# Patient Record
Sex: MALE | Race: WHITE | Employment: FULL TIME | URBAN - METROPOLITAN AREA
[De-identification: names, ages, dates, MRNs, and addresses within clinical notes are randomized per-mention and may not be internally consistent; named-entity substitution may affect disease eponyms.]

---

## 2021-06-24 ENCOUNTER — HOSPITAL ENCOUNTER (OUTPATIENT)
Dept: GENERAL RADIOLOGY | Age: 51
Discharge: HOME | End: 2021-06-24
Payer: COMMERCIAL

## 2021-06-24 DIAGNOSIS — R10.13 EPIGASTRIC PAIN: ICD-10-CM

## 2021-06-24 PROCEDURE — 74240 X-RAY XM UPR GI TRC 1CNTRST: CPT

## 2021-11-01 PROBLEM — K40.90 INGUINAL HERNIA OF LEFT SIDE WITHOUT OBSTRUCTION OR GANGRENE: Status: ACTIVE | Noted: 2021-11-01

## 2021-11-15 ENCOUNTER — APPOINTMENT (OUTPATIENT)
Dept: CT IMAGING | Age: 51
End: 2021-11-15
Payer: COMMERCIAL

## 2021-11-15 ENCOUNTER — HOSPITAL ENCOUNTER (EMERGENCY)
Age: 51
Discharge: HOME | End: 2021-11-16
Attending: EMERGENCY MEDICINE
Payer: COMMERCIAL

## 2021-11-15 DIAGNOSIS — K40.20 NON-RECURRENT BILATERAL INGUINAL HERNIA WITHOUT OBSTRUCTION OR GANGRENE: Primary | ICD-10-CM

## 2021-11-15 DIAGNOSIS — K40.90 INGUINAL HERNIA OF LEFT SIDE WITHOUT OBSTRUCTION OR GANGRENE: ICD-10-CM

## 2021-11-15 LAB
ANION GAP SERPL CALCULATED.3IONS-SCNC: 16 MMOL/L (ref 4–16)
BACTERIA: ABNORMAL /HPF
BASOPHILS ABSOLUTE: 0.1 K/CU MM
BASOPHILS RELATIVE PERCENT: 1.1 % (ref 0–1)
BILIRUBIN URINE: NEGATIVE MG/DL
BLOOD, URINE: NEGATIVE
BUN BLDV-MCNC: 12 MG/DL (ref 6–23)
CALCIUM SERPL-MCNC: 8.8 MG/DL (ref 8.3–10.6)
CAST TYPE: ABNORMAL /HPF
CHLORIDE BLD-SCNC: 91 MMOL/L (ref 99–110)
CLARITY: CLEAR
CO2: 20 MMOL/L (ref 21–32)
COLOR: ABNORMAL
COMMENT UA: ABNORMAL
CREAT SERPL-MCNC: 0.7 MG/DL (ref 0.9–1.3)
CRYSTAL TYPE: ABNORMAL /HPF
DIFFERENTIAL TYPE: ABNORMAL
EOSINOPHILS ABSOLUTE: 0 K/CU MM
EOSINOPHILS RELATIVE PERCENT: 0.5 % (ref 0–3)
EPITHELIAL CELLS, UA: 2 /HPF
GFR AFRICAN AMERICAN: >60 ML/MIN/1.73M2
GFR NON-AFRICAN AMERICAN: >60 ML/MIN/1.73M2
GLUCOSE BLD-MCNC: 121 MG/DL (ref 70–99)
GLUCOSE, URINE: NEGATIVE MG/DL
HCT VFR BLD CALC: 44.2 % (ref 42–52)
HEMOGLOBIN: 15.9 GM/DL (ref 13.5–18)
IMMATURE NEUTROPHIL %: 0.5 % (ref 0–0.43)
KETONES, URINE: NEGATIVE MG/DL
LEUKOCYTE ESTERASE, URINE: NEGATIVE
LYMPHOCYTES ABSOLUTE: 1.5 K/CU MM
LYMPHOCYTES RELATIVE PERCENT: 27.2 % (ref 24–44)
MCH RBC QN AUTO: 32.4 PG (ref 27–31)
MCHC RBC AUTO-ENTMCNC: 36 % (ref 32–36)
MCV RBC AUTO: 90.2 FL (ref 78–100)
MONOCYTES ABSOLUTE: 0.7 K/CU MM
MONOCYTES RELATIVE PERCENT: 12.7 % (ref 0–4)
NITRITE URINE, QUANTITATIVE: NEGATIVE
PDW BLD-RTO: 15.9 % (ref 11.7–14.9)
PH, URINE: 6 (ref 5–8)
PLATELET # BLD: 127 K/CU MM (ref 140–440)
PMV BLD AUTO: 13.1 FL (ref 7.5–11.1)
POTASSIUM SERPL-SCNC: 4 MMOL/L (ref 3.5–5.1)
PROTEIN UA: NEGATIVE MG/DL
RBC # BLD: 4.9 M/CU MM (ref 4.6–6.2)
RBC URINE: ABNORMAL /HPF (ref 0–3)
SEGMENTED NEUTROPHILS ABSOLUTE COUNT: 3.3 K/CU MM
SEGMENTED NEUTROPHILS RELATIVE PERCENT: 58 % (ref 36–66)
SODIUM BLD-SCNC: 127 MMOL/L (ref 135–145)
SPECIFIC GRAVITY UA: 1.01 (ref 1–1.03)
TOTAL IMMATURE NEUTOROPHIL: 0.03 K/CU MM
UROBILINOGEN, URINE: 0.2 MG/DL (ref 0.2–1)
WBC # BLD: 5.7 K/CU MM (ref 4–10.5)
WBC UA: ABNORMAL /HPF (ref 0–2)

## 2021-11-15 PROCEDURE — 80048 BASIC METABOLIC PNL TOTAL CA: CPT

## 2021-11-15 PROCEDURE — 74177 CT ABD & PELVIS W/CONTRAST: CPT

## 2021-11-15 PROCEDURE — 6360000002 HC RX W HCPCS: Performed by: EMERGENCY MEDICINE

## 2021-11-15 PROCEDURE — 81001 URINALYSIS AUTO W/SCOPE: CPT

## 2021-11-15 PROCEDURE — 96374 THER/PROPH/DIAG INJ IV PUSH: CPT

## 2021-11-15 PROCEDURE — 99285 EMERGENCY DEPT VISIT HI MDM: CPT

## 2021-11-15 PROCEDURE — 85025 COMPLETE CBC W/AUTO DIFF WBC: CPT

## 2021-11-15 PROCEDURE — 96375 TX/PRO/DX INJ NEW DRUG ADDON: CPT

## 2021-11-15 PROCEDURE — 6360000004 HC RX CONTRAST MEDICATION: Performed by: EMERGENCY MEDICINE

## 2021-11-15 RX ORDER — PROMETHAZINE HYDROCHLORIDE 25 MG/1
TABLET ORAL
COMMUNITY
Start: 2021-11-08

## 2021-11-15 RX ORDER — ONDANSETRON 2 MG/ML
4 INJECTION INTRAMUSCULAR; INTRAVENOUS ONCE
Status: COMPLETED | OUTPATIENT
Start: 2021-11-15 | End: 2021-11-15

## 2021-11-15 RX ORDER — MORPHINE SULFATE 4 MG/ML
4 INJECTION, SOLUTION INTRAMUSCULAR; INTRAVENOUS ONCE
Status: COMPLETED | OUTPATIENT
Start: 2021-11-15 | End: 2021-11-15

## 2021-11-15 RX ORDER — ONDANSETRON 4 MG/1
TABLET, FILM COATED ORAL
COMMUNITY
Start: 2021-11-08 | End: 2021-11-16

## 2021-11-15 RX ADMIN — MORPHINE SULFATE 4 MG: 4 INJECTION INTRAVENOUS at 23:04

## 2021-11-15 RX ADMIN — ONDANSETRON 4 MG: 2 INJECTION INTRAMUSCULAR; INTRAVENOUS at 23:03

## 2021-11-15 RX ADMIN — IOPAMIDOL 75 ML: 755 INJECTION, SOLUTION INTRAVENOUS at 23:52

## 2021-11-15 ASSESSMENT — PAIN DESCRIPTION - ORIENTATION: ORIENTATION: LEFT

## 2021-11-15 ASSESSMENT — PAIN DESCRIPTION - ONSET: ONSET: ON-GOING

## 2021-11-15 ASSESSMENT — PAIN DESCRIPTION - LOCATION: LOCATION: GROIN

## 2021-11-15 ASSESSMENT — PAIN SCALES - GENERAL
PAINLEVEL_OUTOF10: 5
PAINLEVEL_OUTOF10: 5

## 2021-11-15 ASSESSMENT — PAIN DESCRIPTION - DESCRIPTORS: DESCRIPTORS: CRAMPING;CONSTANT

## 2021-11-15 ASSESSMENT — PAIN DESCRIPTION - FREQUENCY: FREQUENCY: CONTINUOUS

## 2021-11-15 ASSESSMENT — PAIN DESCRIPTION - PROGRESSION: CLINICAL_PROGRESSION: NOT CHANGED

## 2021-11-15 ASSESSMENT — PAIN DESCRIPTION - PAIN TYPE: TYPE: ACUTE PAIN

## 2021-11-15 ASSESSMENT — PAIN - FUNCTIONAL ASSESSMENT: PAIN_FUNCTIONAL_ASSESSMENT: PREVENTS OR INTERFERES SOME ACTIVE ACTIVITIES AND ADLS

## 2021-11-15 NOTE — Clinical Note
Jessica Diaz was seen and treated in our emergency department on 11/15/2021. He may return to work on 11/19/2021. If you have any questions or concerns, please don't hesitate to call.       Tracey Frazier MD

## 2021-11-16 VITALS
SYSTOLIC BLOOD PRESSURE: 110 MMHG | RESPIRATION RATE: 18 BRPM | OXYGEN SATURATION: 96 % | TEMPERATURE: 98.4 F | WEIGHT: 225 LBS | DIASTOLIC BLOOD PRESSURE: 76 MMHG | HEIGHT: 72 IN | BODY MASS INDEX: 30.48 KG/M2 | HEART RATE: 79 BPM

## 2021-11-16 RX ORDER — ONDANSETRON 4 MG/1
4 TABLET, FILM COATED ORAL EVERY 8 HOURS PRN
Qty: 8 TABLET | Refills: 0 | Status: SHIPPED | OUTPATIENT
Start: 2021-11-16

## 2021-11-16 RX ORDER — HYDROCODONE BITARTRATE AND ACETAMINOPHEN 5; 325 MG/1; MG/1
1 TABLET ORAL EVERY 6 HOURS PRN
Qty: 10 TABLET | Refills: 0 | Status: SHIPPED | OUTPATIENT
Start: 2021-11-16 | End: 2021-11-19

## 2021-11-16 NOTE — ED NOTES
Patient prepared for and ready to be discharged. Patient discharged at this time in no acute distress after verbalizing understanding of discharge instructions. Patient left after receiving After Visit Summary instructions.       Lucien Forrest RN  11/16/21 0022

## 2021-11-16 NOTE — ED PROVIDER NOTES
EMERGENCY DEPARTMENT ENCOUNTER    Patient: Omaira Doss  MRN: [id number]  : 1970  Date of Evaluation: 2021  ED Provider:  Randa Fleischer, MD    CHIEF COMPLAINT  Chief Complaint   Patient presents with    Inguinal Hernia     Patient states right side able to reduce, Pateitn to have hernia surgery on thursday     Nausea       HPI  Omaira Doss is a 54 y.o. male who presents with complaints of moderate to severe, constant sharp left lower quadrant abdominal pain which had begun over the last several weeks intermittently but has become more constant over the last week. Worsened with movement and palpation. Radiates to the left groin. Denies any testicular pain. Has had associated nausea without emesis. Patient reports that he had previously felt a slight bulge in the left groin and saw his primary care provider and was referred to general surgeon for presumed left inguinal hernia. He states he is scheduled for surgery for this in 3 days. He does report that the hernia easily reduces however. Denies any other associated symptoms or complaints or concerns.     REVIEW OF SYSTEMS    I have reviewed the nursing notes    Constitutional: negative for fever, chills, weight change, change in appetite  Neurological: negative for HA, lightheadedness, numbness, weakness  Ophthalmic: negative for vision change  ENT: negative for congestion, runny nose, sore throat  Cardiovascular: negative for chest pain, palpitations  Respiratory: negative for SOB, cough  GI: negative for vomiting, diarrhea, constipation, hematemesis, hematochezia, melena   : negative for dysuria, hematuria, testicular pain or swelling  Musculoskeletal: negative for myalgias, decreased ROM, joint swelling  Dermatological: negative for rash, pruritis  Endocrine: negative for temperature intolerance, diaphoresis  Hematological: negative for anemia, bleeding      PAST MEDICAL HISTORY  Past Medical History:   Diagnosis Date    Anxiety  Hernia, inguinal, left        CURRENT MEDICATIONS  [unfilled]    ALLERGIES  No Known Allergies    SURGICAL HISTORY  Past Surgical History:   Procedure Laterality Date    CYST REMOVAL Left     LEFT ARM       FAMILY HISTORY  No family history on file. SOCIAL HISTORY  Social History     Socioeconomic History    Marital status:      Spouse name: Not on file    Number of children: Not on file    Years of education: Not on file    Highest education level: Not on file   Occupational History    Not on file   Tobacco Use    Smoking status: Former Smoker     Quit date: 2006     Years since quitting: 15.8    Smokeless tobacco: Never Used   Substance and Sexual Activity    Alcohol use: Yes     Comment: few beers a week     Drug use: Never    Sexual activity: Yes     Partners: Female     Comment:    Other Topics Concern    Not on file   Social History Narrative    Not on file     Social Determinants of Health     Financial Resource Strain:     Difficulty of Paying Living Expenses: Not on file   Food Insecurity:     Worried About [address] in the Last Year: Not on file    [address] in the Last Year: Not on file   Transportation Needs:     Lack of Transportation (Medical): Not on file    Lack of Transportation (Non-Medical):  Not on file   Physical Activity:     Days of Exercise per Week: Not on file    Minutes of Exercise per Session: Not on file   Stress:     Feeling of Stress : Not on file   Social Connections:     Frequency of Communication with Friends and Family: Not on file    Frequency of Social Gatherings with Friends and Family: Not on file    Attends Z[employer]ast[employer] Services: Not on file    Active Member of Clubs or Organizations: Not on file    Attends Club or Organization Meetings: Not on file    Marital Status: Not on file   Intimate Partner Violence:     Fear of Current or Ex-Partner: Not on file    Emotionally Abused: Not on file    Physically Abused: Not on file    Sexually Abused: Not on file   Housing Stability:     Unable to Pay for Housing in the Last Year: Not on file    Number of Places Lived in the Last Year: Not on file    Unstable Housing in the Last Year: Not on file         **Past medical, family and social histories reviewed and verified by me**      PHYSICAL EXAM  VITAL SIGNS:   ED Triage Vitals [11/15/21 2116]   Enc Vitals Group      /76      Pulse 101      Resp 22      Temp 98.6 °F (37 °C)      Temp Source Oral      SpO2 96 %      Weight 225 lb (102.1 kg)      Height 6' (1.829 m)      Head Circumference       Peak Flow       Pain Score       Pain Loc       Pain Edu? Excl. in [address]? Vitals during ED course were reviewed and are as charted. Constitutional: Minimal distress, Non-toxic appearance    Eyes:  Conjunctiva normal, No discharge    HENT: Normocephalic, Atraumatic, Bilateral external ears normal, posterior oropharynx is nonerythematous and without exudate, uvula is midline, oropharynx moist    Neck: Supple, no stridor, no grossly visible or palpable masses    Cardiovascular: Regular rate and rhythm, No murmurs, No rubs, No gallops    Pulmonary/Chest:  Normal breath sounds, No respiratory distress or accessory muscle use, No wheezing, crackles or rhonchi. Abdomen: Left lower quadrant abdominal tenderness to palpation without peritoneal signs, otherwise abdomen is soft, nondistended and nonrigid, No tenderness or peritoneal signs elsewhere, No masses, normal bowel sounds    Genitalia: External genitalia appear normal, No masses or lesions. No discharge. No scrotal swelling or discoloration. No testicular pain. Normal lie of the testicles bilaterally. Normal cremasteric reflexes. Back:  No midline point tenderness, No paraspinous muscle tenderness.   No CVA tenderness    Extremities:  No gross deformities, no edema, no tenderness    Neurologic:  Normal motor function, Normal sensory function, No focal deficits    Skin:  Warm, Dry, No erythema, No rash, No cyanosis, No mottling    Lymphatic:  No inguinal lymphadenopathy          RADIOLOGY/PROCEDURES/LABS/MEDICATIONS ADMINISTERED:    I have reviewed and interpreted all of the currently available lab results from this visit (if applicable):  Results for orders placed or performed during the hospital encounter of 11/15/21   CBC w/ auto diff   Result Value Ref Range    WBC 5.7 4.0 - 10.5 K/CU MM    RBC 4.90 4.6 - 6.2 M/CU MM    Hemoglobin 15.9 13.5 - 18.0 GM/DL    Hematocrit 44.2 42 - 52 %    MCV 90.2 78 - 100 FL    MCH 32.4 (H) 27 - 31 PG    MCHC 36.0 32.0 - 36.0 %    RDW 15.9 (H) 11.7 - 14.9 %    Platelets 025 (L) 400 - 440 K/CU MM    MPV 13.1 (H) 7.5 - 11.1 FL    Differential Type AUTOMATED DIFFERENTIAL     Segs Relative 58.0 36 - 66 %    Lymphocytes % 27.2 24 - 44 %    Monocytes % 12.7 (H) 0 - 4 %    Eosinophils % 0.5 0 - 3 %    Basophils % 1.1 (H) 0 - 1 %    Segs Absolute 3.3 K/CU MM    Lymphocytes Absolute 1.5 K/CU MM    Monocytes Absolute 0.7 K/CU MM    Eosinophils Absolute 0.0 K/CU MM    Basophils Absolute 0.1 K/CU MM    Immature Neutrophil % 0.5 (H) 0 - 0.43 %    Total Immature Neutrophil 0.03 K/CU MM   Basic Metabolic Panel w/ Reflex to MG   Result Value Ref Range    Sodium 127 (L) 135 - 145 MMOL/L    Potassium 4.0 3.5 - 5.1 MMOL/L    Chloride 91 (L) 99 - 110 mMol/L    CO2 20 (L) 21 - 32 MMOL/L    Anion Gap 16 4 - 16    BUN 12 6 - 23 MG/DL    CREATININE 0.7 (L) 0.9 - 1.3 MG/DL    Glucose 121 (H) 70 - 99 MG/DL    Calcium 8.8 8.3 - 10.6 MG/DL    GFR Non-African American >60 >60 mL/min/1.73m2    GFR African American >60 >60 mL/min/1.73m2   Urinalysis with microscopic   Result Value Ref Range    Color, UA DARK YELLOW (A) YELLOW    Clarity, UA CLEAR CLEAR    Glucose, Urine NEGATIVE NEGATIVE MG/DL    Bilirubin Urine NEGATIVE NEGATIVE MG/DL    Ketones, Urine NEGATIVE NEGATIVE MG/DL    Specific Gravity, UA 1.015 1.001 - 1.035    Blood, Urine NEGATIVE NEGATIVE    pH, Urine 6.0 5.0 - 8.0    Protein, UA NEGATIVE NEGATIVE MG/DL    Urobilinogen, Urine 0.2 0.2 - 1.0 MG/DL    Nitrite Urine, Quantitative NEGATIVE NEGATIVE    Leukocyte Esterase, Urine NEGATIVE NEGATIVE    RBC, UA NO CELLS SEEN 0 - 3 /HPF    WBC, UA NO CELLS SEEN 0 - 2 /HPF    Epithelial Cells, UA 2 /HPF    Cast Type NO CAST FORMS SEEN NO CAST FORMS SEEN /HPF    Bacteria, UA RARE (A) NEGATIVE /HPF    Crystal Type RARE (A) NEGATIVE /HPF    Urinalysis Comments RARE           ABNORMAL LABS:  Labs Reviewed   CBC WITH AUTO DIFFERENTIAL - Abnormal; Notable for the following components:       Result Value    MCH 32.4 (*)     RDW 15.9 (*)     Platelets 175 (*)     MPV 13.1 (*)     Monocytes % 12.7 (*)     Basophils % 1.1 (*)     Immature Neutrophil % 0.5 (*)     All other components within normal limits   BASIC METABOLIC PANEL W/ REFLEX TO MG FOR LOW K - Abnormal; Notable for the following components:    Sodium 127 (*)     Chloride 91 (*)     CO2 20 (*)     CREATININE 0.7 (*)     Glucose 121 (*)     All other components within normal limits   URINALYSIS WITH MICROSCOPIC - Abnormal; Notable for the following components:    Color, UA DARK YELLOW (*)     Bacteria, UA RARE (*)     Crystal Type RARE (*)     All other components within normal limits         IMAGING STUDIES ORDERED:  CT ABDOMEN PELVIS W IV CONTRAST    I have personally viewed the imaging studies. The radiologist interpretation is:   CT ABDOMEN PELVIS W IV CONTRAST Additional Contrast? None   Final Result   Bilateral fat containing inguinal hernias, larger on the left. Nonobstructing renal stones bilaterally. Diffuse hepatic steatosis.                MEDICATIONS ADMINISTERED:  Medications   morphine sulfate (PF) injection 4 mg (4 mg IntraVENous Given 11/15/21 2304)   ondansetron (ZOFRAN) injection 4 mg (4 mg IntraVENous Given 11/15/21 2303)   iopamidol (ISOVUE-370) 76 % injection 75 mL (75 mLs IntraVENous Given 11/15/21 8154)         COURSE & MEDICAL DECISION MAKING  Last vitals: /74   Pulse 97   Temp 98.4 °F (36.9 °C)   Resp 19   Ht 6' (1.829 m)   Wt 225 lb (102.1 kg)   SpO2 99%   BMI 30.52 kg/m²     Patient presented with painful fat-containing left inguinal hernia. Differential diagnoses considered included, but were not limited to, acute appendicitis, acute intra-abdominal catastrophe, acute vascular emergency, bowel obstruction, perforated bowel, incarcerated or strangulated hernia, colitis, diverticulitis, ischemic bowel, cystitis/UTI/pyelonephritis, kidney stone and acute testicular torsion/pathology. Patient was given the above medications with improvement in symptoms. On repeat examination the abdomen was non-surgical without peritoneal signs. The patient was able to tolerate oral intake. Patient was advised of the changing nature of intra-abdominal pathology and specific signs and symptoms to watch which may signal serious infectious, metabolic or surgical conditions for which immediate return to the ED would be necessary for further diagnosis and treatment. Additional workup and treatment in the ED as documented above. Patient reassured and will be discharged home. I have explained to the patient in appropriate terminology our work-up in the ED and their diagnosis. I have also given anticipatory guidance and expectant management of their condition as an outpatient as per my custom. The patient was given clear discharge and follow-up instructions including return to the ER immediately for worsening concerns. The patient has been advised to follow-up with their primary care physician and/or referred physician in the next two to three days or sooner if worsening and to return to the ER immediately as above with any concerns. I provided the patient counseling with regard to my customary list of strict return precautions as well as return precautions specific to the cause for today's emergency department visit.  The patient will return

## 2021-11-16 NOTE — ED TRIAGE NOTES
Patient comes to ED with complaint of hernia on left side groin that he states he is able to reduce his self, patient also states that he has been very fatigued and has nausea and has been dry heaving. Patient states that he is suppose to have hernia repair surgery this Thursday.

## 2021-12-01 PROBLEM — Z98.890 S/P LEFT INGUINAL HERNIA REPAIR: Status: ACTIVE | Noted: 2021-12-01

## 2021-12-01 PROBLEM — I83.892 VARICOSE VEINS OF LEFT LOWER EXTREMITY WITH OTHER COMPLICATIONS: Status: ACTIVE | Noted: 2021-12-01

## 2021-12-01 PROBLEM — Z87.19 S/P LEFT INGUINAL HERNIA REPAIR: Status: ACTIVE | Noted: 2021-12-01

## 2022-01-04 PROBLEM — I83.893 VARICOSE VEINS OF LOWER EXTREMITIES WITH COMPLICATIONS, BILATERAL: Status: ACTIVE | Noted: 2022-01-04

## 2023-05-09 LAB
ALBUMIN SERPL-MCNC: 3.9 G/DL
ALP BLD-CCNC: 114 U/L
ALT SERPL-CCNC: 38 U/L
ANION GAP SERPL CALCULATED.3IONS-SCNC: 1.2 MMOL/L
AST SERPL-CCNC: 46 U/L
BASOPHILS ABSOLUTE: 0.1 /ΜL
BASOPHILS RELATIVE PERCENT: 1.6 %
BILIRUB SERPL-MCNC: 0.7 MG/DL (ref 0.1–1.4)
BUN BLDV-MCNC: 16 MG/DL
CALCIUM SERPL-MCNC: 9.8 MG/DL
CHLORIDE BLD-SCNC: 101 MMOL/L
CO2: 23 MMOL/L
CREAT SERPL-MCNC: 0.9 MG/DL
EGFR: NORMAL
EOSINOPHILS ABSOLUTE: 0.2 /ΜL
EOSINOPHILS RELATIVE PERCENT: 3.3 %
GLUCOSE BLD-MCNC: 102 MG/DL
HCT VFR BLD CALC: 41.1 % (ref 41–53)
HEMOGLOBIN: 14.1 G/DL (ref 13.5–17.5)
LYMPHOCYTES ABSOLUTE: 1.8 /ΜL
LYMPHOCYTES RELATIVE PERCENT: 37.3 %
MCH RBC QN AUTO: 29.3 PG
MCHC RBC AUTO-ENTMCNC: 34.3 G/DL
MCV RBC AUTO: 85.3 FL
MONOCYTES ABSOLUTE: 0.8 /ΜL
MONOCYTES RELATIVE PERCENT: 15.4 %
NEUTROPHILS ABSOLUTE: 2.1 /ΜL
NEUTROPHILS RELATIVE PERCENT: 42.2 %
PDW BLD-RTO: 16.3 %
PLATELET # BLD: 130 K/ΜL
PMV BLD AUTO: 10.7 FL
POTASSIUM SERPL-SCNC: 4 MMOL/L
RBC # BLD: 4.82 10^6/ΜL
SODIUM BLD-SCNC: 133 MMOL/L
TOTAL PROTEIN: 7.2
WBC # BLD: 4.9 10^3/ML

## 2023-12-12 ENCOUNTER — OFFICE VISIT (OUTPATIENT)
Dept: FAMILY MEDICINE CLINIC | Age: 53
End: 2023-12-12

## 2023-12-12 VITALS
WEIGHT: 259 LBS | BODY MASS INDEX: 35.08 KG/M2 | HEART RATE: 61 BPM | SYSTOLIC BLOOD PRESSURE: 114 MMHG | DIASTOLIC BLOOD PRESSURE: 74 MMHG | HEIGHT: 72 IN | OXYGEN SATURATION: 96 %

## 2023-12-12 DIAGNOSIS — M25.551 HIP PAIN, RIGHT: ICD-10-CM

## 2023-12-12 DIAGNOSIS — M54.50 CHRONIC BILATERAL LOW BACK PAIN WITHOUT SCIATICA: ICD-10-CM

## 2023-12-12 DIAGNOSIS — K76.6 PORTAL HYPERTENSION (HCC): Primary | ICD-10-CM

## 2023-12-12 DIAGNOSIS — G89.29 CHRONIC BILATERAL LOW BACK PAIN WITHOUT SCIATICA: ICD-10-CM

## 2023-12-12 DIAGNOSIS — I85.00 IDIOPATHIC ESOPHAGEAL VARICES WITHOUT BLEEDING (HCC): ICD-10-CM

## 2023-12-12 DIAGNOSIS — F34.1 DYSTHYMIA: ICD-10-CM

## 2023-12-12 PROCEDURE — 99213 OFFICE O/P EST LOW 20 MIN: CPT | Performed by: INTERNAL MEDICINE

## 2023-12-12 RX ORDER — NADOLOL 20 MG/1
20 TABLET ORAL DAILY
COMMUNITY
Start: 2023-12-06 | End: 2023-12-12 | Stop reason: SDUPTHER

## 2023-12-12 RX ORDER — ACAMPROSATE CALCIUM 333 MG/1
TABLET, DELAYED RELEASE ORAL
COMMUNITY
Start: 2023-12-06 | End: 2023-12-12 | Stop reason: SDUPTHER

## 2023-12-12 RX ORDER — NADOLOL 20 MG/1
20 TABLET ORAL DAILY
Qty: 90 TABLET | Refills: 1 | Status: SHIPPED | OUTPATIENT
Start: 2023-12-12

## 2023-12-12 RX ORDER — PAROXETINE HYDROCHLORIDE 20 MG/1
30 TABLET, FILM COATED ORAL EVERY MORNING
Qty: 90 TABLET | Refills: 1 | Status: SHIPPED | OUTPATIENT
Start: 2023-12-12

## 2023-12-12 RX ORDER — ACAMPROSATE CALCIUM 333 MG/1
TABLET, DELAYED RELEASE ORAL
Qty: 270 TABLET | Refills: 1 | Status: SHIPPED | OUTPATIENT
Start: 2023-12-12

## 2023-12-12 ASSESSMENT — PATIENT HEALTH QUESTIONNAIRE - PHQ9
SUM OF ALL RESPONSES TO PHQ9 QUESTIONS 1 & 2: 0
SUM OF ALL RESPONSES TO PHQ QUESTIONS 1-9: 0
2. FEELING DOWN, DEPRESSED OR HOPELESS: 0
SUM OF ALL RESPONSES TO PHQ QUESTIONS 1-9: 0
1. LITTLE INTEREST OR PLEASURE IN DOING THINGS: 0

## 2024-03-11 ENCOUNTER — APPOINTMENT (OUTPATIENT)
Dept: NON INVASIVE DIAGNOSTICS | Age: 54
End: 2024-03-11
Attending: INTERNAL MEDICINE

## 2024-03-11 ENCOUNTER — APPOINTMENT (OUTPATIENT)
Dept: GENERAL RADIOLOGY | Age: 54
End: 2024-03-11

## 2024-03-11 ENCOUNTER — APPOINTMENT (OUTPATIENT)
Dept: CT IMAGING | Age: 54
End: 2024-03-11

## 2024-03-11 ENCOUNTER — HOSPITAL ENCOUNTER (INPATIENT)
Age: 54
LOS: 4 days | Discharge: HOME | End: 2024-03-15
Attending: EMERGENCY MEDICINE

## 2024-03-11 DIAGNOSIS — K70.10 ALCOHOLIC HEPATITIS WITHOUT ASCITES: ICD-10-CM

## 2024-03-11 DIAGNOSIS — K70.30 ALCOHOLIC CIRRHOSIS OF LIVER WITHOUT ASCITES (HCC): ICD-10-CM

## 2024-03-11 DIAGNOSIS — R06.02 SHORTNESS OF BREATH: ICD-10-CM

## 2024-03-11 DIAGNOSIS — D69.6 THROMBOCYTOPENIA (HCC): ICD-10-CM

## 2024-03-11 DIAGNOSIS — R07.9 CHEST PAIN, RULE OUT ACUTE MYOCARDIAL INFARCTION: ICD-10-CM

## 2024-03-11 DIAGNOSIS — R07.9 CHEST PAIN, UNSPECIFIED TYPE: Primary | ICD-10-CM

## 2024-03-11 LAB
ALBUMIN SERPL-MCNC: 3.2 GM/DL (ref 3.4–5)
ALP BLD-CCNC: 224 IU/L (ref 40–128)
ALT SERPL-CCNC: 218 U/L (ref 10–40)
ANION GAP SERPL CALCULATED.3IONS-SCNC: 13 MMOL/L (ref 7–16)
AST SERPL-CCNC: 376 IU/L (ref 15–37)
BASOPHILS ABSOLUTE: 0.1 K/CU MM
BASOPHILS RELATIVE PERCENT: 1.1 % (ref 0–1)
BILIRUB SERPL-MCNC: 2.9 MG/DL (ref 0–1)
BUN SERPL-MCNC: 11 MG/DL (ref 6–23)
CALCIUM SERPL-MCNC: 7.8 MG/DL (ref 8.3–10.6)
CHLORIDE BLD-SCNC: 97 MMOL/L (ref 99–110)
CO2: 26 MMOL/L (ref 21–32)
CREAT SERPL-MCNC: 0.6 MG/DL (ref 0.9–1.3)
D DIMER: 0.99 UG/ML (FEU)
DIFFERENTIAL TYPE: ABNORMAL
ECHO AO ROOT DIAM: 3.1 CM
ECHO AO ROOT INDEX: 1.32 CM/M2
ECHO AV AREA PEAK VELOCITY: 3.1 CM2
ECHO AV AREA VTI: 2.8 CM2
ECHO AV AREA/BSA PEAK VELOCITY: 1.3 CM2/M2
ECHO AV AREA/BSA VTI: 1.2 CM2/M2
ECHO AV MEAN GRADIENT: 4 MMHG
ECHO AV MEAN VELOCITY: 0.9 M/S
ECHO AV PEAK GRADIENT: 7 MMHG
ECHO AV PEAK VELOCITY: 1.3 M/S
ECHO AV VELOCITY RATIO: 0.92
ECHO AV VTI: 29.4 CM
ECHO BSA: 2.4 M2
ECHO EST RA PRESSURE: 3 MMHG
ECHO LA AREA 4C: 17.7 CM2
ECHO LA DIAMETER INDEX: 1.32 CM/M2
ECHO LA DIAMETER: 3.1 CM
ECHO LA MAJOR AXIS: 4.8 CM
ECHO LA TO AORTIC ROOT RATIO: 1
ECHO LA VOL MOD A4C: 53 ML (ref 18–58)
ECHO LA VOLUME INDEX MOD A4C: 23 ML/M2 (ref 16–34)
ECHO LV E' LATERAL VELOCITY: 12 CM/S
ECHO LV E' SEPTAL VELOCITY: 9 CM/S
ECHO LV EDV A4C: 134 ML
ECHO LV EDV INDEX A4C: 57 ML/M2
ECHO LV EJECTION FRACTION A4C: 51 %
ECHO LV ESV A4C: 66 ML
ECHO LV ESV INDEX A4C: 28 ML/M2
ECHO LV FRACTIONAL SHORTENING: 30 % (ref 28–44)
ECHO LV INTERNAL DIMENSION DIASTOLE INDEX: 1.88 CM/M2
ECHO LV INTERNAL DIMENSION DIASTOLIC: 4.4 CM (ref 4.2–5.9)
ECHO LV INTERNAL DIMENSION SYSTOLIC INDEX: 1.32 CM/M2
ECHO LV INTERNAL DIMENSION SYSTOLIC: 3.1 CM
ECHO LV IVSD: 1.2 CM (ref 0.6–1)
ECHO LV MASS 2D: 191.3 G (ref 88–224)
ECHO LV MASS INDEX 2D: 81.8 G/M2 (ref 49–115)
ECHO LV POSTERIOR WALL DIASTOLIC: 1.2 CM (ref 0.6–1)
ECHO LV RELATIVE WALL THICKNESS RATIO: 0.55
ECHO LVOT AREA: 3.5 CM2
ECHO LVOT AV VTI INDEX: 0.81
ECHO LVOT DIAM: 2.1 CM
ECHO LVOT MEAN GRADIENT: 2 MMHG
ECHO LVOT PEAK GRADIENT: 5 MMHG
ECHO LVOT PEAK VELOCITY: 1.2 M/S
ECHO LVOT STROKE VOLUME INDEX: 35.4 ML/M2
ECHO LVOT SV: 82.7 ML
ECHO LVOT VTI: 23.9 CM
ECHO MV A VELOCITY: 0.65 M/S
ECHO MV E DECELERATION TIME (DT): 187 MS
ECHO MV E VELOCITY: 0.81 M/S
ECHO MV E/A RATIO: 1.25
ECHO MV E/E' LATERAL: 6.75
ECHO MV E/E' RATIO (AVERAGED): 7.88
ECHO RIGHT VENTRICULAR SYSTOLIC PRESSURE (RVSP): 13 MMHG
ECHO RV MID DIMENSION: 2.7 CM
ECHO TV REGURGITANT MAX VELOCITY: 1.58 M/S
ECHO TV REGURGITANT PEAK GRADIENT: 10 MMHG
EOSINOPHILS ABSOLUTE: 0.1 K/CU MM
EOSINOPHILS RELATIVE PERCENT: 1.4 % (ref 0–3)
GFR SERPL CREATININE-BSD FRML MDRD: >60 ML/MIN/1.73M2
GLUCOSE SERPL-MCNC: 171 MG/DL (ref 70–99)
HCT VFR BLD CALC: 48 % (ref 42–52)
HEMOGLOBIN: 17.2 GM/DL (ref 13.5–18)
IMMATURE NEUTROPHIL %: 0.4 % (ref 0–0.43)
LIPASE: 73 IU/L (ref 13–60)
LYMPHOCYTES ABSOLUTE: 2.3 K/CU MM
LYMPHOCYTES RELATIVE PERCENT: 40.2 % (ref 24–44)
MCH RBC QN AUTO: 29.2 PG (ref 27–31)
MCHC RBC AUTO-ENTMCNC: 35.8 % (ref 32–36)
MCV RBC AUTO: 81.4 FL (ref 78–100)
MONOCYTES ABSOLUTE: 0.7 K/CU MM
MONOCYTES RELATIVE PERCENT: 11.4 % (ref 0–4)
NUCLEATED RBC %: 0 %
PDW BLD-RTO: 16.6 % (ref 11.7–14.9)
PLATELET # BLD: 74 K/CU MM (ref 140–440)
PMV BLD AUTO: 11.3 FL (ref 7.5–11.1)
POTASSIUM SERPL-SCNC: 3.4 MMOL/L (ref 3.5–5.1)
RBC # BLD: 5.9 M/CU MM (ref 4.6–6.2)
REASON FOR REJECTION: NORMAL
REJECTED TEST: NORMAL
SEGMENTED NEUTROPHILS ABSOLUTE COUNT: 2.6 K/CU MM
SEGMENTED NEUTROPHILS RELATIVE PERCENT: 45.5 % (ref 36–66)
SODIUM BLD-SCNC: 136 MMOL/L (ref 135–145)
TOTAL IMMATURE NEUTOROPHIL: 0.02 K/CU MM
TOTAL NUCLEATED RBC: 0 K/CU MM
TOTAL PROTEIN: 6.1 GM/DL (ref 6.4–8.2)
TROPONIN, HIGH SENSITIVITY: 16 NG/L (ref 0–22)
TROPONIN, HIGH SENSITIVITY: 16 NG/L (ref 0–22)
WBC # BLD: 5.7 K/CU MM (ref 4–10.5)

## 2024-03-11 PROCEDURE — 6370000000 HC RX 637 (ALT 250 FOR IP): Performed by: INTERNAL MEDICINE

## 2024-03-11 PROCEDURE — 83690 ASSAY OF LIPASE: CPT

## 2024-03-11 PROCEDURE — 6360000004 HC RX CONTRAST MEDICATION: Performed by: EMERGENCY MEDICINE

## 2024-03-11 PROCEDURE — 80053 COMPREHEN METABOLIC PANEL: CPT

## 2024-03-11 PROCEDURE — 6360000002 HC RX W HCPCS: Performed by: INTERNAL MEDICINE

## 2024-03-11 PROCEDURE — 85025 COMPLETE CBC W/AUTO DIFF WBC: CPT

## 2024-03-11 PROCEDURE — 36415 COLL VENOUS BLD VENIPUNCTURE: CPT

## 2024-03-11 PROCEDURE — 94761 N-INVAS EAR/PLS OXIMETRY MLT: CPT

## 2024-03-11 PROCEDURE — 93306 TTE W/DOPPLER COMPLETE: CPT

## 2024-03-11 PROCEDURE — 71275 CT ANGIOGRAPHY CHEST: CPT

## 2024-03-11 PROCEDURE — 93005 ELECTROCARDIOGRAM TRACING: CPT | Performed by: EMERGENCY MEDICINE

## 2024-03-11 PROCEDURE — 93306 TTE W/DOPPLER COMPLETE: CPT | Performed by: INTERNAL MEDICINE

## 2024-03-11 PROCEDURE — 84484 ASSAY OF TROPONIN QUANT: CPT

## 2024-03-11 PROCEDURE — 87081 CULTURE SCREEN ONLY: CPT

## 2024-03-11 PROCEDURE — 1200000000 HC SEMI PRIVATE

## 2024-03-11 PROCEDURE — 99285 EMERGENCY DEPT VISIT HI MDM: CPT

## 2024-03-11 PROCEDURE — 71045 X-RAY EXAM CHEST 1 VIEW: CPT

## 2024-03-11 PROCEDURE — 2700000000 HC OXYGEN THERAPY PER DAY

## 2024-03-11 PROCEDURE — 85379 FIBRIN DEGRADATION QUANT: CPT

## 2024-03-11 PROCEDURE — 86140 C-REACTIVE PROTEIN: CPT

## 2024-03-11 PROCEDURE — 99254 IP/OBS CNSLTJ NEW/EST MOD 60: CPT | Performed by: INTERNAL MEDICINE

## 2024-03-11 PROCEDURE — 87430 STREP A AG IA: CPT

## 2024-03-11 PROCEDURE — 2580000003 HC RX 258: Performed by: INTERNAL MEDICINE

## 2024-03-11 RX ORDER — ACETAMINOPHEN 650 MG/1
650 SUPPOSITORY RECTAL EVERY 6 HOURS PRN
Status: DISCONTINUED | OUTPATIENT
Start: 2024-03-11 | End: 2024-03-15 | Stop reason: HOSPADM

## 2024-03-11 RX ORDER — ASPIRIN 81 MG/1
81 TABLET, CHEWABLE ORAL DAILY
Status: DISCONTINUED | OUTPATIENT
Start: 2024-03-12 | End: 2024-03-15 | Stop reason: HOSPADM

## 2024-03-11 RX ORDER — LANOLIN ALCOHOL/MO/W.PET/CERES
100 CREAM (GRAM) TOPICAL DAILY
Status: DISCONTINUED | OUTPATIENT
Start: 2024-03-14 | End: 2024-03-15 | Stop reason: HOSPADM

## 2024-03-11 RX ORDER — SODIUM CHLORIDE 0.9 % (FLUSH) 0.9 %
5-40 SYRINGE (ML) INJECTION PRN
Status: DISCONTINUED | OUTPATIENT
Start: 2024-03-11 | End: 2024-03-15 | Stop reason: HOSPADM

## 2024-03-11 RX ORDER — MORPHINE SULFATE 4 MG/ML
4 INJECTION, SOLUTION INTRAMUSCULAR; INTRAVENOUS
Status: DISCONTINUED | OUTPATIENT
Start: 2024-03-11 | End: 2024-03-15 | Stop reason: HOSPADM

## 2024-03-11 RX ORDER — PANTOPRAZOLE SODIUM 40 MG/1
40 TABLET, DELAYED RELEASE ORAL
Status: DISCONTINUED | OUTPATIENT
Start: 2024-03-12 | End: 2024-03-15 | Stop reason: HOSPADM

## 2024-03-11 RX ORDER — MAGNESIUM SULFATE IN WATER 40 MG/ML
2000 INJECTION, SOLUTION INTRAVENOUS PRN
Status: DISCONTINUED | OUTPATIENT
Start: 2024-03-11 | End: 2024-03-15 | Stop reason: HOSPADM

## 2024-03-11 RX ORDER — MORPHINE SULFATE 2 MG/ML
2 INJECTION, SOLUTION INTRAMUSCULAR; INTRAVENOUS
Status: DISCONTINUED | OUTPATIENT
Start: 2024-03-11 | End: 2024-03-15 | Stop reason: HOSPADM

## 2024-03-11 RX ORDER — ONDANSETRON 4 MG/1
4 TABLET, ORALLY DISINTEGRATING ORAL EVERY 8 HOURS PRN
Status: DISCONTINUED | OUTPATIENT
Start: 2024-03-11 | End: 2024-03-15 | Stop reason: HOSPADM

## 2024-03-11 RX ORDER — OMEGA-3 FATTY ACIDS/FISH OIL 300-1000MG
1 CAPSULE ORAL 2 TIMES DAILY
COMMUNITY

## 2024-03-11 RX ORDER — LORAZEPAM 1 MG/1
2 TABLET ORAL
Status: DISCONTINUED | OUTPATIENT
Start: 2024-03-11 | End: 2024-03-15 | Stop reason: HOSPADM

## 2024-03-11 RX ORDER — LORAZEPAM 0.5 MG/1
0.5 TABLET ORAL EVERY 4 HOURS PRN
Status: DISCONTINUED | OUTPATIENT
Start: 2024-03-11 | End: 2024-03-11 | Stop reason: SDUPTHER

## 2024-03-11 RX ORDER — ONDANSETRON 2 MG/ML
4 INJECTION INTRAMUSCULAR; INTRAVENOUS ONCE
Status: COMPLETED | OUTPATIENT
Start: 2024-03-11 | End: 2024-03-11

## 2024-03-11 RX ORDER — NAPROXEN 250 MG/1
500 TABLET ORAL 2 TIMES DAILY WITH MEALS
Status: DISCONTINUED | OUTPATIENT
Start: 2024-03-11 | End: 2024-03-15 | Stop reason: HOSPADM

## 2024-03-11 RX ORDER — SODIUM CHLORIDE 9 MG/ML
INJECTION, SOLUTION INTRAVENOUS CONTINUOUS
Status: DISPENSED | OUTPATIENT
Start: 2024-03-11 | End: 2024-03-12

## 2024-03-11 RX ORDER — POTASSIUM CHLORIDE 20 MEQ/1
40 TABLET, EXTENDED RELEASE ORAL PRN
Status: DISCONTINUED | OUTPATIENT
Start: 2024-03-11 | End: 2024-03-15 | Stop reason: HOSPADM

## 2024-03-11 RX ORDER — ACETAMINOPHEN 325 MG/1
650 TABLET ORAL EVERY 6 HOURS PRN
Status: DISCONTINUED | OUTPATIENT
Start: 2024-03-11 | End: 2024-03-15 | Stop reason: HOSPADM

## 2024-03-11 RX ORDER — LORAZEPAM 1 MG/1
1 TABLET ORAL
Status: DISCONTINUED | OUTPATIENT
Start: 2024-03-11 | End: 2024-03-15 | Stop reason: HOSPADM

## 2024-03-11 RX ORDER — KETOROLAC TROMETHAMINE 15 MG/ML
30 INJECTION, SOLUTION INTRAMUSCULAR; INTRAVENOUS ONCE
Status: COMPLETED | OUTPATIENT
Start: 2024-03-11 | End: 2024-03-11

## 2024-03-11 RX ORDER — ENOXAPARIN SODIUM 100 MG/ML
40 INJECTION SUBCUTANEOUS DAILY
Status: DISCONTINUED | OUTPATIENT
Start: 2024-03-12 | End: 2024-03-15 | Stop reason: HOSPADM

## 2024-03-11 RX ORDER — LORAZEPAM 1 MG/1
3 TABLET ORAL
Status: DISCONTINUED | OUTPATIENT
Start: 2024-03-11 | End: 2024-03-15 | Stop reason: HOSPADM

## 2024-03-11 RX ORDER — SODIUM CHLORIDE 9 MG/ML
INJECTION, SOLUTION INTRAVENOUS PRN
Status: DISCONTINUED | OUTPATIENT
Start: 2024-03-11 | End: 2024-03-15 | Stop reason: HOSPADM

## 2024-03-11 RX ORDER — POLYETHYLENE GLYCOL 3350 17 G/17G
17 POWDER, FOR SOLUTION ORAL DAILY PRN
Status: DISCONTINUED | OUTPATIENT
Start: 2024-03-11 | End: 2024-03-15 | Stop reason: HOSPADM

## 2024-03-11 RX ORDER — LORAZEPAM 1 MG/1
4 TABLET ORAL
Status: DISCONTINUED | OUTPATIENT
Start: 2024-03-11 | End: 2024-03-15 | Stop reason: HOSPADM

## 2024-03-11 RX ORDER — THIAMINE HYDROCHLORIDE 100 MG/ML
200 INJECTION, SOLUTION INTRAMUSCULAR; INTRAVENOUS DAILY
Status: COMPLETED | OUTPATIENT
Start: 2024-03-11 | End: 2024-03-13

## 2024-03-11 RX ORDER — NADOLOL 20 MG/1
20 TABLET ORAL DAILY
Status: DISCONTINUED | OUTPATIENT
Start: 2024-03-12 | End: 2024-03-15 | Stop reason: HOSPADM

## 2024-03-11 RX ORDER — ONDANSETRON 4 MG/1
4 TABLET, ORALLY DISINTEGRATING ORAL EVERY 8 HOURS PRN
Status: DISCONTINUED | OUTPATIENT
Start: 2024-03-11 | End: 2024-03-11

## 2024-03-11 RX ORDER — SODIUM CHLORIDE 0.9 % (FLUSH) 0.9 %
5-40 SYRINGE (ML) INJECTION EVERY 12 HOURS SCHEDULED
Status: DISCONTINUED | OUTPATIENT
Start: 2024-03-11 | End: 2024-03-15 | Stop reason: HOSPADM

## 2024-03-11 RX ORDER — ONDANSETRON 2 MG/ML
4 INJECTION INTRAMUSCULAR; INTRAVENOUS EVERY 6 HOURS PRN
Status: DISCONTINUED | OUTPATIENT
Start: 2024-03-11 | End: 2024-03-15 | Stop reason: HOSPADM

## 2024-03-11 RX ORDER — POTASSIUM CHLORIDE 7.45 MG/ML
10 INJECTION INTRAVENOUS PRN
Status: DISCONTINUED | OUTPATIENT
Start: 2024-03-11 | End: 2024-03-15 | Stop reason: HOSPADM

## 2024-03-11 RX ORDER — LORAZEPAM 0.5 MG/1
0.5 TABLET ORAL
Status: DISCONTINUED | OUTPATIENT
Start: 2024-03-11 | End: 2024-03-15 | Stop reason: HOSPADM

## 2024-03-11 RX ORDER — POTASSIUM CHLORIDE 20 MEQ/1
40 TABLET, EXTENDED RELEASE ORAL ONCE
Status: COMPLETED | OUTPATIENT
Start: 2024-03-11 | End: 2024-03-11

## 2024-03-11 RX ORDER — ATORVASTATIN CALCIUM 40 MG/1
40 TABLET, FILM COATED ORAL NIGHTLY
Status: DISCONTINUED | OUTPATIENT
Start: 2024-03-11 | End: 2024-03-15 | Stop reason: HOSPADM

## 2024-03-11 RX ADMIN — Medication 500 MG: at 20:04

## 2024-03-11 RX ADMIN — SODIUM CHLORIDE: 9 INJECTION, SOLUTION INTRAVENOUS at 16:56

## 2024-03-11 RX ADMIN — SODIUM CHLORIDE, PRESERVATIVE FREE 10 ML: 5 INJECTION INTRAVENOUS at 20:05

## 2024-03-11 RX ADMIN — ONDANSETRON 4 MG: 2 INJECTION INTRAMUSCULAR; INTRAVENOUS at 20:06

## 2024-03-11 RX ADMIN — KETOROLAC TROMETHAMINE 30 MG: 15 INJECTION, SOLUTION INTRAMUSCULAR; INTRAVENOUS at 14:14

## 2024-03-11 RX ADMIN — THIAMINE HYDROCHLORIDE 200 MG: 100 INJECTION, SOLUTION INTRAMUSCULAR; INTRAVENOUS at 12:54

## 2024-03-11 RX ADMIN — ATORVASTATIN CALCIUM 40 MG: 40 TABLET, FILM COATED ORAL at 20:05

## 2024-03-11 RX ADMIN — POTASSIUM CHLORIDE 40 MEQ: 1500 TABLET, EXTENDED RELEASE ORAL at 14:18

## 2024-03-11 RX ADMIN — IOPAMIDOL 75 ML: 755 INJECTION, SOLUTION INTRAVENOUS at 11:14

## 2024-03-11 RX ADMIN — MORPHINE SULFATE 2 MG: 2 INJECTION, SOLUTION INTRAMUSCULAR; INTRAVENOUS at 18:50

## 2024-03-11 RX ADMIN — ONDANSETRON 4 MG: 2 INJECTION INTRAMUSCULAR; INTRAVENOUS at 14:14

## 2024-03-11 ASSESSMENT — PAIN SCALES - GENERAL
PAINLEVEL_OUTOF10: 2
PAINLEVEL_OUTOF10: 5
PAINLEVEL_OUTOF10: 5
PAINLEVEL_OUTOF10: 2
PAINLEVEL_OUTOF10: 2

## 2024-03-11 ASSESSMENT — PAIN DESCRIPTION - DESCRIPTORS
DESCRIPTORS: ACHING

## 2024-03-11 ASSESSMENT — PAIN DESCRIPTION - LOCATION
LOCATION: CHEST

## 2024-03-11 ASSESSMENT — PAIN SCALES - WONG BAKER
WONGBAKER_NUMERICALRESPONSE: 2
WONGBAKER_NUMERICALRESPONSE: HURTS A LITTLE BIT

## 2024-03-11 ASSESSMENT — PAIN DESCRIPTION - ORIENTATION: ORIENTATION: MID

## 2024-03-11 ASSESSMENT — PAIN DESCRIPTION - ONSET: ONSET: ON-GOING

## 2024-03-11 ASSESSMENT — PAIN DESCRIPTION - PAIN TYPE: TYPE: ACUTE PAIN

## 2024-03-11 ASSESSMENT — PAIN DESCRIPTION - FREQUENCY: FREQUENCY: INTERMITTENT

## 2024-03-11 ASSESSMENT — PAIN - FUNCTIONAL ASSESSMENT: PAIN_FUNCTIONAL_ASSESSMENT: ACTIVITIES ARE NOT PREVENTED

## 2024-03-11 NOTE — ED NOTES
Noted pt oxygen saturation drop to 88-91% on room air during pt falling a sleep, pt stating does not have hx of sleep apnea, will continue to monitor.   IV doses cancelled as patient would rather take it orally and go home to bed.

## 2024-03-11 NOTE — ED PROVIDER NOTES
then 2 weeks ago developed the symptoms.  Will obtain basic labs along EKG and chest x-ray.  EKG reveals diffuse J-point ST elevation across many leads; repole versus myocarditis/pericarditis.  No previous EKG noted in the chart.  Ventricular rate is 85 bpm.  QTc is 420 ms and QRS is 94 ms.  178 ms.  No ST depressions noted.    Labs: Initial troponin is unremarkable.  CMP reveals elevated glucose 171 and elevated liver enzymes with alkaline phosphatase 224,   and total bilirubin 2.9.  Patient states he has history of alcohol abuse although he states he cut back on it.  His previous labs however Have been within normal limits.  He has no abdominal tenderness or peritoneal signs on palpation.  D-dimer is elevated at 0.99.  CTPA does not show central PE to me.  Radiology report is currently pending as patient is admitted by the hospitalist service  I did consult with cardiologist on-call who agrees admission and will see the patient in the hospital.  Therefore patient has been about the hospital service.  Plan of care was discussed with patient and he is in agreement    Clinical Impression:  1. Chest pain, unspecified type    2. Shortness of breath      Disposition referral (if applicable):  No follow-up provider specified.  Disposition medications (if applicable):  New Prescriptions    No medications on file     ED Provider Disposition Time  DISPOSITION        Comment: Please note this report has been produced using speech recognition software and may contain errors related to that system including errors in grammar, punctuation, and spelling, as well as words and phrases that may be inappropriate.  Efforts were made to edit the dictations.       Ham Bazzi MD  03/11/24 5112

## 2024-03-11 NOTE — ED NOTES
Placed pt on 1 L NC oxygen to help with oxygen saturation during a period of falling a sleep, will continue to monitor.

## 2024-03-11 NOTE — ED NOTES
Noted T depression on a monitor, printed and made  Aware, EKG repeated,  At the bedside, will continue to monitor.

## 2024-03-11 NOTE — CONSULTS
CARDIOLOGY CONSULT NOTE         Reason for consultation: Epigastric pain/chest pain      Primary care physician: John Blue MD      Chief Complaints :  Chief Complaint   Patient presents with    Chest Pain    Cough    Nausea    Illness        History of present illness:Harsha is a 53 y.o.year old male who is admitted with epigastric/chest pain.  We are asked to see him for further evaluation.  Patient states that his pain is ongoing for past 2 to 3 days but today it was quite severe.  Pain is not related to activity.  Does not get better with position changes either.  There is no pleuritic component to this pain.  Review of Systems:     All systems negative except as marked.        Physical Examination:    Vitals:    03/11/24 1842   BP:    Pulse:    Resp: 14   Temp:    SpO2:         General Appearance:  No distress, conversant    Constitutional:  No acute distress, non-toxic appearance.    HENT:  Normocephalic, Atraumatic,   Eyes:  PERRL, EOMI, Conjunctiva normal, No discharge.   Respiratory:  No respiratory distress, No wheezing  Cardiovascular: S1, S2, no murmurs, gallops. JVD wnl  Abdomen /GI:   Soft, No tenderness   Genitourinary: No costovertebral angle tenderness   Musculoskeletal:  No edema, no tenderness, no deformities.   Integument:  Well hydrated, no rash   Neurologic:  Alert & oriented x 3, no focal deficits noted       Medical decision making and Data review:    Lab Review   Recent Labs     03/11/24  0734   WBC 5.7   HGB 17.2   HCT 48.0   PLT 74*      Recent Labs     03/11/24  0845      K 3.4*   CL 97*   CO2 26   BUN 11   CREATININE 0.6*     Recent Labs     03/11/24  0845   *   *   BILITOT 2.9*   ALKPHOS 224*     No results for input(s): \"TROPONINT\" in the last 72 hours.    No results for input(s): \"PROBNP\" in the last 72 hours.  No results found for: \"INR\", \"PROTIME\"    EKG: (reviewed by myself): His EKG showed sinus rhythm with concave ST segments.    ECHO:(reviewed by  myself) his echocardiogram from today showed EF of 55% with normal wall motion.    His CTA chest was reviewed and did not show any acute disease.      All labs, medications and tests reviewed by myself including data  from outside source , patient and available family .  Continue all other medications of all above medical condition listed as is.     Impression and Recommendations:    Atypical chest pain  Abnormal LFTs      53 y.o.year old with above medical history.  Patient's EKG and enzymes are not suggestive of acute heart attack.  There is a possibility of pericarditis.  Patient also has abnormal LFTs and could have underlying GI pathology.  At present we will do conservative treatment.  We will also check sed rate and CRP levels.  We will start patient on naproxen 500 mg twice a day.    Thank you  much for consult and giving us the opportunity in contributing in the care of this patient. Please feel free to call me for any questions.       Audrey Disla MD, 3/11/2024 6:52 PM

## 2024-03-11 NOTE — ED NOTES
Medication History  [facility]    Patient Name: Harsha Liu 1970     Medication history has been completed by: Cori Abel CPhT    Source(s) of information: patient and insurance claims     Primary Care Physician: John Blue MD     Pharmacy: [facility]    Allergies as of 03/11/2024    (No Known Allergies)        Prior to Admission medications    Medication Sig Start Date End Date Taking? Authorizing Provider   Omega 3 1000 MG CAPS Take 1 capsule by mouth in the morning and at bedtime Takes OTC   Yes Dm Pickens MD   PARoxetine (PAXIL) 20 MG tablet Take 1.5 tablets by mouth every morning 12/12/23   John Blue MD   nadolol (CORGARD) 20 MG tablet Take 1 tablet by mouth daily 12/12/23   John Blue MD   acamprosate (CAMPRAL) 333 MG tablet take 1 tablet THREE TIMES DAILY with food  Patient not taking: Reported on 3/11/2024 12/12/23   John Blue MD   LORazepam (ATIVAN) 0.5 MG tablet 2 tablets as needed.  Patient not taking: Reported on 3/11/2024    ProviderDm MD     Medications removed from list (include reason, ex. noncompliance, medication cost, therapy complete etc.):   Ondansetron therapy complete  Promethazine therapy complete  Fenofibrate not taking    Comments:  Medication list reviewed with patient and insurance claims verified.  Patient reports he is only taking his Nadolol, paroxetine and fish oil. Reports takes no other medications.    To my knowledge the above medication history is accurate as of 3/11/2024 9:12 AM.   Cori Abel CPhT   3/11/2024 9:12 AM

## 2024-03-11 NOTE — H&P
V2.0  History and Physical      Name:  Harsha Liu /Age/Sex: 1970  (53 y.o. male)   MRN & CSN:  [id number] & [id number] Encounter Date/Time: 3/11/2024 12:00 PM EDT   Location:   PCP: John Blue MD       Hospital Day: 1    Assessment and Plan:   Harsha Liu is a 53 y.o. male with a pmh of alcohol abuse,  who presents with Chest pain, rule out acute myocardial infarction    Hospital Problems             Last Modified POA    * (Principal) Chest pain, rule out acute myocardial infarction 3/11/2024 Yes       Chest pain  Concave pattern of ST elevation in all precordial leads  Recent upper respiratory viral infection  Suspect pericarditis  -D-dimer level was elevated and CTA pulmonary with contrast was done which was negative for pulmonary embolism  -monitor on tele  -repeat stat troponin; trend for 2 more labs  -trial ketorolac 30 mg IV once  -cardiology consulted - follow recs  -PRN troponin and EKG  -Obtain echo    Alcohol abuse  History of alcohol withdrawal requiring admission  -not taking acamprosate  -drinks upto 4 beers a day  -last drink day before presentation  -reports that he has gone few days without drinking and has not had withdrawal symptoms  -CIWA protocol with ativan only for now    Mild hypokalemia  -give potassium chloride 40 meq once    Suspect obstructive sleep apnea  -Patient was noted to have mild hypoxia when sleeping  -Obtain overnight apnea evaluation done    Anxiety/depression  -resume paroxetine 20 mg daily      Disposition:     Diet No diet orders on file   DVT Prophylaxis [x] Lovenox, []  Heparin, [] SCDs, [] Ambulation,  [] Eliquis, [] Xarelto  [] Coumadin   Peptic ulcer prophylaxis -   Code Status No Order   Disposition From / Current living situation : home  Expected Disposition: home  Estimated Date of Discharge: 1-2 days  Patient requires continued admission due to pending workup    Surrogate Decision Maker/ POA -     History from:  normal. Bony structures are unremarkable.     No CT evidence of pulmonary embolism. No acute findings in the chest. Marked diffuse decreased attenuation of the liver, consistent with fatty infiltration. Electronically signed by Chrissie Knight MD    XR CHEST PORTABLE    Result Date: 3/11/2024  History: Chest pain. Comparison: None. Findings: Portable AP view of the chest was obtained. The lungs are clear. Cardiomediastinal contours are normal. There is no pleural effusion or pneumothorax.     Impression: 1. No acute cardiopulmonary disease. Electronically signed by José Redmond MD          Electronically signed by Robin Sommers MD on 3/11/2024 at 12:00 PM      Comment: Please note this report has been produced using speech recognition software and may contain errors related to that system including errors in grammar, punctuation, and spelling, as well as words and phrases that may be inappropriate. If there are any questions or concerns please feel free to contact the dictating provider for clarification.

## 2024-03-11 NOTE — ED NOTES
ED TO INPATIENT SBAR HANDOFF    Patient Name: Harsha Liu   :  1970  53 y.o.   Preferred Name  Harsha  Family/Caregiver Present no   Restraints no   C-SSRS: Risk of Suicide: No Risk  Sitter no   Sepsis Risk Score Sepsis Risk Score: 0.6      Situation  Chief Complaint   Patient presents with    Chest Pain    Cough    Nausea    Illness     Brief Description of Patient's Condition: Chest pain  Mental Status: oriented and alert  Arrived from: home    Imaging:   CTA PULMONARY W CONTRAST   Final Result      No CT evidence of pulmonary embolism.      No acute findings in the chest.      Marked diffuse decreased attenuation of the liver, consistent with fatty infiltration.      Electronically signed by Chrissie Knight MD      XR CHEST PORTABLE   Final Result   Impression:   1. No acute cardiopulmonary disease.      Electronically signed by José Redmond MD        Abnormal labs:   Abnormal Labs Reviewed   CBC WITH AUTO DIFFERENTIAL - Abnormal; Notable for the following components:       Result Value    RDW 16.6 (*)     Platelets 74 (*)     MPV 11.3 (*)     Monocytes % 11.4 (*)     Basophils % 1.1 (*)     All other components within normal limits   COMPREHENSIVE METABOLIC PANEL - Abnormal; Notable for the following components:    Potassium 3.4 (*)     Chloride 97 (*)     Glucose 171 (*)     Creatinine 0.6 (*)     Calcium 7.8 (*)     Total Protein 6.1 (*)     Albumin 3.2 (*)     Total Bilirubin 2.9 (*)     Alkaline Phosphatase 224 (*)      (*)      (*)     All other components within normal limits   LIPASE - Abnormal; Notable for the following components:    Lipase 73 (*)     All other components within normal limits   D-DIMER, RAPID - Abnormal; Notable for the following components:    D-Dimer, Quant 0.99 (*)     All other components within normal limits       Background  History:   Past Medical History:   Diagnosis Date    Anxiety     Hernia, inguinal, left        Assessment    Vitals:    Level of

## 2024-03-11 NOTE — PROGRESS NOTES
4 Eyes Skin Assessment     NAME:  Harsha Liu  YOB: 1970  MEDICAL RECORD NUMBER:  [id number]    The patient is being assessed for  Admission    I agree that at least one RN has performed a thorough Head to Toe Skin Assessment on the patient. ALL assessment sites listed below have been assessed.      Areas assessed by both nurses:    Head, Face, Ears, Shoulders, Back, Chest, Arms, Elbows, Hands, Sacrum. Buttock, Coccyx, Ischium, Legs. Feet and Heels, Under Medical Devices , and Other          Does the Patient have a Wound? No noted wound(s)       Adrian Prevention initiated by RN: No  Wound Care Orders initiated by RN: No    Pressure Injury (Stage 3,4, Unstageable, DTI, NWPT, and Complex wounds) if present, place Wound referral order by RN under : No    New Ostomies, if present place, Ostomy referral order under : No     Nurse 1 eSignature: Electronically signed by Judy Yanes RN on 3/11/24 at 4:15 PM EDT    **SHARE this note so that the co-signing nurse can place an eSignature**    Nurse 2 eSignature: Electronically signed by Flako Newman RN on 3/11/24 at 4:37 PM EDT

## 2024-03-11 NOTE — ED NOTES
Increased pt oxygen to 3 L NC at this time due to continuing to drop oxygen during the sleep, made  Aware, per  As long as pt saturating above 89% continue to monitor at this time.

## 2024-03-11 NOTE — PLAN OF CARE
Problem: Discharge Planning  Goal: Discharge to home or other facility with appropriate resources  Outcome: Progressing     Problem: Pain  Goal: Verbalizes/displays adequate comfort level or baseline comfort level  Outcome: Progressing  Flowsheets (Taken 3/11/2024 1601)  Verbalizes/displays adequate comfort level or baseline comfort level:   Assess pain using appropriate pain scale   Encourage patient to monitor pain and request assistance

## 2024-03-11 NOTE — ED NOTES
Increased pt oxygen to 2 L NC at this time due to continuing to drop oxygen during the sleep, made Dr. Rosado will continue to monitor.

## 2024-03-12 ENCOUNTER — APPOINTMENT (OUTPATIENT)
Dept: NON INVASIVE DIAGNOSTICS | Age: 54
End: 2024-03-12
Attending: INTERNAL MEDICINE

## 2024-03-12 ENCOUNTER — APPOINTMENT (OUTPATIENT)
Dept: ULTRASOUND IMAGING | Age: 54
End: 2024-03-12

## 2024-03-12 LAB
ALBUMIN SERPL-MCNC: 3 GM/DL (ref 3.4–5)
ALP BLD-CCNC: 217 IU/L (ref 40–129)
ALT SERPL-CCNC: 212 U/L (ref 10–40)
ANION GAP SERPL CALCULATED.3IONS-SCNC: 11 MMOL/L (ref 7–16)
AST SERPL-CCNC: 355 IU/L (ref 15–37)
BASOPHILS ABSOLUTE: 0 K/CU MM
BASOPHILS RELATIVE PERCENT: 0.9 % (ref 0–1)
BILIRUB SERPL-MCNC: 3.7 MG/DL (ref 0–1)
BILIRUBIN DIRECT: 2.8 MG/DL (ref 0–0.3)
BILIRUBIN, INDIRECT: 0.9 MG/DL (ref 0–0.7)
BUN SERPL-MCNC: 12 MG/DL (ref 6–23)
CALCIUM SERPL-MCNC: 7.6 MG/DL (ref 8.3–10.6)
CHLORIDE BLD-SCNC: 101 MMOL/L (ref 99–110)
CO2: 27 MMOL/L (ref 21–32)
CREAT SERPL-MCNC: 0.8 MG/DL (ref 0.9–1.3)
CRP SERPL HS-MCNC: 3 MG/L
DIFFERENTIAL TYPE: ABNORMAL
EKG ATRIAL RATE: 72 BPM
EKG DIAGNOSIS: NORMAL
EKG P AXIS: 52 DEGREES
EKG P-R INTERVAL: 198 MS
EKG Q-T INTERVAL: 394 MS
EKG QRS DURATION: 102 MS
EKG QTC CALCULATION (BAZETT): 431 MS
EKG R AXIS: 54 DEGREES
EKG T AXIS: 43 DEGREES
EKG VENTRICULAR RATE: 72 BPM
EOSINOPHILS ABSOLUTE: 0.1 K/CU MM
EOSINOPHILS RELATIVE PERCENT: 2 % (ref 0–3)
GFR SERPL CREATININE-BSD FRML MDRD: >60 ML/MIN/1.73M2
GLUCOSE SERPL-MCNC: 104 MG/DL (ref 70–99)
HAV IGM SERPL QL IA: NON REACTIVE
HBV CORE IGM SERPL QL IA: NON REACTIVE
HBV SURFACE AG SERPL QL IA: NON REACTIVE
HCT VFR BLD CALC: 47.4 % (ref 42–52)
HCV AB SERPL QL IA: NON REACTIVE
HEMOGLOBIN: 16.1 GM/DL (ref 13.5–18)
IMMATURE NEUTROPHIL %: 0.2 % (ref 0–0.43)
LYMPHOCYTES ABSOLUTE: 1.7 K/CU MM
LYMPHOCYTES RELATIVE PERCENT: 36.3 % (ref 24–44)
MAGNESIUM: 1.7 MG/DL (ref 1.8–2.4)
MCH RBC QN AUTO: 28 PG (ref 27–31)
MCHC RBC AUTO-ENTMCNC: 34 % (ref 32–36)
MCV RBC AUTO: 82.4 FL (ref 78–100)
MONOCYTES ABSOLUTE: 0.5 K/CU MM
MONOCYTES RELATIVE PERCENT: 11.5 % (ref 0–4)
NUCLEATED RBC %: 0 %
PDW BLD-RTO: 16.8 % (ref 11.7–14.9)
PHOSPHORUS: 3.2 MG/DL (ref 2.5–4.9)
PLATELET # BLD: 71 K/CU MM (ref 140–440)
PMV BLD AUTO: 12.1 FL (ref 7.5–11.1)
POTASSIUM SERPL-SCNC: 4.4 MMOL/L (ref 3.5–5.1)
RBC # BLD: 5.75 M/CU MM (ref 4.6–6.2)
SEGMENTED NEUTROPHILS ABSOLUTE COUNT: 2.2 K/CU MM
SEGMENTED NEUTROPHILS RELATIVE PERCENT: 49.1 % (ref 36–66)
SODIUM BLD-SCNC: 139 MMOL/L (ref 135–145)
TOTAL IMMATURE NEUTOROPHIL: 0.01 K/CU MM
TOTAL NUCLEATED RBC: 0 K/CU MM
TOTAL PROTEIN: 5.6 GM/DL (ref 6.4–8.2)
WBC # BLD: 4.5 K/CU MM (ref 4–10.5)

## 2024-03-12 PROCEDURE — 99232 SBSQ HOSP IP/OBS MODERATE 35: CPT | Performed by: INTERNAL MEDICINE

## 2024-03-12 PROCEDURE — 82248 BILIRUBIN DIRECT: CPT

## 2024-03-12 PROCEDURE — 94762 N-INVAS EAR/PLS OXIMTRY CONT: CPT

## 2024-03-12 PROCEDURE — 94761 N-INVAS EAR/PLS OXIMETRY MLT: CPT

## 2024-03-12 PROCEDURE — 1200000000 HC SEMI PRIVATE

## 2024-03-12 PROCEDURE — 6370000000 HC RX 637 (ALT 250 FOR IP): Performed by: STUDENT IN AN ORGANIZED HEALTH CARE EDUCATION/TRAINING PROGRAM

## 2024-03-12 PROCEDURE — APPNB15 APP NON BILLABLE TIME 0-15 MINS

## 2024-03-12 PROCEDURE — 6370000000 HC RX 637 (ALT 250 FOR IP): Performed by: INTERNAL MEDICINE

## 2024-03-12 PROCEDURE — 76705 ECHO EXAM OF ABDOMEN: CPT

## 2024-03-12 PROCEDURE — 6360000002 HC RX W HCPCS: Performed by: INTERNAL MEDICINE

## 2024-03-12 PROCEDURE — 85025 COMPLETE CBC W/AUTO DIFF WBC: CPT

## 2024-03-12 PROCEDURE — 80074 ACUTE HEPATITIS PANEL: CPT

## 2024-03-12 PROCEDURE — 6360000002 HC RX W HCPCS: Performed by: STUDENT IN AN ORGANIZED HEALTH CARE EDUCATION/TRAINING PROGRAM

## 2024-03-12 PROCEDURE — 2580000003 HC RX 258: Performed by: INTERNAL MEDICINE

## 2024-03-12 PROCEDURE — 83735 ASSAY OF MAGNESIUM: CPT

## 2024-03-12 PROCEDURE — 84100 ASSAY OF PHOSPHORUS: CPT

## 2024-03-12 PROCEDURE — 80053 COMPREHEN METABOLIC PANEL: CPT

## 2024-03-12 PROCEDURE — 93010 ELECTROCARDIOGRAM REPORT: CPT | Performed by: INTERNAL MEDICINE

## 2024-03-12 PROCEDURE — 36415 COLL VENOUS BLD VENIPUNCTURE: CPT

## 2024-03-12 RX ORDER — CHLORDIAZEPOXIDE HYDROCHLORIDE 25 MG/1
25 CAPSULE, GELATIN COATED ORAL EVERY 6 HOURS
Status: DISCONTINUED | OUTPATIENT
Start: 2024-03-12 | End: 2024-03-12

## 2024-03-12 RX ORDER — MAGNESIUM SULFATE IN WATER 40 MG/ML
2000 INJECTION, SOLUTION INTRAVENOUS ONCE
Status: COMPLETED | OUTPATIENT
Start: 2024-03-12 | End: 2024-03-12

## 2024-03-12 RX ORDER — CHLORDIAZEPOXIDE HYDROCHLORIDE 25 MG/1
25 CAPSULE, GELATIN COATED ORAL EVERY 8 HOURS
Status: DISCONTINUED | OUTPATIENT
Start: 2024-03-13 | End: 2024-03-13

## 2024-03-12 RX ADMIN — Medication 500 MG: at 07:28

## 2024-03-12 RX ADMIN — ASPIRIN 81 MG: 81 TABLET, CHEWABLE ORAL at 09:12

## 2024-03-12 RX ADMIN — CHLORDIAZEPOXIDE HYDROCHLORIDE 25 MG: 25 CAPSULE ORAL at 11:59

## 2024-03-12 RX ADMIN — PANTOPRAZOLE SODIUM 40 MG: 40 TABLET, DELAYED RELEASE ORAL at 05:56

## 2024-03-12 RX ADMIN — MAGNESIUM SULFATE 2000 MG: 2 INJECTION INTRAVENOUS at 09:59

## 2024-03-12 RX ADMIN — PAROXETINE 30 MG: 10 TABLET, FILM COATED ORAL at 09:13

## 2024-03-12 RX ADMIN — SODIUM CHLORIDE, PRESERVATIVE FREE 10 ML: 5 INJECTION INTRAVENOUS at 09:13

## 2024-03-12 RX ADMIN — SODIUM CHLORIDE: 9 INJECTION, SOLUTION INTRAVENOUS at 06:05

## 2024-03-12 RX ADMIN — CHLORDIAZEPOXIDE HYDROCHLORIDE 25 MG: 25 CAPSULE ORAL at 17:19

## 2024-03-12 RX ADMIN — PANTOPRAZOLE SODIUM 40 MG: 40 TABLET, DELAYED RELEASE ORAL at 17:19

## 2024-03-12 RX ADMIN — NADOLOL 20 MG: 20 TABLET ORAL at 09:12

## 2024-03-12 RX ADMIN — Medication 500 MG: at 17:19

## 2024-03-12 RX ADMIN — ENOXAPARIN SODIUM 40 MG: 100 INJECTION SUBCUTANEOUS at 09:12

## 2024-03-12 RX ADMIN — SODIUM CHLORIDE, PRESERVATIVE FREE 10 ML: 5 INJECTION INTRAVENOUS at 20:54

## 2024-03-12 RX ADMIN — MORPHINE SULFATE 2 MG: 2 INJECTION, SOLUTION INTRAMUSCULAR; INTRAVENOUS at 00:32

## 2024-03-12 RX ADMIN — ATORVASTATIN CALCIUM 40 MG: 40 TABLET, FILM COATED ORAL at 20:52

## 2024-03-12 RX ADMIN — THIAMINE HYDROCHLORIDE 200 MG: 100 INJECTION, SOLUTION INTRAMUSCULAR; INTRAVENOUS at 09:13

## 2024-03-12 ASSESSMENT — PAIN DESCRIPTION - LOCATION
LOCATION: CHEST

## 2024-03-12 ASSESSMENT — PAIN DESCRIPTION - DESCRIPTORS
DESCRIPTORS: ACHING

## 2024-03-12 ASSESSMENT — PAIN SCALES - WONG BAKER
WONGBAKER_NUMERICALRESPONSE: HURTS A LITTLE BIT
WONGBAKER_NUMERICALRESPONSE: 2

## 2024-03-12 ASSESSMENT — PAIN SCALES - GENERAL
PAINLEVEL_OUTOF10: 6
PAINLEVEL_OUTOF10: 2
PAINLEVEL_OUTOF10: 5

## 2024-03-12 NOTE — DISCHARGE INSTRUCTIONS
Repeat your blood tests in approximately 3-5 days. Follow up with the GI doctor in 2-4 weeks. Follow up with your primary care provider within 1 week. Please abstain from alcohol, it will worsen your condition. Seek immediate medical attention with any sign of bleeding.      GENERAL INFORMATION AND RESOURCES FOR ADDICTION AND RECOVERY    12-Step Education:  Please attend a 12-Step meeting each day (such as AA or NA).  We recommend you make it a goal to attend 90 meetings in 90 days.  There are online meetings and phone meetings available as well.    You can ask for a temporary sponsor at the first meeting.  Contact your sponsor daily so you can work the 12 Steps together.    Read AA's The Big Book. You can get a copy at any meeting.    Many 12-step programs have free apps to be downloaded onto your smartphone or tablet.    Exercise helps to decrease cravings, therefore exercise as tolerated.     Practice using “HALT\" by making sure you are not getting too Hungry, Angry, Lonely, or Tired.     Avoid people, places, and things that trigger you to use.    Helpful phone numbers/website:   Free Crisis Hotline: 6-507-453-TALK (1-397.759.1221)  24/7 crisis line for [facility]: 114.372.9702  24-hour crisis text hotline: Text the word \"4hope\" to 810-839 for crisis support. Texting this number is free if you have Verizon, T-Mobile, AT[employer]T or Sprint.  [place] of Access, Homeless Shelter Intake Hotline: 231.759.8378  [facility] www.SurgiLight.Live Gamer  [place] Quit Line (smoking) https://[place].quitlogix.org 800-QUIT-NOW (113-710-3738)  Alcoholics Anonymous/ ALANON/ALATEEN 240-980-7603 or 560-645-5030  https://cogf.meetingfor.me/meetings or www.aacentral[place].org      Narcotics Anonymous 231-0464 or 077-453-2813   http://sascna.org/ or www.nacentral[place].org  Cocaine Anonymous 407-493-1138 www.ca[place].org   Marijuana Anonymous 643-014-1866 www.marijuana-anonymous.org   Mental Health Crisis Line: 608.742.9350  [place] [address]  [phone]  [address] [place] [address] [phone]    [facility] (various locations)   One intake # [phone]  [facility]  [address] [place]  [address]    [facility]   [address] [place]  [address]    [facility]   [address] [place]  [address]    [facility]  [address] [place] [address]  Phone: [phone]  Fax: [phone]    [facility]  [place], [place]  Toll Free in [place] [phone], intake extension 1220 or 1246    [facility]   [address] [place] [address]      Phone: [phone]      [facility]- Women only  [address] [place] [address]  [phone]    [facility] [place]  [facility]   [address] [place]  [address] [phone] Fax    [facility]   [address] [place], [place]  [address] [phone]    [place] [facility] (multiple locations/ call for info)    [facility]  [address]   [address]   [place]  [address] [phone]      [facility] (pregnant and new moms)  [address] [place] [address]  Tel: [phone]  Fax: [phone]    [facility]  [address] [place], [place], [address]  [phone]    [address] [address]   [phone]?    [facility]  [address] [place], [place] - [address] [phone]      [facility]  Women's  [address]  [place] [address] [phone]    Men's  [address]  [place] [address]  [phone]    [facility] ([facility])   [phone]    [facility]   [address]

## 2024-03-12 NOTE — PROGRESS NOTES
[place] [address]  Phone: [phone]    Fax [phone]                  Yumiko Hudson MD, [facility]       Stan Antonio MD, [facility]  Paco Mclain MD, [facility]    MD Whitney Larsen MD Tariq Rizvi, MD Bilal Alam, MD Dr. Waseem Sajjad MD Melissa Kellis, APRN      Love Jeffrey, APRGAYLA Garrett, APRGAYLA Marroquin, APRN  Jordan Ferguson PA-C    CARDIOLOGY  NOTE      Name:  Harsha Liu /Age/Sex: 1970  (53 y.o. male)   MRN & CSN:  [id number] & [id number] Admission Date/Time: 3/11/2024  7:14 AM   Location:  [address] [place] [address] PCP: John Blue MD       Hospital Day: 2    - Cardiology consult is for:  Epigastric/Chest pain      ASSESSMENT/ PLAN:  Atypical epigastric/chest pain  Recent upper respiratory infection  Transaminitis  -EKG showing concave ST segments.  -Echo showing preserved EF without wall motion abnormalities.  Low likelihood of CAD  -CTA pulm negative for PE  -Obtaining abdominal ultrasound to evaluate elevated LFTs  -On Protonix 40 mg twice daily  -Initiated on naproxen 500 mg twice a day for possible pericarditis.  Cautious use with alcohol and concerns for peptic ulcer disease.  Hyperlipidemia  Alcohol withdrawal  -Could be contributing to epigastric pain  Suspected sleep apnea:   -AHI 13.1.  Suggestive of mild sleep apnea.  Patient will require outpatient sleep study.  Will benefit from CPAP        Echo 3/11/2024    Left Ventricle: Normal left ventricular systolic function with a visually estimated EF of 55%. Left ventricle size is normal. Mildly increased wall thickness. Normal wall motion. Normal diastolic function.    Aortic Valve: Sclerotic, but non-stenotic aortic valve; large calcification noted on non-coronary cusp.    Pericardium: There is evidence of epicardial fat. No pericardial effusion.    Subjective:  Harsha is a 53 y.o.year old     Patient  reviewed          Jordan Ferguson PA-C, 3/12/2024 3:17 PM     CARDIOLOGY ATTENDING ADDENDUM    MEDICAL DECISION MAKING:    Epigastric/chest discomfort  Abnormal LFTs    Patient's EKG and troponins are normal.  His chest pain is atypical.  Will not recommend any further cardiac workup at present.  We will sign off.  Please call with questions.          HPI:  I have reviewed the HPI  And agree with above   Harsha is a 53 y.o.year old who and presents with had concerns including Chest Pain, Cough, Nausea, and Illness.  Chief Complaint   Patient presents with    Chest Pain    Cough    Nausea    Illness     Please review addendum/changes made to note above   Interval history: Patient feels better.  His pain is resolved.    Physical Exam:  General:  Awake, alert, NAD  Head:normal  Eye:normal  Neck:  No JVD   Chest:  Clear to auscultation, respiration easy  Cardiovascular: Regular pulse   Abdomen:   nontender  Extremities: No edema  Pulses; palpable  Neuro: grossly normal        I agree with the plan, which was planned by myself and discussed with advanced level provider.  My documented MDM is a substantive portion of the supervisory note.    I have seen ,spoken to  and examined this patient personally, independently of the advanced level provider.  I have spent substantiate  portion of this encounter independently myself in examining patient and developing the medical management plan . I have reviewed the hospital care given to date and reviewed all pertinent labs and imaging.The plan was developed mutually at the time of the visit with the patient,  NP /PA  and myself. I have spoken with patient, nursing staff and provided written and verbal instructions .The above note has been reviewed and I agree with the assessment, diagnosis, and treatment plan with changes made by me as follows .    Audrey Disla MD

## 2024-03-12 NOTE — CARE COORDINATION
03/12/24 1154   Service Assessment   Patient Orientation Person;Place;Situation;Alert and Oriented   Cognition Alert   History Provided By Patient   Primary Caregiver Self   Support Systems Spouse/Significant Other   Patient's Healthcare Decision Maker is: Legal Next of Kin   PCP Verified by CM Yes   Last Visit to PCP Within last year   Prior Functional Level Independent in ADLs/IADLs   Current Functional Level Independent in ADLs/IADLs   Can patient return to prior living arrangement Yes   Ability to make needs known: Good   Family able to assist with home care needs: Yes   Condition of Participation: Discharge Planning   The Patient and/Or Patient Representative agree with the Discharge Plan? Yes     CM into see pt to initiate a safe discharge plan. Cm  introduced self and explained role of CM. Pt is kind, alert and oriented. Pt lives with his spouse in a one floor home with basement. Pt works full time. Pt is independent/ able to drive. Pt uses no DME. Pt has no insurance. FC  saw pt and he will be financial assistance. Does not qualify for Medicaid. CM discussed the ETOH use. CM offered inpt/ out pt options. Pt informed that he can not miss work but open to options for out pt. CM placed on the discharge instructions. CM offered Med Assist and he declined.   Discharge plan is for pt to return home with spouse. ETOH addiction resources placed on the discharge instructions. Pt has a PCP and transportation. CM offered Med Assist and he declined.   CM provided card and encouraged to call for any needs or concern.   CM is available if any needs arise.

## 2024-03-12 NOTE — PLAN OF CARE
Problem: Discharge Planning  Goal: Discharge to home or other facility with appropriate resources  Outcome: Progressing     Problem: Pain  Goal: Verbalizes/displays adequate comfort level or baseline comfort level  Outcome: Progressing     Problem: Nutrition Deficit:  Goal: Optimize nutritional status  Outcome: Progressing     Problem: Neurosensory - Adult  Goal: Absence of seizures  Outcome: Progressing  Goal: Remains free of injury related to seizures activity  Outcome: Progressing     Problem: Respiratory - Adult  Goal: Achieves optimal ventilation and oxygenation  Outcome: Progressing     Problem: Cardiovascular - Adult  Goal: Maintains optimal cardiac output and hemodynamic stability  Outcome: Progressing  Goal: Absence of cardiac dysrhythmias or at baseline  Outcome: Progressing     Problem: Gastrointestinal - Adult  Goal: Minimal or absence of nausea and vomiting  Outcome: Progressing  Goal: Maintains or returns to baseline bowel function  Outcome: Progressing  Goal: Maintains adequate nutritional intake  Outcome: Progressing

## 2024-03-12 NOTE — PROGRESS NOTES
V2.0    [facility] Progress Note      Name:  Harsha Liu /Age/Sex: 1970  (53 y.o. male)   MRN & CSN:  [id number] & [id number] Encounter Date/Time: 3/12/2024 7:35 PM EDT   Location:  [address] [place] [address] PCP: John Blue MD     Attending:Rea Anderson MD       Hospital Day: 2    Assessment and Recommendations   Harsha Liu is a 53 y.o. male who presents with Chest pain, rule out acute myocardial infarction      Plan:     Chest pain  Concave pattern of ST elevation in all precordial leads  Recent upper respiratory viral infection  Suspect pericarditis  -D-dimer level was elevated and CTA pulmonary with contrast was done which was negative for pulmonary embolism  -TTE with preserved EF without wall motion abnormalities  -Cardiology initiated naproxen 500 mg twice daily for possible pericarditis  -monitor on tele     Alcohol abuse with history of alcohol withdrawal requiring hospitalization  Epigastric abdominal pain, elevated LFTs, suspect alcoholic hepatitis  -not taking acamprosate  -drinks upto 4 beers a day, previously 1 pint of vodka  -last drink day before presentation  -reports that he has gone few days without drinking and has only reported very mild withdrawal symptoms  -CIWA protocol with scheduled Librium and as needed ativan only for now as patient does not wish to undergo phenobarbital taper  -Acute hepatitis panel, RUQ ultrasound  -Lipase  -Counseled extensively on alcohol cessation and rehab, patient verbalized understanding     Mild hypokalemia  -give potassium chloride 40 meq once     Suspect obstructive sleep apnea  -Patient was noted to have mild hypoxia when sleeping  -Apnea link positive  -Recommend outpatient sleep study     Anxiety/depression  -resume paroxetine 20 mg daily      Diet ADULT DIET; Regular; No Caffeine  ADULT ORAL NUTRITION SUPPLEMENT; Lunch, Dinner; Low Calorie/High Protein Oral Supplement   DVT Prophylaxis [x] Lovenox, []  Heparin, [] SCDs, [] Ambulation,  []

## 2024-03-12 NOTE — PROGRESS NOTES
Comprehensive Nutrition Assessment    Type and Reason for Visit:  Initial, Positive Nutrition Screen (wt loss, poor intake)    Nutrition Recommendations/Plan:   Continue current diet  Begin low aisha high protein oral nutrition supplement bid, between meals as needed     Malnutrition Assessment:  Malnutrition Status:  No malnutrition (03/12/24 1237)    Context:  Acute Illness       Nutrition Assessment:    Pt admitted with epigastric/chest pain. H/O alcohol abuse. He reports consuming greater than half of meals PTA as well as here. Reports about a 10 lb wt loss over the past several weeks. Per Epic history, 4% wt loss noted over the past 3 mos, is not clinically significant for malnutrition. No questions re diet for home at this time. Willing to trial oral supplements while here, will order bid. Continue to follow as moderate nutrition risk.    Nutrition Related Findings:    Glu 104   Wound Type: None       Current Nutrition Intake & Therapies:    Average Meal Intake: 26-50%  Average Supplements Intake: None Ordered  ADULT DIET; Regular; No Caffeine    Anthropometric Measures:  Height: 182.9 cm (6')  Ideal Body Weight (IBW): 178 lbs (81 kg)    Admission Body Weight: 113.3 kg (249 lb 12.5 oz)  Current Body Weight: 113.3 kg (249 lb 12.5 oz),   IBW.    Current BMI (kg/m2): 33.9  Usual Body Weight: 117.5 kg (259 lb) (12/12/23)  % Weight Change (Calculated): -3.6                    BMI Categories: Obese Class 1 (BMI 30.0-34.9)    Estimated Daily Nutrient Needs:  Energy Requirements Based On: Formula  Weight Used for Energy Requirements: Current  Energy (kcal/day): 2432 (MSJ)  Weight Used for Protein Requirements: Ideal  Protein (g/day): 81-97 (1-1.2 g/kg IBW)  Method Used for Fluid Requirements: 1 ml/kcal  Fluid (ml/day): 2400    Nutrition Diagnosis:   Predicted inadequate energy intake related to psychological cause or life stress, acute injury/trauma as evidenced by intake 26-50%, poor intake prior to

## 2024-03-13 LAB
ALBUMIN SERPL-MCNC: 2.9 GM/DL (ref 3.4–5)
ALP BLD-CCNC: 219 IU/L (ref 40–129)
ALT SERPL-CCNC: 195 U/L (ref 10–40)
ANION GAP SERPL CALCULATED.3IONS-SCNC: 10 MMOL/L (ref 7–16)
AST SERPL-CCNC: 359 IU/L (ref 15–37)
BASOPHILS ABSOLUTE: 0 K/CU MM
BASOPHILS RELATIVE PERCENT: 0.9 % (ref 0–1)
BILIRUB SERPL-MCNC: 3.7 MG/DL (ref 0–1)
BILIRUBIN DIRECT: 2.8 MG/DL (ref 0–0.3)
BILIRUBIN, INDIRECT: 0.9 MG/DL (ref 0–0.7)
BUN SERPL-MCNC: 11 MG/DL (ref 6–23)
CALCIUM SERPL-MCNC: 8.2 MG/DL (ref 8.3–10.6)
CHLORIDE BLD-SCNC: 104 MMOL/L (ref 99–110)
CO2: 26 MMOL/L (ref 21–32)
CREAT SERPL-MCNC: 0.8 MG/DL (ref 0.9–1.3)
CULTURE: NORMAL
DIFFERENTIAL TYPE: ABNORMAL
EOSINOPHILS ABSOLUTE: 0.2 K/CU MM
EOSINOPHILS RELATIVE PERCENT: 4.6 % (ref 0–3)
GFR SERPL CREATININE-BSD FRML MDRD: >60 ML/MIN/1.73M2
GLUCOSE SERPL-MCNC: 131 MG/DL (ref 70–99)
HCT VFR BLD CALC: 44.6 % (ref 42–52)
HEMOGLOBIN: 15.1 GM/DL (ref 13.5–18)
IMMATURE NEUTROPHIL %: 0 % (ref 0–0.43)
INR BLD: 1.1 INDEX
LIPASE: 64 IU/L (ref 13–60)
LYMPHOCYTES ABSOLUTE: 1 K/CU MM
LYMPHOCYTES RELATIVE PERCENT: 31.3 % (ref 24–44)
Lab: NORMAL
MAGNESIUM: 2.1 MG/DL (ref 1.8–2.4)
MCH RBC QN AUTO: 28.2 PG (ref 27–31)
MCHC RBC AUTO-ENTMCNC: 33.9 % (ref 32–36)
MCV RBC AUTO: 83.4 FL (ref 78–100)
MONOCYTES ABSOLUTE: 0.3 K/CU MM
MONOCYTES RELATIVE PERCENT: 9.8 % (ref 0–4)
NUCLEATED RBC %: 0 %
PDW BLD-RTO: 17.1 % (ref 11.7–14.9)
PHOSPHORUS: 2.3 MG/DL (ref 2.5–4.9)
PLATELET # BLD: 34 K/CU MM (ref 140–440)
PMV BLD AUTO: 11.3 FL (ref 7.5–11.1)
POTASSIUM SERPL-SCNC: 3.9 MMOL/L (ref 3.5–5.1)
PROTHROMBIN TIME: 14.1 SECONDS (ref 11.7–14.5)
RBC # BLD: 5.35 M/CU MM (ref 4.6–6.2)
SEGMENTED NEUTROPHILS ABSOLUTE COUNT: 1.7 K/CU MM
SEGMENTED NEUTROPHILS RELATIVE PERCENT: 53.4 % (ref 36–66)
SODIUM BLD-SCNC: 140 MMOL/L (ref 135–145)
SPECIMEN: NORMAL
STREP A DIRECT SCREEN: NEGATIVE
TOTAL IMMATURE NEUTOROPHIL: 0 K/CU MM
TOTAL NUCLEATED RBC: 0 K/CU MM
TOTAL PROTEIN: 5.5 GM/DL (ref 6.4–8.2)
WBC # BLD: 3.3 K/CU MM (ref 4–10.5)

## 2024-03-13 PROCEDURE — APPNB180 APP NON BILLABLE TIME > 60 MINS: Performed by: LICENSED PRACTICAL NURSE

## 2024-03-13 PROCEDURE — 6360000002 HC RX W HCPCS: Performed by: INTERNAL MEDICINE

## 2024-03-13 PROCEDURE — 36415 COLL VENOUS BLD VENIPUNCTURE: CPT

## 2024-03-13 PROCEDURE — 84100 ASSAY OF PHOSPHORUS: CPT

## 2024-03-13 PROCEDURE — 6370000000 HC RX 637 (ALT 250 FOR IP): Performed by: STUDENT IN AN ORGANIZED HEALTH CARE EDUCATION/TRAINING PROGRAM

## 2024-03-13 PROCEDURE — 1200000000 HC SEMI PRIVATE

## 2024-03-13 PROCEDURE — 2580000003 HC RX 258: Performed by: LICENSED PRACTICAL NURSE

## 2024-03-13 PROCEDURE — 83735 ASSAY OF MAGNESIUM: CPT

## 2024-03-13 PROCEDURE — 85025 COMPLETE CBC W/AUTO DIFF WBC: CPT

## 2024-03-13 PROCEDURE — 6370000000 HC RX 637 (ALT 250 FOR IP): Performed by: INTERNAL MEDICINE

## 2024-03-13 PROCEDURE — 82248 BILIRUBIN DIRECT: CPT

## 2024-03-13 PROCEDURE — 94761 N-INVAS EAR/PLS OXIMETRY MLT: CPT

## 2024-03-13 PROCEDURE — 85610 PROTHROMBIN TIME: CPT

## 2024-03-13 PROCEDURE — 83690 ASSAY OF LIPASE: CPT

## 2024-03-13 PROCEDURE — 2580000003 HC RX 258: Performed by: INTERNAL MEDICINE

## 2024-03-13 PROCEDURE — 80053 COMPREHEN METABOLIC PANEL: CPT

## 2024-03-13 RX ORDER — SODIUM CHLORIDE 9 MG/ML
INJECTION, SOLUTION INTRAVENOUS CONTINUOUS
Status: DISCONTINUED | OUTPATIENT
Start: 2024-03-13 | End: 2024-03-15 | Stop reason: HOSPADM

## 2024-03-13 RX ORDER — CHLORDIAZEPOXIDE HYDROCHLORIDE 25 MG/1
25 CAPSULE, GELATIN COATED ORAL EVERY 12 HOURS
Status: DISCONTINUED | OUTPATIENT
Start: 2024-03-14 | End: 2024-03-14

## 2024-03-13 RX ADMIN — CHLORDIAZEPOXIDE HYDROCHLORIDE 25 MG: 25 CAPSULE ORAL at 10:36

## 2024-03-13 RX ADMIN — CHLORDIAZEPOXIDE HYDROCHLORIDE 25 MG: 25 CAPSULE ORAL at 16:53

## 2024-03-13 RX ADMIN — SODIUM CHLORIDE: 9 INJECTION, SOLUTION INTRAVENOUS at 18:07

## 2024-03-13 RX ADMIN — SODIUM CHLORIDE, PRESERVATIVE FREE 10 ML: 5 INJECTION INTRAVENOUS at 10:36

## 2024-03-13 RX ADMIN — ENOXAPARIN SODIUM 40 MG: 100 INJECTION SUBCUTANEOUS at 10:36

## 2024-03-13 RX ADMIN — ASPIRIN 81 MG: 81 TABLET, CHEWABLE ORAL at 10:36

## 2024-03-13 RX ADMIN — NADOLOL 20 MG: 20 TABLET ORAL at 10:36

## 2024-03-13 RX ADMIN — ATORVASTATIN CALCIUM 40 MG: 40 TABLET, FILM COATED ORAL at 21:03

## 2024-03-13 RX ADMIN — PAROXETINE 30 MG: 10 TABLET, FILM COATED ORAL at 10:36

## 2024-03-13 RX ADMIN — PANTOPRAZOLE SODIUM 40 MG: 40 TABLET, DELAYED RELEASE ORAL at 06:36

## 2024-03-13 RX ADMIN — Medication 500 MG: at 06:36

## 2024-03-13 RX ADMIN — PANTOPRAZOLE SODIUM 40 MG: 40 TABLET, DELAYED RELEASE ORAL at 16:53

## 2024-03-13 RX ADMIN — THIAMINE HYDROCHLORIDE 200 MG: 100 INJECTION, SOLUTION INTRAMUSCULAR; INTRAVENOUS at 10:35

## 2024-03-13 ASSESSMENT — PAIN DESCRIPTION - DESCRIPTORS: DESCRIPTORS: ACHING

## 2024-03-13 ASSESSMENT — PAIN SCALES - GENERAL: PAINLEVEL_OUTOF10: 5

## 2024-03-13 ASSESSMENT — PAIN DESCRIPTION - LOCATION: LOCATION: GENERALIZED

## 2024-03-13 NOTE — PROGRESS NOTES
V2.0    [facility] Progress Note      Name:  Harsha Liu /Age/Sex: 1970  (53 y.o. male)   MRN & CSN:  [id number] & [id number] Encounter Date/Time: 3/13/2024 7:35 PM EDT   Location:  [address] [place] [address] PCP: John Blue MD     Attending:Rea Anderson MD       Hospital Day: 3    Assessment and Recommendations   Harsha Liu is a 53 y.o. male who presents with Chest pain, rule out acute myocardial infarction      Plan:     Chest pain  Concave pattern of ST elevation in all precordial leads  Recent upper respiratory viral infection  Suspect pericarditis  -D-dimer level was elevated and CTA pulmonary with contrast was done which was negative for pulmonary embolism  -TTE with preserved EF without wall motion abnormalities  -Cardiology initiated naproxen 500 mg twice daily for possible pericarditis  -monitor on tele     Alcohol abuse with history of alcohol withdrawal requiring hospitalization  Epigastric abdominal pain, elevated LFTs, suspect alcoholic hepatitis  History of liver cirrhosis -seen on CT scan 2023 at [place]  -not taking acamprosate  -drinks upto 4 beers a day, previously 1 pint of vodka  -last drink day before presentation  -reports that he has gone few days without drinking and has only reported very mild withdrawal symptoms  -CIWA protocol with scheduled Librium and as needed ativan only for now as patient does not wish to undergo phenobarbital taper  -Lipase 64.  Acute hepatitis panel negative, RUQ ultrasound with moderately diffuse increased echogenicity throughout the liver and mild hepatic enlargement.  -Counseled extensively on alcohol cessation and rehab, patient verbalized understanding  -Continue nadolol  -GI consulted     Mild hypokalemia  -give potassium chloride 40 meq once     Suspect obstructive sleep apnea  -Patient was noted to have mild hypoxia when sleeping  -Apnea link positive  -Recommend outpatient sleep study     Anxiety/depression  -resume paroxetine 20 mg  input(s): \"PROBNP\" in the last 72 hours.  UA:  Lab Results   Component Value Date/Time    NITRU NEGATIVE 11/15/2021 11:00 PM    COLORU DARK YELLOW 11/15/2021 11:00 PM    WBCUA NO CELLS SEEN 11/15/2021 11:00 PM    RBCUA NO CELLS SEEN 11/15/2021 11:00 PM    BACTERIA RARE 11/15/2021 11:00 PM    CLARITYU CLEAR 11/15/2021 11:00 PM    SPECGRAV 1.015 11/15/2021 11:00 PM    LEUKOCYTESUR NEGATIVE 11/15/2021 11:00 PM    UROBILINOGEN 0.2 11/15/2021 11:00 PM    BILIRUBINUR NEGATIVE 11/15/2021 11:00 PM    BLOODU NEGATIVE 11/15/2021 11:00 PM    KETUA NEGATIVE 11/15/2021 11:00 PM     Urine Cultures: No results found for: \"LABURIN\"  Blood Cultures: No results found for: \"BC\"  No results found for: \"BLOODCULT2\"  Organism: No results found for: \"ORG\"      Electronically signed by Rea Anderson MD on 3/13/2024 at 2:55 PM

## 2024-03-13 NOTE — CONSULTS
tablet 1 mg  1 mg Oral Q1H PRN Robin Sommers MD        Or    LORazepam (ATIVAN) tablet 0.5 mg  0.5 mg Oral Q1H PRN Robin Sommers MD        Or    LORazepam (ATIVAN) tablet 2 mg  2 mg Oral Q1H PRN Robin Sommers MD        Or    LORazepam (ATIVAN) tablet 3 mg  3 mg Oral Q1H PRN Robin Sommers MD        Or    LORazepam (ATIVAN) tablet 4 mg  4 mg Oral Q1H PRN Robin Sommers MD        naproxen (NAPROSYN) tablet 500 mg  500 mg Oral BID WC Audrey Disla MD   500 mg at 24    pantoprazole (PROTONIX) tablet 40 mg  40 mg Oral BID AC Audrey Disla MD   40 mg at 24       Past Medical History:        Diagnosis Date    Anxiety     Hernia, inguinal, left        Past Surgical History:        Procedure Laterality Date    CYST REMOVAL Left     LEFT ARM    HERNIA REPAIR      left inguinal hernia- Dr. Vanegas        Social History:    Social History     Socioeconomic History    Marital status:      Spouse name: Not on file    Number of children: Not on file    Years of education: Not on file    Highest education level: Not on file   Occupational History    Not on file   Tobacco Use    Smoking status: Former     Current packs/day: 0.00     Average packs/day: 0.5 packs/day for 18.0 years (9.0 ttl pk-yrs)     Types: Cigarettes     Start date:      Quit date: 2006     Years since quittin.2    Smokeless tobacco: Never   Substance and Sexual Activity    Alcohol use: Yes     Comment: few beers a week     Drug use: Never    Sexual activity: Yes     Partners: Female     Comment:    Other Topics Concern    Not on file   Social History Narrative    Not on file     Social Determinants of Health     Financial Resource Strain: Not on file   Food Insecurity: No Food Insecurity (3/11/2024)    Hunger Vital Sign     Worried About Running Out of Food in the Last Year: Never true     Ran Out of Food in the Last Year: Never true   Transportation Needs: No Transportation Needs (3/11/2024)    PRAPARE -  occurring between March 2023 to December 2023.  -last drink 1 day prior to admission     4)Suspected obstructive sleep apnea, was noted to have mild hypoxia when sleeping-will defer to hospital medicine appreciate recommendations  -Planning to over go sleep apnea evaluation    5) History of alcohol withdrawal requiring admission    6) Typical Chest pain (resolved/ improved), cardiology following and recommend conservative treatment-will defer to cardiology and appreciate recommendations  -EKG showed sinus rhythm with concave ST segments, echo shows ejection fraction 55% with normal wall motion, CTA chest no acute disease    Patient's history, exam, and plan of care were discussed with the patient and Dr. Brandt . All questions and concerns were discussed with the patient. Patient agreed to plan.      Electronically signed by ELIEL Judge CNP on 3/13/2024 at 11:00 AM

## 2024-03-14 VITALS
OXYGEN SATURATION: 95 % | HEART RATE: 77 BPM | WEIGHT: 257 LBS | BODY MASS INDEX: 34.81 KG/M2 | TEMPERATURE: 99 F | RESPIRATION RATE: 21 BRPM | SYSTOLIC BLOOD PRESSURE: 144 MMHG | HEIGHT: 72 IN | DIASTOLIC BLOOD PRESSURE: 89 MMHG

## 2024-03-14 LAB
ALBUMIN SERPL-MCNC: 2.9 GM/DL (ref 3.4–5)
ALBUMIN SERPL-MCNC: 3.2 GM/DL (ref 3.4–5)
ALP BLD-CCNC: 196 IU/L (ref 40–128)
ALP BLD-CCNC: 205 IU/L (ref 40–129)
ALT SERPL-CCNC: 152 U/L (ref 10–40)
ALT SERPL-CCNC: 164 U/L (ref 10–40)
ANION GAP SERPL CALCULATED.3IONS-SCNC: 7 MMOL/L (ref 7–16)
ANION GAP SERPL CALCULATED.3IONS-SCNC: 7 MMOL/L (ref 7–16)
ANISOCYTOSIS: ABNORMAL
AST SERPL-CCNC: 259 IU/L (ref 15–37)
AST SERPL-CCNC: 301 IU/L (ref 15–37)
BANDED NEUTROPHILS ABSOLUTE COUNT: 0.05 K/CU MM
BANDED NEUTROPHILS RELATIVE PERCENT: 1 % (ref 5–11)
BASOPHILS ABSOLUTE: 0.1 K/CU MM
BASOPHILS RELATIVE PERCENT: 1 % (ref 0–1)
BILIRUB SERPL-MCNC: 2.5 MG/DL (ref 0–1)
BILIRUB SERPL-MCNC: 2.7 MG/DL (ref 0–1)
BILIRUBIN DIRECT: 2 MG/DL (ref 0–0.3)
BILIRUBIN, INDIRECT: 0.7 MG/DL (ref 0–0.7)
BUN SERPL-MCNC: 10 MG/DL (ref 6–23)
BUN SERPL-MCNC: 8 MG/DL (ref 6–23)
CALCIUM SERPL-MCNC: 8.7 MG/DL (ref 8.3–10.6)
CALCIUM SERPL-MCNC: 8.9 MG/DL (ref 8.3–10.6)
CHLORIDE BLD-SCNC: 105 MMOL/L (ref 99–110)
CHLORIDE BLD-SCNC: 106 MMOL/L (ref 99–110)
CO2: 28 MMOL/L (ref 21–32)
CO2: 28 MMOL/L (ref 21–32)
CREAT SERPL-MCNC: 0.9 MG/DL (ref 0.9–1.3)
CREAT SERPL-MCNC: 0.9 MG/DL (ref 0.9–1.3)
DIFFERENTIAL TYPE: ABNORMAL
DIFFERENTIAL TYPE: ABNORMAL
EOSINOPHILS ABSOLUTE: 0.1 K/CU MM
EOSINOPHILS ABSOLUTE: 0.2 K/CU MM
EOSINOPHILS RELATIVE PERCENT: 2.9 % (ref 0–3)
EOSINOPHILS RELATIVE PERCENT: 4 % (ref 0–3)
GFR SERPL CREATININE-BSD FRML MDRD: >60 ML/MIN/1.73M2
GFR SERPL CREATININE-BSD FRML MDRD: >60 ML/MIN/1.73M2
GLUCOSE SERPL-MCNC: 120 MG/DL (ref 70–99)
GLUCOSE SERPL-MCNC: 158 MG/DL (ref 70–99)
HCT VFR BLD CALC: 43.3 % (ref 42–52)
HCT VFR BLD CALC: 44.2 % (ref 42–52)
HEMOGLOBIN: 14.3 GM/DL (ref 13.5–18)
HEMOGLOBIN: 14.6 GM/DL (ref 13.5–18)
IMMATURE NEUTROPHIL %: 0.6 % (ref 0–0.43)
LYMPHOCYTES ABSOLUTE: 1.5 K/CU MM
LYMPHOCYTES ABSOLUTE: 1.7 K/CU MM
LYMPHOCYTES RELATIVE PERCENT: 30.3 % (ref 24–44)
LYMPHOCYTES RELATIVE PERCENT: 37 % (ref 24–44)
MAGNESIUM: 2 MG/DL (ref 1.8–2.4)
MCH RBC QN AUTO: 27.8 PG (ref 27–31)
MCH RBC QN AUTO: 28.6 PG (ref 27–31)
MCHC RBC AUTO-ENTMCNC: 32.4 % (ref 32–36)
MCHC RBC AUTO-ENTMCNC: 33.7 % (ref 32–36)
MCV RBC AUTO: 84.9 FL (ref 78–100)
MCV RBC AUTO: 86 FL (ref 78–100)
MONOCYTES ABSOLUTE: 0.4 K/CU MM
MONOCYTES ABSOLUTE: 0.6 K/CU MM
MONOCYTES RELATIVE PERCENT: 12 % (ref 0–4)
MONOCYTES RELATIVE PERCENT: 8 % (ref 0–4)
NUCLEATED RBC %: 0 %
NUCLEATED RBC %: 0 %
PDW BLD-RTO: 17.7 % (ref 11.7–14.9)
PDW BLD-RTO: 18.3 % (ref 11.7–14.9)
PHOSPHORUS: 2.6 MG/DL (ref 2.5–4.9)
PLATELET # BLD: 31 K/CU MM (ref 140–440)
PLATELET # BLD: 38 K/CU MM (ref 140–440)
POTASSIUM SERPL-SCNC: 4.2 MMOL/L (ref 3.5–5.1)
POTASSIUM SERPL-SCNC: 4.5 MMOL/L (ref 3.5–5.1)
RBC # BLD: 5.1 M/CU MM (ref 4.6–6.2)
RBC # BLD: 5.14 M/CU MM (ref 4.6–6.2)
RBC # BLD: ABNORMAL 10*6/UL
SEGMENTED NEUTROPHILS ABSOLUTE COUNT: 2.1 K/CU MM
SEGMENTED NEUTROPHILS ABSOLUTE COUNT: 2.6 K/CU MM
SEGMENTED NEUTROPHILS RELATIVE PERCENT: 50 % (ref 36–66)
SEGMENTED NEUTROPHILS RELATIVE PERCENT: 53.2 % (ref 36–66)
SODIUM BLD-SCNC: 140 MMOL/L (ref 135–145)
SODIUM BLD-SCNC: 141 MMOL/L (ref 135–145)
TOTAL IMMATURE NEUTOROPHIL: 0.03 K/CU MM
TOTAL NUCLEATED RBC: 0 K/CU MM
TOTAL NUCLEATED RBC: 0 K/CU MM
TOTAL PROTEIN: 5.7 GM/DL (ref 6.4–8.2)
TOTAL PROTEIN: 5.8 GM/DL (ref 6.4–8.2)
WBC # BLD: 4.5 K/CU MM (ref 4–10.5)
WBC # BLD: 4.9 K/CU MM (ref 4–10.5)

## 2024-03-14 PROCEDURE — 80053 COMPREHEN METABOLIC PANEL: CPT

## 2024-03-14 PROCEDURE — 6370000000 HC RX 637 (ALT 250 FOR IP): Performed by: INTERNAL MEDICINE

## 2024-03-14 PROCEDURE — 82248 BILIRUBIN DIRECT: CPT

## 2024-03-14 PROCEDURE — 6370000000 HC RX 637 (ALT 250 FOR IP): Performed by: STUDENT IN AN ORGANIZED HEALTH CARE EDUCATION/TRAINING PROGRAM

## 2024-03-14 PROCEDURE — 1200000000 HC SEMI PRIVATE

## 2024-03-14 PROCEDURE — 36415 COLL VENOUS BLD VENIPUNCTURE: CPT

## 2024-03-14 PROCEDURE — 83735 ASSAY OF MAGNESIUM: CPT

## 2024-03-14 PROCEDURE — 84100 ASSAY OF PHOSPHORUS: CPT

## 2024-03-14 PROCEDURE — APPNB45 APP NON BILLABLE 31-45 MINUTES: Performed by: LICENSED PRACTICAL NURSE

## 2024-03-14 PROCEDURE — 2580000003 HC RX 258: Performed by: INTERNAL MEDICINE

## 2024-03-14 PROCEDURE — 85027 COMPLETE CBC AUTOMATED: CPT

## 2024-03-14 PROCEDURE — 85025 COMPLETE CBC W/AUTO DIFF WBC: CPT

## 2024-03-14 PROCEDURE — 85007 BL SMEAR W/DIFF WBC COUNT: CPT

## 2024-03-14 RX ORDER — LANOLIN ALCOHOL/MO/W.PET/CERES
100 CREAM (GRAM) TOPICAL DAILY
Qty: 30 TABLET | Refills: 3 | Status: SHIPPED | OUTPATIENT
Start: 2024-03-15

## 2024-03-14 RX ORDER — ATORVASTATIN CALCIUM 40 MG/1
40 TABLET, FILM COATED ORAL NIGHTLY
Qty: 30 TABLET | Refills: 0 | Status: SHIPPED | OUTPATIENT
Start: 2024-03-14

## 2024-03-14 RX ADMIN — SODIUM CHLORIDE, PRESERVATIVE FREE 10 ML: 5 INJECTION INTRAVENOUS at 10:32

## 2024-03-14 RX ADMIN — Medication 100 MG: at 10:31

## 2024-03-14 RX ADMIN — CHLORDIAZEPOXIDE HYDROCHLORIDE 25 MG: 25 CAPSULE ORAL at 05:08

## 2024-03-14 RX ADMIN — PANTOPRAZOLE SODIUM 40 MG: 40 TABLET, DELAYED RELEASE ORAL at 05:09

## 2024-03-14 RX ADMIN — NADOLOL 20 MG: 20 TABLET ORAL at 10:32

## 2024-03-14 RX ADMIN — PAROXETINE 30 MG: 10 TABLET, FILM COATED ORAL at 10:31

## 2024-03-14 RX ADMIN — PANTOPRAZOLE SODIUM 40 MG: 40 TABLET, DELAYED RELEASE ORAL at 17:16

## 2024-03-14 RX ADMIN — ATORVASTATIN CALCIUM 40 MG: 40 TABLET, FILM COATED ORAL at 20:38

## 2024-03-14 ASSESSMENT — PAIN SCALES - GENERAL: PAINLEVEL_OUTOF10: 0

## 2024-03-14 NOTE — PROGRESS NOTES
03/14/24 1135   Encounter Summary   Encounter Overview/Reason  Initial Encounter   Service Provided For: Patient and family together   Referral/Consult From: [facility]   Support System Spouse   Last Encounter  03/14/24  (PT out of his bed and in his chair, he expressed feeling much better, he appreciates his caregivers, calm and coping well, prayers offered.)   Complexity of Encounter Low   Begin Time 1123   End Time  1137   Total Time Calculated 14 min   Spiritual/Emotional needs   Type Spiritual Support   Grief, Loss, and Adjustments   Type Adjustment to illness   Assessment/Intervention/Outcome   Assessment Calm;Coping;Hopeful   Intervention Active listening;Discussed illness injury and it’s impact;Explored/Affirmed feelings, thoughts, concerns;Nurtured Hope;Sustaining Presence/Ministry of presence   Outcome Acceptance;Comfort;Connection/Belonging;Coping;Encouraged;Expressed Gratitude   Plan and Referrals   Plan/Referrals Continue Support (comment)

## 2024-03-14 NOTE — CONSULTS
Nutrition Education    Educated on low sodium intake, low fat intake, high protein intake. Reviewed nutrition reading labels. Discussed reducing alcohol intake and foods that may cause alcohol cravings. Pt is willing to cook and make food at home. Refer pt to GI PCP for follow up.   Learners: Patient  Readiness: Acceptance  Method: Explanation, Handout, and Teachback  Response: Verbalizes Understanding  Contact name and number provided.    Mary Ellen Waite RD, LD  Contact Number: [phone]

## 2024-03-14 NOTE — PROGRESS NOTES
[facility]             MD Polo Page MD Carol Christensen, APRN-CNP       Love Tapia, APRN-CNP             [address] [place] [address]             [phone] fax [phone]      Gastroenterology Progress note . 3/14/2024  Reason for consult:   Transaminitis, epigastric pain    Physician attestation:     Interval H/P    Today's LFTs note some improvement with alkaline phosphatase being 205, , , total bilirubin 2.7 with direct being 2.0 and indirect being 0.7.    Patient examined at bedside. Wife on phone. Discussed plan of care at discharge, Needing to follow up with us as an outpatient with labs. Verbalized understanding     CC today:no new complaints     GI symptoms: denies dysphagia, nausea, vomiting, heartburn/reflux, constipation, diarrhea, melena and hematochezia.     ROS: Per history of present illness. All other systems were reviewed and were negative.      Physical Exam  Blood pressure 118/66, pulse 68, temperature 98.4 °F (36.9 °C), temperature source Oral, resp. rate 18, height 1.829 m (6'), weight 116.6 kg (257 lb), SpO2 97 %.  Constitutional: Patient is in no distress.   Eyes: Pupils equal, round.  No icterus.  HENT: Oral mucosa is moist.   Pulmonary: No accessory muscle use. No localizing pulmonary findings.     Cardiovascular: Heart has a regular rate and rhythm, no JVD.   Gastrointestinal: Bowel sounds are present in all four quadrants. Abdomen is soft, nontender, nondistended. Rectal examination deferred.   Skin: No jaundice, spider angiomas, or palmar erythema. No purpura.  Neuro: Awake,alert, and oriented x3. No gross focal neurologic deficits.       Chart and labs reviewed.     IMPRESSION/RECOMMENDATIONS:  1) elevated LFTs, (improving) - Alk phos 205, ,   -yesterday alkaline phosphatase 219, , , with a total bilirubin of 3.7, direct bilirubin 2.8 and  direct bilirubin 0.9- felt to be alcoholic hepatitis, other differentials include choledocholithiasis however ultrasound was negative for biliary dilation, with a normal gallbladder seen  -Alcohol cessation advised  -trend LFTs  -Maddrey score <1.9 will defer steroids at this time.  -again will need close follow up with our office in 2-4 weeks. Office notified.      2) decompensated Acholic Cirrhosis MELD 3.0 14/12, as seen on CT from [facility], Etoh use, gastroesophageal varices, suggestive portal hypertension  - Us shows:  Moderately diffuse increased echogenicity throughout the liver and mild hepatic enlargement. Likely secondary to hepatocellular disease or fatty infiltration without focal mass or ductal dilation. Normal-appearing gallbladder. Normal sonogram of the right kidney and retroperitoneum.  -Will need EGD as an outpatient  -Will need close follow-up with our office for ongoing care  -Alcohol cessation advised resources given      CIRRHOSIS DATABASE:  A.  Varices surveillance/prophylaxis- needs EGD  B.  Ascites- none present  C.  Hepatocellular carcinoma screening- negative for mass on ultrasound this admission   D.  Vaccination- recommend vaccination against Hep A and B  E.  Hepatic encephalopathy- none present  F.  Spontaneous bacterial peritonitis- no hx of this  G.  Patient counseled on ETOH and tobacco avoidance.  H.  Tylenol use up to 2 grams/day ok.  I.    Patient counseled on NSAID medication avoidance.  J.   Patient counseled on avoidance of hepatotoxic medications.  Correct coagulopathy if bleeding OR for procedures: no indication for correction at present time                     MELD 3.0: 14 at 3/13/2024  2:57 AM  MELD-Na: 12 at 3/13/2024  2:57 AM  Calculated from:  Serum Creatinine: 0.8 MG/DL (Using min of 1 MG/DL) at 3/13/2024  2:57 AM  Serum Sodium: 140 MMOL/L (Using max of 137 MMOL/L) at 3/13/2024  2:57 AM  Total Bilirubin: 3.7 MG/DL at 3/13/2024  2:57 AM  Serum Albumin: 2.9 GM/DL at 3/13/2024

## 2024-03-14 NOTE — CARE COORDINATION
Chart reviewed, screened for discharge planning.  Plan remains home with spouse. No discharge needs identified at this time.      Substance abuse resources provided.     CM following for needs

## 2024-03-15 LAB — SMEAR REVIEW: NORMAL

## 2024-03-15 NOTE — PROGRESS NOTES
Physician Progress Note      PATIENT:               DAY PEREZ  CSN #:                  [id number]  :                       1970  ADMIT DATE:       3/11/2024 7:14 AM  DISCH DATE:        3/15/2024 12:21 AM  RESPONDING  PROVIDER #:        Rea Anderson MD          QUERY TEXT:    Pt admitted with Chest pain.  Pt noted to have possible pericarditis. If   possible, please document in progress notes and discharge summary if you are   evaluating and/or treating any of the following:    The medical record reflects the following:  Risk Factors:  Clinical Indicators: Pt reports epigastric abdominal pain radiating to   chest.Chest pain Concave pattern of ST elevation in all precordial leads   Recent upper respiratory viral infection Suspect pericarditis, -EKG showing   concave ST segments.  -Echo showing preserved EF without wall motion abnormalities.  Low likelihood   of CAD-CTA pulm negative for PE, Alcohol withdrawal-Could be contributing to   epigastric pain  Treatment: Obtaining abdominal ultrasound to evaluate elevated LFTs, On   Protonix 40 mg twice daily-Initiated on naproxen 500 mg twice a day, CARDS   consult, Labs    Thank you, Opal Weiss RN, [facility] [phone]  Options provided:  -- Chest pain due to pericarditis  -- Chest pain due to peptic ulcer disease  -- Chest pain due to Alcohol withdrawal  -- Chest pain due to other, please specify.  -- Other - I will add my own diagnosis  -- Disagree - Not applicable / Not valid  -- Disagree - Clinically unable to determine / Unknown  -- Refer to Clinical Documentation Reviewer    PROVIDER RESPONSE TEXT:    Chest pain likely due to alcohol use and withdrawal    Query created by: Opal Weiss on 3/14/2024 2:59 PM      Electronically signed by:  Rea Anderson MD 3/15/2024 11:38 AM

## 2024-03-19 ENCOUNTER — TELEPHONE (OUTPATIENT)
Dept: GASTROENTEROLOGY | Age: 54
End: 2024-03-19

## 2024-03-21 ENCOUNTER — OFFICE VISIT (OUTPATIENT)
Dept: FAMILY MEDICINE CLINIC | Age: 54
End: 2024-03-21

## 2024-03-21 VITALS
BODY MASS INDEX: 34.54 KG/M2 | DIASTOLIC BLOOD PRESSURE: 84 MMHG | HEART RATE: 74 BPM | HEIGHT: 72 IN | OXYGEN SATURATION: 96 % | SYSTOLIC BLOOD PRESSURE: 136 MMHG | WEIGHT: 255 LBS

## 2024-03-21 DIAGNOSIS — F10.11 HISTORY OF ETOH ABUSE: ICD-10-CM

## 2024-03-21 DIAGNOSIS — R79.89 ELEVATED LFTS: Primary | ICD-10-CM

## 2024-03-21 DIAGNOSIS — F34.1 DYSTHYMIA: ICD-10-CM

## 2024-03-21 PROCEDURE — 99214 OFFICE O/P EST MOD 30 MIN: CPT | Performed by: INTERNAL MEDICINE

## 2024-03-21 SDOH — ECONOMIC STABILITY: HOUSING INSECURITY
IN THE LAST 12 MONTHS, WAS THERE A TIME WHEN YOU DID NOT HAVE A STEADY PLACE TO SLEEP OR SLEPT IN A SHELTER (INCLUDING NOW)?: NO

## 2024-03-21 SDOH — ECONOMIC STABILITY: FOOD INSECURITY: WITHIN THE PAST 12 MONTHS, THE FOOD YOU BOUGHT JUST DIDN'T LAST AND YOU DIDN'T HAVE MONEY TO GET MORE.: NEVER TRUE

## 2024-03-21 SDOH — ECONOMIC STABILITY: FOOD INSECURITY: WITHIN THE PAST 12 MONTHS, YOU WORRIED THAT YOUR FOOD WOULD RUN OUT BEFORE YOU GOT MONEY TO BUY MORE.: NEVER TRUE

## 2024-03-21 SDOH — ECONOMIC STABILITY: INCOME INSECURITY: HOW HARD IS IT FOR YOU TO PAY FOR THE VERY BASICS LIKE FOOD, HOUSING, MEDICAL CARE, AND HEATING?: NOT HARD AT ALL

## 2024-03-21 ASSESSMENT — PATIENT HEALTH QUESTIONNAIRE - PHQ9
10. IF YOU CHECKED OFF ANY PROBLEMS, HOW DIFFICULT HAVE THESE PROBLEMS MADE IT FOR YOU TO DO YOUR WORK, TAKE CARE OF THINGS AT HOME, OR GET ALONG WITH OTHER PEOPLE: SOMEWHAT DIFFICULT
1. LITTLE INTEREST OR PLEASURE IN DOING THINGS: NOT AT ALL
3. TROUBLE FALLING OR STAYING ASLEEP: NOT AT ALL
6. FEELING BAD ABOUT YOURSELF - OR THAT YOU ARE A FAILURE OR HAVE LET YOURSELF OR YOUR FAMILY DOWN: SEVERAL DAYS
8. MOVING OR SPEAKING SO SLOWLY THAT OTHER PEOPLE COULD HAVE NOTICED. OR THE OPPOSITE, BEING SO FIGETY OR RESTLESS THAT YOU HAVE BEEN MOVING AROUND A LOT MORE THAN USUAL: NOT AT ALL
SUM OF ALL RESPONSES TO PHQ9 QUESTIONS 1 & 2: 0
7. TROUBLE CONCENTRATING ON THINGS, SUCH AS READING THE NEWSPAPER OR WATCHING TELEVISION: NOT AT ALL
2. FEELING DOWN, DEPRESSED OR HOPELESS: NOT AT ALL
SUM OF ALL RESPONSES TO PHQ QUESTIONS 1-9: 1
SUM OF ALL RESPONSES TO PHQ QUESTIONS 1-9: 1
4. FEELING TIRED OR HAVING LITTLE ENERGY: NOT AT ALL
SUM OF ALL RESPONSES TO PHQ QUESTIONS 1-9: 1
SUM OF ALL RESPONSES TO PHQ QUESTIONS 1-9: 1

## 2024-03-21 NOTE — PROGRESS NOTES
Eyes: Pupils are equal, round, and reactive to light. Conjunctivae and EOM are normal.   Neck: Normal range of motion. Neck supple. No JVD present.   Cardiovascular: Normal rate, regular rhythm, normal heart sounds and intact distal pulses.   Pulmonary/Chest: Effort normal and breath sounds normal.   Abdominal: Soft. Bowel sounds are normal. He exhibits no distension. There is no tenderness.   Musculoskeletal: Normal range of motion.   Neurological: He is alert and oriented to person, place, and time.   Skin: Skin is warm and dry. Capillary refill takes less than 2 seconds.   Psychiatric: He has a normal mood and affect.  His behavior is normal. Judgment and thought content normal.     Assessment & Plan     Diagnosis Orders   1. Elevated LFTs        2. Dysthymia        3. History of ETOH abuse            Chest  pain, elevated LFT, hx etoh abuse.  Will recheck labs with CBC and LFT, he is going to compunet for cost.  He would like to be immunized for hep A and B.  He will be seeing dr Marks for GI.  He will be going to AA.  He is taking the campral.     Return in about 2 months (around 5/21/2024).     John Blue MD, 3/21/2024 5:01 PM

## 2024-04-03 ENCOUNTER — OFFICE VISIT (OUTPATIENT)
Dept: GASTROENTEROLOGY | Age: 54
End: 2024-04-03

## 2024-04-03 VITALS
WEIGHT: 256.6 LBS | SYSTOLIC BLOOD PRESSURE: 120 MMHG | TEMPERATURE: 98.2 F | HEIGHT: 72 IN | DIASTOLIC BLOOD PRESSURE: 62 MMHG | BODY MASS INDEX: 34.75 KG/M2 | OXYGEN SATURATION: 93 % | HEART RATE: 73 BPM

## 2024-04-03 DIAGNOSIS — R79.89 ELEVATED LFTS: Primary | ICD-10-CM

## 2024-04-03 DIAGNOSIS — K70.10 ALCOHOLIC HEPATITIS WITHOUT ASCITES: ICD-10-CM

## 2024-04-03 DIAGNOSIS — K70.30 ALCOHOLIC CIRRHOSIS OF LIVER WITHOUT ASCITES (HCC): ICD-10-CM

## 2024-04-03 DIAGNOSIS — Z12.11 COLON CANCER SCREENING: ICD-10-CM

## 2024-04-03 PROCEDURE — 99214 OFFICE O/P EST MOD 30 MIN: CPT | Performed by: INTERNAL MEDICINE

## 2024-04-03 RX ORDER — LANOLIN ALCOHOL/MO/W.PET/CERES
400 CREAM (GRAM) TOPICAL DAILY
COMMUNITY

## 2024-04-03 NOTE — PROGRESS NOTES
extremities normal, atraumatic, no cyanosis or edema  Skin: Skin color, texture, turgor normal. No rashes or lesions  Neurologic: Non focal, speech clear,   Psychiatry: Mood appropriate, no evidence of psychosis        Labs: Reviewed last labs/outside records          Assessment and Plan:  Harsha was seen today for follow-up.    Diagnoses and all orders for this visit:    Elevated LFTs    Alcoholic hepatitis without ascites    Alcoholic cirrhosis of liver without ascites (HCC)  -     CBC; Future  -     Comprehensive Metabolic Panel; Future  -     Protime-INR; Future    Colon cancer screening       Diagnosis Orders   1. Elevated LFTs        2. Alcoholic hepatitis without ascites        3. Alcoholic cirrhosis of liver without ascites (HCC)  CBC    Comprehensive Metabolic Panel    Protime-INR      4. Colon cancer screening          Orders Placed This Encounter   Procedures    CBC     Standing Status:   Future     Standing Expiration Date:   4/3/2025    Comprehensive Metabolic Panel     Standing Status:   Future     Standing Expiration Date:   4/3/2025    Protime-INR     Standing Status:   Future     Standing Expiration Date:   4/3/2025     Order Specific Question:   Daily Coumadin Dose?     Answer:   None     #1 elevated LFTs  -Likely secondary to EtOH hepatitis with AST greater than ALT.  -Maddrey discriminant score was noted to be low during admission signifying good prognosis.  -Ultrasound without any biliary dilation, normal gallbladder  -Recommend EtOH cessation, continue to trend LFTs. He had Labs done at [facility] 03/292/24 with normalization of LFTs except for TB 1.2      #2 decompensated alcoholic cirrhosis  -MELD score 14 on admission.  -Diagnosed initially back in May 2023, patient stopped drinking up until December 2023 then resumed.  Prior to May 2023 was drinking significant amounts of hard liquor.  -Will need EGD for variceal surveillance. Currently on Nadolol   -HCC screening every 6 months.  Last

## 2024-04-10 ENCOUNTER — TELEPHONE (OUTPATIENT)
Dept: FAMILY MEDICINE CLINIC | Age: 54
End: 2024-04-10

## 2024-04-10 RX ORDER — ATORVASTATIN CALCIUM 40 MG/1
40 TABLET, FILM COATED ORAL NIGHTLY
Qty: 30 TABLET | Refills: 1 | Status: SHIPPED | OUTPATIENT
Start: 2024-04-10 | End: 2024-06-09

## 2024-04-10 NOTE — TELEPHONE ENCOUNTER
To Dr. Blue-    Patient said you would call Atorvastatin in for him if he wanted to start taking this again------he took this in the hospital.    LV  3/2`1/24    NV  5/21/24    [facility] [place] on [address]               Also, patient would like to do an At-Home Sleep Study Test.

## 2024-05-31 ENCOUNTER — COMMUNITY OUTREACH (OUTPATIENT)
Dept: FAMILY MEDICINE CLINIC | Age: 54
End: 2024-05-31

## 2024-06-03 LAB
A/G RATIO: 1.7 RATIO (ref 0.8–2.6)
ALBUMIN: 4.1 G/DL (ref 3.5–5.2)
ALP BLD-CCNC: 105 U/L (ref 23–144)
ALT SERPL-CCNC: 31 U/L (ref 0–60)
AST SERPL-CCNC: 29 U/L (ref 0–55)
BILIRUB SERPL-MCNC: 0.5 MG/DL (ref 0–1.2)
BUN / CREAT RATIO: 16 (ref 7–25)
BUN BLDV-MCNC: 13 MG/DL (ref 3–29)
CALCIUM SERPL-MCNC: 9.4 MG/DL (ref 8.5–10.5)
CHLORIDE BLD-SCNC: 106 MEQ/L (ref 96–110)
CO2: 25 MEQ/L (ref 19–32)
CREAT SERPL-MCNC: 0.8 MG/DL (ref 0.5–1.2)
ESTIMATED GLOMERULAR FILTRATION RATE CREATININE EQUATION: 107 MLS/MIN/1.73M2
FASTING STATUS: ABNORMAL
GLOBULIN: 2.4 G/DL (ref 1.9–3.6)
GLUCOSE BLD-MCNC: 119 MG/DL (ref 70–99)
HCT VFR BLD CALC: 45 % (ref 37.5–51)
HEMOGLOBIN: 14.9 G/DL
INR BLD: 1 (ref 0.9–1.1)
MCH RBC QN AUTO: 29.2 PG (ref 26–34)
MCHC RBC AUTO-ENTMCNC: 33.1 G/DL (ref 30.7–35.5)
MCV RBC AUTO: 88.2 FL (ref 80–100)
PDW BLD-RTO: 12.6 %
PLATELET # BLD: 153 K/UL (ref 140–400)
PMV BLD AUTO: 10.9 FL (ref 7.2–11.7)
POTASSIUM SERPL-SCNC: 5 MEQ/L (ref 3.4–5.3)
PROTHROMBIN TIME: 13 SEC (ref 11.7–13.9)
RBC # BLD: 5.1 M/UL (ref 4.14–5.8)
SODIUM BLD-SCNC: 141 MEQ/L (ref 135–148)
TOTAL PROTEIN: 6.5 G/DL (ref 6–8.3)
WBC # BLD: 5 K/UL (ref 3.5–10.9)

## 2024-06-05 ENCOUNTER — OFFICE VISIT (OUTPATIENT)
Dept: GASTROENTEROLOGY | Age: 54
End: 2024-06-05
Payer: COMMERCIAL

## 2024-06-05 ENCOUNTER — PREP FOR PROCEDURE (OUTPATIENT)
Dept: GASTROENTEROLOGY | Age: 54
End: 2024-06-05

## 2024-06-05 ENCOUNTER — OFFICE VISIT (OUTPATIENT)
Dept: FAMILY MEDICINE CLINIC | Age: 54
End: 2024-06-05
Payer: COMMERCIAL

## 2024-06-05 VITALS
WEIGHT: 263 LBS | DIASTOLIC BLOOD PRESSURE: 80 MMHG | OXYGEN SATURATION: 96 % | HEART RATE: 83 BPM | HEIGHT: 72 IN | BODY MASS INDEX: 35.62 KG/M2 | SYSTOLIC BLOOD PRESSURE: 130 MMHG

## 2024-06-05 VITALS
OXYGEN SATURATION: 92 % | WEIGHT: 255.4 LBS | DIASTOLIC BLOOD PRESSURE: 58 MMHG | HEIGHT: 72 IN | SYSTOLIC BLOOD PRESSURE: 124 MMHG | TEMPERATURE: 97.2 F | BODY MASS INDEX: 34.59 KG/M2 | HEART RATE: 79 BPM

## 2024-06-05 DIAGNOSIS — E78.2 MIXED HYPERLIPIDEMIA: ICD-10-CM

## 2024-06-05 DIAGNOSIS — I85.10 SECONDARY ESOPHAGEAL VARICES WITHOUT BLEEDING (HCC): ICD-10-CM

## 2024-06-05 DIAGNOSIS — R93.89 ABNORMAL FINDING ON CT SCAN: ICD-10-CM

## 2024-06-05 DIAGNOSIS — M54.50 CHRONIC BILATERAL LOW BACK PAIN WITHOUT SCIATICA: Primary | ICD-10-CM

## 2024-06-05 DIAGNOSIS — G89.29 CHRONIC BILATERAL LOW BACK PAIN WITHOUT SCIATICA: Primary | ICD-10-CM

## 2024-06-05 DIAGNOSIS — K70.30 ALCOHOLIC CIRRHOSIS OF LIVER WITHOUT ASCITES (HCC): Primary | ICD-10-CM

## 2024-06-05 DIAGNOSIS — Z12.11 ENCOUNTER FOR SCREENING COLONOSCOPY: ICD-10-CM

## 2024-06-05 DIAGNOSIS — Z12.11 SCREEN FOR COLON CANCER: ICD-10-CM

## 2024-06-05 PROCEDURE — 3017F COLORECTAL CA SCREEN DOC REV: CPT | Performed by: NURSE PRACTITIONER

## 2024-06-05 PROCEDURE — 99215 OFFICE O/P EST HI 40 MIN: CPT | Performed by: NURSE PRACTITIONER

## 2024-06-05 PROCEDURE — G8417 CALC BMI ABV UP PARAM F/U: HCPCS | Performed by: NURSE PRACTITIONER

## 2024-06-05 PROCEDURE — 3017F COLORECTAL CA SCREEN DOC REV: CPT | Performed by: INTERNAL MEDICINE

## 2024-06-05 PROCEDURE — G8427 DOCREV CUR MEDS BY ELIG CLIN: HCPCS | Performed by: INTERNAL MEDICINE

## 2024-06-05 PROCEDURE — G8427 DOCREV CUR MEDS BY ELIG CLIN: HCPCS | Performed by: NURSE PRACTITIONER

## 2024-06-05 PROCEDURE — 1036F TOBACCO NON-USER: CPT | Performed by: NURSE PRACTITIONER

## 2024-06-05 PROCEDURE — 99214 OFFICE O/P EST MOD 30 MIN: CPT | Performed by: INTERNAL MEDICINE

## 2024-06-05 PROCEDURE — 1036F TOBACCO NON-USER: CPT | Performed by: INTERNAL MEDICINE

## 2024-06-05 PROCEDURE — G8417 CALC BMI ABV UP PARAM F/U: HCPCS | Performed by: INTERNAL MEDICINE

## 2024-06-05 RX ORDER — ATORVASTATIN CALCIUM 40 MG/1
40 TABLET, FILM COATED ORAL NIGHTLY
Qty: 90 TABLET | Refills: 1 | Status: SHIPPED | OUTPATIENT
Start: 2024-06-05 | End: 2024-12-02

## 2024-06-05 RX ORDER — SIMETHICONE 80 MG
80 TABLET,CHEWABLE ORAL ONCE
Qty: 3 TABLET | Refills: 0 | Status: SHIPPED | OUTPATIENT
Start: 2024-06-05 | End: 2024-06-05

## 2024-06-05 RX ORDER — LANOLIN ALCOHOL/MO/W.PET/CERES
100 CREAM (GRAM) TOPICAL DAILY
Qty: 90 TABLET | Refills: 1 | Status: SHIPPED | OUTPATIENT
Start: 2024-06-05

## 2024-06-05 RX ORDER — ACAMPROSATE CALCIUM 333 MG/1
TABLET, DELAYED RELEASE ORAL
Qty: 270 TABLET | Refills: 1 | Status: SHIPPED | OUTPATIENT
Start: 2024-06-05

## 2024-06-05 RX ORDER — NADOLOL 20 MG/1
20 TABLET ORAL DAILY
Qty: 90 TABLET | Refills: 1 | Status: SHIPPED | OUTPATIENT
Start: 2024-06-05

## 2024-06-05 RX ORDER — PAROXETINE HYDROCHLORIDE 20 MG/1
30 TABLET, FILM COATED ORAL EVERY MORNING
Qty: 45 TABLET | Refills: 5 | Status: SHIPPED | OUTPATIENT
Start: 2024-06-05

## 2024-06-05 RX ORDER — POLYETHYLENE GLYCOL 3350, SODIUM SULFATE, SODIUM CHLORIDE, POTASSIUM CHLORIDE, ASCORBIC ACID, SODIUM ASCORBATE 140-9-5.2G
1 KIT ORAL ONCE
Qty: 1 EACH | Refills: 0 | Status: SHIPPED | OUTPATIENT
Start: 2024-06-05 | End: 2024-06-05

## 2024-06-05 ASSESSMENT — ENCOUNTER SYMPTOMS
BLOOD IN STOOL: 0
NAUSEA: 0
WHEEZING: 0
CONSTIPATION: 0
EYE PAIN: 0
COUGH: 0
DIARRHEA: 0
EYE DISCHARGE: 0
SHORTNESS OF BREATH: 0
BACK PAIN: 1
COLOR CHANGE: 0
VOMITING: 0
ABDOMINAL PAIN: 0

## 2024-06-05 NOTE — PROGRESS NOTES
Harsha Liu 53 y.o. male was seen by TAMIKO Myers on 6/5/2024     Wt Readings from Last 3 Encounters:   06/05/24 115.8 kg (255 lb 6.4 oz)   04/03/24 116.4 kg (256 lb 9.6 oz)   03/21/24 115.7 kg (255 lb)       HPI  Harsha Liu is a pleasant 53 y.o.  male who presents today for follow-up on alcoholic cirrhosis.  He has a past medical history of anxiety, cirrhosis and hernia, inguinal, left.  He denies NSAID use.  CT scan done revealed cirrhotic morphology of the liver with portal hypertension and gastroesophageal varices.  He is taking Nadolol.  He has never had a EGD or colonoscopy.  His abdominal ultrasound done on 3- showed moderately diffuse increased echogenicity throughout the liver and mild hepatic enlargement without focal mass or ductal dilation, normal-appearing gallbladder, and normal sonogram of the right kidney and retroperitoneum.  His lab work done on 6-3-3024 showed PT 13, INR 1.0, Platelets 153, Total Bilirubin 0.5, AST 29, ALT 31, and Alkaline Phosphatase 105.  He recalled he is not following a low sodium diet in the last month.  No alcohol use since March of 2024.  Prior to that reported significant alcohol use of ten shots of liquor a day for twenty years.  He does currently work at least 55 hours a week with no worsening fatigue.  No foggy brain, no leg swelling, or increase in abdominal girth.  His appetite is good and weight is stable.  No nausea or vomiting.  No abdominal pain, bloating or distention.  No heartburn or acid reflux.  No nocturnal awakenings with acid reflux.  No dysphagia or pain with swallowing.  No excess belching or flatulence.  He denies changes in his bowel pattern. His typical bowel pattern is two times daily with soft brown formed stools.  No diarrhea or constipation.  No blood in his stools or melena.  No family history of stomach or colon cancer.    ROS  Review of Systems   Constitutional:  Negative for appetite change, chills,  Patient calls asking if he could be prescribed lidocaine injection or cream for the pain in his neck. Patient states that pain keeps him up at noc.    He is also requesting medication to stop smoking. Patient would take anything that Dr. Lindsey approves.    Writer looked in chart and patient did call Dr. Ricks's office asking for stronger medication than Meloxicam.    Message printed for provider to address.

## 2024-06-05 NOTE — PROGRESS NOTES
Outpatient Clinic Visit Note    Patient: Harsha Liu  : 1970 (53 y.o.)  Date: 2024    CC:  Chief Complaint   Patient presents with    Follow-up     2 month follow up    Medication Refill     Regular check up and refills        HPI: he notices a fair amount of joint pain from hips down.  He denies morning stiffness in the toes or fingers.  No redness or swelling of the joints.     Past Medical History:    Past Medical History:   Diagnosis Date    Anxiety     Hernia, inguinal, left     Liver disease 3/16/23       Past Surgical History:  Past Surgical History:   Procedure Laterality Date    CYST REMOVAL Left     LEFT ARM    HERNIA REPAIR      left inguinal hernia- Dr. Vanegas        Home Medications:  Current Outpatient Medications   Medication Sig Dispense Refill    nadolol (CORGARD) 20 MG tablet Take 1 tablet by mouth daily 90 tablet 1    atorvastatin (LIPITOR) 40 MG tablet Take 1 tablet by mouth nightly 90 tablet 1    acamprosate (CAMPRAL) 333 MG tablet take 1 tablet THREE TIMES DAILY with food 270 tablet 1    thiamine 100 MG tablet Take 1 tablet by mouth daily 90 tablet 1    PARoxetine (PAXIL) 20 MG tablet Take 1.5 tablets by mouth every morning 45 tablet 5    folic acid (FOLVITE) 400 MCG tablet Take 1 tablet by mouth daily      simethicone (MYLICON) 80 MG chewable tablet Take 1 tablet by mouth once for 1 dose (Patient not taking: Reported on 2024) 3 tablet 0    PEG-KCl-NaCl-NaSulf-Na Asc-C (PLENVU) 140 g SOLR Take 1 kit by mouth once for 1 dose (Patient not taking: Reported on 2024) 1 each 0    Omega 3 1000 MG CAPS Take 1 capsule by mouth in the morning and at bedtime Takes OTC (Patient not taking: Reported on 2024)       No current facility-administered medications for this visit.        Allergies:    Patient has no known allergies.    Family History:       Problem Relation Age of Onset    Arthritis Mother     Breast Cancer Mother     Alcohol Abuse Father     Heart Attack Father

## 2024-06-06 RX ORDER — SODIUM CHLORIDE 0.9 % (FLUSH) 0.9 %
5-40 SYRINGE (ML) INJECTION EVERY 12 HOURS SCHEDULED
Status: CANCELLED | OUTPATIENT
Start: 2024-06-06

## 2024-06-06 RX ORDER — SODIUM CHLORIDE 9 MG/ML
INJECTION, SOLUTION INTRAVENOUS PRN
Status: CANCELLED | OUTPATIENT
Start: 2024-06-06

## 2024-06-06 RX ORDER — SODIUM CHLORIDE 0.9 % (FLUSH) 0.9 %
5-40 SYRINGE (ML) INJECTION PRN
Status: CANCELLED | OUTPATIENT
Start: 2024-06-06

## 2024-06-06 RX ORDER — SODIUM CHLORIDE, SODIUM LACTATE, POTASSIUM CHLORIDE, CALCIUM CHLORIDE 600; 310; 30; 20 MG/100ML; MG/100ML; MG/100ML; MG/100ML
INJECTION, SOLUTION INTRAVENOUS CONTINUOUS
Status: CANCELLED | OUTPATIENT
Start: 2024-06-06

## 2024-06-14 RX ORDER — NADOLOL 20 MG/1
20 TABLET ORAL DAILY
Qty: 90 TABLET | Refills: 1 | OUTPATIENT
Start: 2024-06-14

## 2024-06-14 RX ORDER — ATORVASTATIN CALCIUM 40 MG/1
40 TABLET, FILM COATED ORAL NIGHTLY
Qty: 90 TABLET | Refills: 1 | OUTPATIENT
Start: 2024-06-14 | End: 2024-12-11

## 2024-06-14 RX ORDER — ACAMPROSATE CALCIUM 333 MG/1
TABLET, DELAYED RELEASE ORAL
Qty: 270 TABLET | Refills: 1 | OUTPATIENT
Start: 2024-06-14

## 2024-06-14 RX ORDER — LANOLIN ALCOHOL/MO/W.PET/CERES
100 CREAM (GRAM) TOPICAL DAILY
Qty: 90 TABLET | Refills: 1 | OUTPATIENT
Start: 2024-06-14

## 2024-06-14 RX ORDER — PAROXETINE HYDROCHLORIDE 20 MG/1
30 TABLET, FILM COATED ORAL EVERY MORNING
Qty: 45 TABLET | Refills: 5 | OUTPATIENT
Start: 2024-06-14

## 2024-06-14 NOTE — TELEPHONE ENCOUNTER
[facility] on [facility] closed - patient would like medication called into [facility] on [address]

## 2024-06-19 RX ORDER — PAROXETINE HYDROCHLORIDE 20 MG/1
30 TABLET, FILM COATED ORAL EVERY MORNING
Qty: 45 TABLET | Refills: 5 | Status: SHIPPED | OUTPATIENT
Start: 2024-06-19

## 2024-06-19 RX ORDER — ATORVASTATIN CALCIUM 40 MG/1
40 TABLET, FILM COATED ORAL NIGHTLY
Qty: 90 TABLET | Refills: 1 | Status: SHIPPED | OUTPATIENT
Start: 2024-06-19 | End: 2024-12-16

## 2024-06-19 RX ORDER — LANOLIN ALCOHOL/MO/W.PET/CERES
100 CREAM (GRAM) TOPICAL DAILY
Qty: 90 TABLET | Refills: 1 | Status: SHIPPED | OUTPATIENT
Start: 2024-06-19

## 2024-06-19 RX ORDER — NADOLOL 20 MG/1
20 TABLET ORAL DAILY
Qty: 90 TABLET | Refills: 1 | Status: SHIPPED | OUTPATIENT
Start: 2024-06-19

## 2024-06-19 RX ORDER — ACAMPROSATE CALCIUM 333 MG/1
TABLET, DELAYED RELEASE ORAL
Qty: 270 TABLET | Refills: 1 | Status: SHIPPED | OUTPATIENT
Start: 2024-06-19

## 2024-07-05 PROBLEM — Z12.11 SCREEN FOR COLON CANCER: Status: RESOLVED | Noted: 2024-06-05 | Resolved: 2024-07-05

## 2024-07-11 ENCOUNTER — TELEPHONE (OUTPATIENT)
Dept: GASTROENTEROLOGY | Age: 54
End: 2024-07-11

## 2024-07-11 NOTE — TELEPHONE ENCOUNTER
[facility]; C/E are pending auth per my call to Sonido KELLER Case# [id number]

## 2024-07-18 ENCOUNTER — HOSPITAL ENCOUNTER (INPATIENT)
Age: 54
LOS: 3 days | Discharge: HOME | DRG: 897 | End: 2024-07-21
Attending: EMERGENCY MEDICINE | Admitting: INTERNAL MEDICINE
Payer: COMMERCIAL

## 2024-07-18 ENCOUNTER — APPOINTMENT (OUTPATIENT)
Dept: GENERAL RADIOLOGY | Age: 54
DRG: 897 | End: 2024-07-18
Payer: COMMERCIAL

## 2024-07-18 DIAGNOSIS — R79.89 ELEVATED LFTS: ICD-10-CM

## 2024-07-18 DIAGNOSIS — F10.930 ALCOHOL WITHDRAWAL SYNDROME, UNCOMPLICATED (HCC): ICD-10-CM

## 2024-07-18 DIAGNOSIS — D69.6 THROMBOCYTOPENIA (HCC): ICD-10-CM

## 2024-07-18 DIAGNOSIS — K70.30 ALCOHOLIC CIRRHOSIS OF LIVER WITHOUT ASCITES (HCC): ICD-10-CM

## 2024-07-18 DIAGNOSIS — R74.01 TRANSAMINITIS: ICD-10-CM

## 2024-07-18 DIAGNOSIS — F10.90 ALCOHOL USE DISORDER: Primary | ICD-10-CM

## 2024-07-18 LAB
ALBUMIN SERPL-MCNC: 4.1 GM/DL (ref 3.4–5)
ALCOHOL SCREEN SERUM: 0.29 %WT/VOL
ALP BLD-CCNC: 143 IU/L (ref 40–128)
ALT SERPL-CCNC: 542 U/L (ref 10–40)
ANION GAP SERPL CALCULATED.3IONS-SCNC: 17 MMOL/L (ref 7–16)
AST SERPL-CCNC: 771 IU/L (ref 15–37)
BASOPHILS ABSOLUTE: 0.1 K/CU MM
BASOPHILS RELATIVE PERCENT: 1.8 % (ref 0–1)
BILIRUB SERPL-MCNC: 1.7 MG/DL (ref 0–1)
BUN SERPL-MCNC: 14 MG/DL (ref 6–23)
CALCIUM SERPL-MCNC: 8.7 MG/DL (ref 8.3–10.6)
CHLORIDE BLD-SCNC: 102 MMOL/L (ref 99–110)
CO2: 21 MMOL/L (ref 21–32)
CREAT SERPL-MCNC: 0.7 MG/DL (ref 0.9–1.3)
DIFFERENTIAL TYPE: ABNORMAL
EOSINOPHILS ABSOLUTE: 0.1 K/CU MM
EOSINOPHILS RELATIVE PERCENT: 1.5 % (ref 0–3)
GFR, ESTIMATED: >90 ML/MIN/1.73M2
GLUCOSE SERPL-MCNC: 123 MG/DL (ref 70–99)
HCT VFR BLD CALC: 51 % (ref 42–52)
HEMOGLOBIN: 17.9 GM/DL (ref 13.5–18)
IMMATURE NEUTROPHIL %: 0.4 % (ref 0–0.43)
LIPASE: 33 IU/L (ref 13–60)
LYMPHOCYTES ABSOLUTE: 2.3 K/CU MM
LYMPHOCYTES RELATIVE PERCENT: 41.9 % (ref 24–44)
MCH RBC QN AUTO: 28.7 PG (ref 27–31)
MCHC RBC AUTO-ENTMCNC: 35.1 % (ref 32–36)
MCV RBC AUTO: 81.9 FL (ref 78–100)
MONOCYTES ABSOLUTE: 0.6 K/CU MM
MONOCYTES RELATIVE PERCENT: 10.3 % (ref 0–4)
NEUTROPHILS ABSOLUTE: 2.4 K/CU MM
NEUTROPHILS RELATIVE PERCENT: 44.1 % (ref 36–66)
NUCLEATED RBC %: 0 %
PDW BLD-RTO: 13.3 % (ref 11.7–14.9)
PLATELET # BLD: 189 K/CU MM (ref 140–440)
PMV BLD AUTO: 9.7 FL (ref 7.5–11.1)
POTASSIUM SERPL-SCNC: 3.8 MMOL/L (ref 3.5–5.1)
RBC # BLD: 6.23 M/CU MM (ref 4.6–6.2)
SODIUM BLD-SCNC: 140 MMOL/L (ref 135–145)
TOTAL IMMATURE NEUTOROPHIL: 0.02 K/CU MM
TOTAL NUCLEATED RBC: 0 K/CU MM
TOTAL PROTEIN: 7.8 GM/DL (ref 6.4–8.2)
TROPONIN, HIGH SENSITIVITY: 7 NG/L (ref 0–22)
TROPONIN, HIGH SENSITIVITY: <6 NG/L (ref 0–22)
WBC # BLD: 5.5 K/CU MM (ref 4–10.5)

## 2024-07-18 PROCEDURE — 2500000003 HC RX 250 WO HCPCS: Performed by: EMERGENCY MEDICINE

## 2024-07-18 PROCEDURE — 2580000003 HC RX 258: Performed by: EMERGENCY MEDICINE

## 2024-07-18 PROCEDURE — 80053 COMPREHEN METABOLIC PANEL: CPT

## 2024-07-18 PROCEDURE — 96375 TX/PRO/DX INJ NEW DRUG ADDON: CPT

## 2024-07-18 PROCEDURE — 85025 COMPLETE CBC W/AUTO DIFF WBC: CPT

## 2024-07-18 PROCEDURE — 2140000000 HC CCU INTERMEDIATE R&B

## 2024-07-18 PROCEDURE — 6370000000 HC RX 637 (ALT 250 FOR IP): Performed by: INTERNAL MEDICINE

## 2024-07-18 PROCEDURE — 84484 ASSAY OF TROPONIN QUANT: CPT

## 2024-07-18 PROCEDURE — 6360000002 HC RX W HCPCS: Performed by: INTERNAL MEDICINE

## 2024-07-18 PROCEDURE — G0480 DRUG TEST DEF 1-7 CLASSES: HCPCS

## 2024-07-18 PROCEDURE — 99285 EMERGENCY DEPT VISIT HI MDM: CPT

## 2024-07-18 PROCEDURE — 71045 X-RAY EXAM CHEST 1 VIEW: CPT

## 2024-07-18 PROCEDURE — 2580000003 HC RX 258: Performed by: INTERNAL MEDICINE

## 2024-07-18 PROCEDURE — 96374 THER/PROPH/DIAG INJ IV PUSH: CPT

## 2024-07-18 PROCEDURE — 6370000000 HC RX 637 (ALT 250 FOR IP): Performed by: NURSE PRACTITIONER

## 2024-07-18 PROCEDURE — 6360000002 HC RX W HCPCS: Performed by: EMERGENCY MEDICINE

## 2024-07-18 PROCEDURE — 93005 ELECTROCARDIOGRAM TRACING: CPT | Performed by: EMERGENCY MEDICINE

## 2024-07-18 PROCEDURE — 83690 ASSAY OF LIPASE: CPT

## 2024-07-18 PROCEDURE — 6370000000 HC RX 637 (ALT 250 FOR IP): Performed by: EMERGENCY MEDICINE

## 2024-07-18 PROCEDURE — 94761 N-INVAS EAR/PLS OXIMETRY MLT: CPT

## 2024-07-18 RX ORDER — NADOLOL 20 MG/1
20 TABLET ORAL DAILY
Status: DISCONTINUED | OUTPATIENT
Start: 2024-07-19 | End: 2024-07-21 | Stop reason: HOSPADM

## 2024-07-18 RX ORDER — LANOLIN ALCOHOL/MO/W.PET/CERES
100 CREAM (GRAM) TOPICAL DAILY
Status: DISCONTINUED | OUTPATIENT
Start: 2024-07-18 | End: 2024-07-21 | Stop reason: HOSPADM

## 2024-07-18 RX ORDER — ATORVASTATIN CALCIUM 40 MG/1
40 TABLET, FILM COATED ORAL NIGHTLY
Status: DISCONTINUED | OUTPATIENT
Start: 2024-07-18 | End: 2024-07-21 | Stop reason: HOSPADM

## 2024-07-18 RX ORDER — ACETAMINOPHEN 325 MG/1
650 TABLET ORAL EVERY 6 HOURS PRN
Status: DISCONTINUED | OUTPATIENT
Start: 2024-07-18 | End: 2024-07-21 | Stop reason: HOSPADM

## 2024-07-18 RX ORDER — PHENOBARBITAL 32.4 MG/1
32.4 TABLET ORAL 4 TIMES DAILY
Status: DISCONTINUED | OUTPATIENT
Start: 2024-07-18 | End: 2024-07-18

## 2024-07-18 RX ORDER — ENOXAPARIN SODIUM 100 MG/ML
30 INJECTION SUBCUTANEOUS 2 TIMES DAILY
Status: DISCONTINUED | OUTPATIENT
Start: 2024-07-18 | End: 2024-07-21 | Stop reason: HOSPADM

## 2024-07-18 RX ORDER — SODIUM CHLORIDE 0.9 % (FLUSH) 0.9 %
5-40 SYRINGE (ML) INJECTION PRN
Status: DISCONTINUED | OUTPATIENT
Start: 2024-07-18 | End: 2024-07-21 | Stop reason: HOSPADM

## 2024-07-18 RX ORDER — PHENOBARBITAL 32.4 MG/1
16.2 TABLET ORAL EVERY 6 HOURS PRN
Status: DISCONTINUED | OUTPATIENT
Start: 2024-07-20 | End: 2024-07-18

## 2024-07-18 RX ORDER — PAROXETINE HYDROCHLORIDE 20 MG/1
20 TABLET, FILM COATED ORAL ONCE
Status: COMPLETED | OUTPATIENT
Start: 2024-07-18 | End: 2024-07-18

## 2024-07-18 RX ORDER — ONDANSETRON 2 MG/ML
4 INJECTION INTRAMUSCULAR; INTRAVENOUS EVERY 6 HOURS PRN
Status: DISCONTINUED | OUTPATIENT
Start: 2024-07-18 | End: 2024-07-21 | Stop reason: HOSPADM

## 2024-07-18 RX ORDER — SODIUM CHLORIDE 9 MG/ML
INJECTION, SOLUTION INTRAVENOUS PRN
Status: DISCONTINUED | OUTPATIENT
Start: 2024-07-18 | End: 2024-07-21 | Stop reason: HOSPADM

## 2024-07-18 RX ORDER — SODIUM CHLORIDE 0.9 % (FLUSH) 0.9 %
5-40 SYRINGE (ML) INJECTION EVERY 12 HOURS SCHEDULED
Status: DISCONTINUED | OUTPATIENT
Start: 2024-07-18 | End: 2024-07-21 | Stop reason: HOSPADM

## 2024-07-18 RX ORDER — LANOLIN ALCOHOL/MO/W.PET/CERES
400 CREAM (GRAM) TOPICAL DAILY
Status: DISCONTINUED | OUTPATIENT
Start: 2024-07-18 | End: 2024-07-18

## 2024-07-18 RX ORDER — FAMOTIDINE 10 MG/ML
20 INJECTION, SOLUTION INTRAVENOUS ONCE
Status: COMPLETED | OUTPATIENT
Start: 2024-07-18 | End: 2024-07-18

## 2024-07-18 RX ORDER — PHENOBARBITAL 32.4 MG/1
32.4 TABLET ORAL 2 TIMES DAILY
Status: COMPLETED | OUTPATIENT
Start: 2024-07-19 | End: 2024-07-20

## 2024-07-18 RX ORDER — PHENOBARBITAL 32.4 MG/1
32.4 TABLET ORAL 2 TIMES DAILY
Status: DISCONTINUED | OUTPATIENT
Start: 2024-07-19 | End: 2024-07-18

## 2024-07-18 RX ORDER — SODIUM CHLORIDE 9 MG/ML
INJECTION, SOLUTION INTRAVENOUS CONTINUOUS
Status: DISCONTINUED | OUTPATIENT
Start: 2024-07-18 | End: 2024-07-20

## 2024-07-18 RX ORDER — KETOROLAC TROMETHAMINE 15 MG/ML
30 INJECTION, SOLUTION INTRAMUSCULAR; INTRAVENOUS ONCE
Status: COMPLETED | OUTPATIENT
Start: 2024-07-18 | End: 2024-07-18

## 2024-07-18 RX ORDER — PROMETHAZINE HYDROCHLORIDE 25 MG/ML
25 INJECTION, SOLUTION INTRAMUSCULAR; INTRAVENOUS ONCE
Status: COMPLETED | OUTPATIENT
Start: 2024-07-18 | End: 2024-07-18

## 2024-07-18 RX ORDER — PAROXETINE HYDROCHLORIDE 20 MG/1
20 TABLET, FILM COATED ORAL EVERY MORNING
Status: DISCONTINUED | OUTPATIENT
Start: 2024-07-19 | End: 2024-07-21 | Stop reason: HOSPADM

## 2024-07-18 RX ORDER — FOLIC ACID 1 MG/1
1 TABLET ORAL DAILY
Status: DISCONTINUED | OUTPATIENT
Start: 2024-07-18 | End: 2024-07-21 | Stop reason: HOSPADM

## 2024-07-18 RX ORDER — PHENOBARBITAL 32.4 MG/1
32.4 TABLET ORAL EVERY 6 HOURS PRN
Status: DISCONTINUED | OUTPATIENT
Start: 2024-07-18 | End: 2024-07-18

## 2024-07-18 RX ORDER — PHENOBARBITAL 32.4 MG/1
16.2 TABLET ORAL 2 TIMES DAILY
Status: DISCONTINUED | OUTPATIENT
Start: 2024-07-20 | End: 2024-07-18

## 2024-07-18 RX ORDER — ONDANSETRON 2 MG/ML
4 INJECTION INTRAMUSCULAR; INTRAVENOUS EVERY 6 HOURS PRN
Status: DISCONTINUED | OUTPATIENT
Start: 2024-07-18 | End: 2024-07-18

## 2024-07-18 RX ORDER — PHENOBARBITAL SODIUM 65 MG/ML
65 INJECTION, SOLUTION INTRAMUSCULAR; INTRAVENOUS EVERY 6 HOURS PRN
Status: DISCONTINUED | OUTPATIENT
Start: 2024-07-18 | End: 2024-07-21 | Stop reason: HOSPADM

## 2024-07-18 RX ORDER — ACETAMINOPHEN 650 MG/1
650 SUPPOSITORY RECTAL EVERY 6 HOURS PRN
Status: DISCONTINUED | OUTPATIENT
Start: 2024-07-18 | End: 2024-07-21 | Stop reason: HOSPADM

## 2024-07-18 RX ORDER — PHENOBARBITAL 32.4 MG/1
16.2 TABLET ORAL EVERY 6 HOURS PRN
Status: DISCONTINUED | OUTPATIENT
Start: 2024-07-21 | End: 2024-07-18

## 2024-07-18 RX ORDER — PHENOBARBITAL 32.4 MG/1
16.2 TABLET ORAL 2 TIMES DAILY
Status: COMPLETED | OUTPATIENT
Start: 2024-07-20 | End: 2024-07-21

## 2024-07-18 RX ORDER — PHENOBARBITAL 32.4 MG/1
32.4 TABLET ORAL 4 TIMES DAILY
Status: COMPLETED | OUTPATIENT
Start: 2024-07-19 | End: 2024-07-19

## 2024-07-18 RX ORDER — 0.9 % SODIUM CHLORIDE 0.9 %
1000 INTRAVENOUS SOLUTION INTRAVENOUS ONCE
Status: COMPLETED | OUTPATIENT
Start: 2024-07-18 | End: 2024-07-18

## 2024-07-18 RX ORDER — ONDANSETRON 4 MG/1
4 TABLET, ORALLY DISINTEGRATING ORAL EVERY 8 HOURS PRN
Status: DISCONTINUED | OUTPATIENT
Start: 2024-07-18 | End: 2024-07-21 | Stop reason: HOSPADM

## 2024-07-18 RX ORDER — POLYETHYLENE GLYCOL 3350 17 G/17G
17 POWDER, FOR SOLUTION ORAL DAILY PRN
Status: DISCONTINUED | OUTPATIENT
Start: 2024-07-18 | End: 2024-07-21 | Stop reason: HOSPADM

## 2024-07-18 RX ORDER — PHENOBARBITAL 32.4 MG/1
32.4 TABLET ORAL EVERY 6 HOURS PRN
Status: DISCONTINUED | OUTPATIENT
Start: 2024-07-19 | End: 2024-07-18

## 2024-07-18 RX ADMIN — SODIUM CHLORIDE, PRESERVATIVE FREE 10 ML: 5 INJECTION INTRAVENOUS at 11:59

## 2024-07-18 RX ADMIN — PHENOBARBITAL SODIUM 65 MG: 65 INJECTION INTRAMUSCULAR; INTRAVENOUS at 18:33

## 2024-07-18 RX ADMIN — PHENOBARBITAL SODIUM 780 MG: 130 INJECTION, SOLUTION INTRAMUSCULAR; INTRAVENOUS at 11:14

## 2024-07-18 RX ADMIN — SODIUM CHLORIDE 1000 ML: 9 INJECTION, SOLUTION INTRAVENOUS at 06:29

## 2024-07-18 RX ADMIN — ONDANSETRON 4 MG: 2 INJECTION INTRAMUSCULAR; INTRAVENOUS at 06:36

## 2024-07-18 RX ADMIN — ONDANSETRON 4 MG: 2 INJECTION INTRAMUSCULAR; INTRAVENOUS at 15:25

## 2024-07-18 RX ADMIN — SODIUM CHLORIDE, PRESERVATIVE FREE 10 ML: 5 INJECTION INTRAVENOUS at 21:02

## 2024-07-18 RX ADMIN — KETOROLAC TROMETHAMINE 30 MG: 15 INJECTION, SOLUTION INTRAMUSCULAR; INTRAVENOUS at 09:58

## 2024-07-18 RX ADMIN — SODIUM CHLORIDE: 9 INJECTION, SOLUTION INTRAVENOUS at 14:20

## 2024-07-18 RX ADMIN — Medication 100 MG: at 10:49

## 2024-07-18 RX ADMIN — ENOXAPARIN SODIUM 30 MG: 100 INJECTION SUBCUTANEOUS at 21:01

## 2024-07-18 RX ADMIN — FOLIC ACID 1 MG: 1 TABLET ORAL at 15:20

## 2024-07-18 RX ADMIN — PROMETHAZINE HYDROCHLORIDE 25 MG: 25 INJECTION INTRAMUSCULAR; INTRAVENOUS at 10:47

## 2024-07-18 RX ADMIN — ONDANSETRON 4 MG: 2 INJECTION INTRAMUSCULAR; INTRAVENOUS at 09:58

## 2024-07-18 RX ADMIN — FAMOTIDINE 20 MG: 10 INJECTION, SOLUTION INTRAVENOUS at 06:29

## 2024-07-18 RX ADMIN — ENOXAPARIN SODIUM 30 MG: 100 INJECTION SUBCUTANEOUS at 15:20

## 2024-07-18 RX ADMIN — ATORVASTATIN CALCIUM 40 MG: 40 TABLET, FILM COATED ORAL at 21:01

## 2024-07-18 RX ADMIN — PAROXETINE HYDROCHLORIDE 20 MG: 20 TABLET, FILM COATED ORAL at 21:27

## 2024-07-18 ASSESSMENT — PAIN SCALES - GENERAL
PAINLEVEL_OUTOF10: 5
PAINLEVEL_OUTOF10: 8
PAINLEVEL_OUTOF10: 0
PAINLEVEL_OUTOF10: 7

## 2024-07-18 ASSESSMENT — LIFESTYLE VARIABLES
HOW OFTEN DO YOU HAVE A DRINK CONTAINING ALCOHOL: 2-4 TIMES A MONTH
HOW MANY STANDARD DRINKS CONTAINING ALCOHOL DO YOU HAVE ON A TYPICAL DAY: 3 OR 4

## 2024-07-18 ASSESSMENT — PAIN DESCRIPTION - LOCATION
LOCATION: HEAD
LOCATION: HEAD
LOCATION: CHEST

## 2024-07-18 ASSESSMENT — PAIN - FUNCTIONAL ASSESSMENT: PAIN_FUNCTIONAL_ASSESSMENT: 0-10

## 2024-07-18 NOTE — ED NOTES
Medication History  [facility]    Patient Name: Harsha Liu 1970     Medication history has been completed by: Cori Abel CPhT    Source(s) of information: patient and insurance claims     Primary Care Physician: John Blue MD     Pharmacy: [facility]    Allergies as of 07/18/2024    (No Known Allergies)        Prior to Admission medications    Medication Sig Start Date End Date Taking? Authorizing Provider   thiamine 100 MG tablet Take 1 tablet by mouth daily  Patient not taking: Reported on 7/18/2024 6/19/24   John Blue MD   acamprosate (CAMPRAL) 333 MG tablet take 1 tablet THREE TIMES DAILY with food 6/19/24   John Blue MD   atorvastatin (LIPITOR) 40 MG tablet Take 1 tablet by mouth nightly 6/19/24 12/16/24  John Blue MD   nadolol (CORGARD) 20 MG tablet Take 1 tablet by mouth daily 6/19/24   John Blue MD   PARoxetine (PAXIL) 20 MG tablet Take 1.5 tablets by mouth every morning  Patient taking differently: Take 1 tablet by mouth every morning 07/18/24 Prescription noted for 1 1/2 tablets daily patient reports only taking 20 mg daily 6/19/24   John Blue MD   simethicone (MYLICON) 80 MG chewable tablet Take 1 tablet by mouth once for 1 dose  Patient not taking: Reported on 6/5/2024 6/5/24 6/5/24  Dena Dean, APRN - CNP   folic acid (FOLVITE) 400 MCG tablet Take 1 tablet by mouth daily Takes OTC    ProviderDm MD   Omega 3 1000 MG CAPS Take 1 capsule by mouth in the morning and at bedtime Takes OTC  Patient not taking: Reported on 6/5/2024    Dm Pickens MD     Comments:  Medication list reviewed with patient and insurance claims verified.  Patient reports he has taken no medications prior to ER visit.    To my knowledge the above medication history is accurate as of 7/18/2024 8:52 AM.   Cori Abel CPhT   7/18/2024 8:52 AM

## 2024-07-18 NOTE — ED NOTES
ED TO INPATIENT SBAR HANDOFF    Patient Name: Harsha Liu   :  1970  53 y.o.   Preferred Name  Will  Family/Caregiver Present no   Restraints no   C-SSRS: Risk of Suicide: No Risk  Sitter no   Sepsis Risk Score        Situation  Chief Complaint   Patient presents with    Headache    Chest Pain     Brief Description of Patient's Condition: pt presents to the ED with complaints of chest pain, headache, and nausea. Pt states that he is a alcoholic.   Mental Status: oriented  Arrived from: home    Imaging:   XR CHEST PORTABLE   Final Result   No acute findings in the chest         Electronically signed by STEVE DORAN        Abnormal labs:   Abnormal Labs Reviewed   CBC WITH AUTO DIFFERENTIAL - Abnormal; Notable for the following components:       Result Value    RBC 6.23 (*)     Monocytes % 10.3 (*)     Basophils % 1.8 (*)     All other components within normal limits   COMPREHENSIVE METABOLIC PANEL - Abnormal; Notable for the following components:    Anion Gap 17 (*)     Glucose 123 (*)     Creatinine 0.7 (*)     Total Bilirubin 1.7 (*)     Alkaline Phosphatase 143 (*)      (*)      (*)     All other components within normal limits   ETHANOL - Abnormal; Notable for the following components:    Alcohol Scrn 0.29 (*)     All other components within normal limits        Background  History:   Past Medical History:   Diagnosis Date    Anxiety     Hernia, inguinal, left     Liver disease 3/16/23       Assessment    Vitals: MEWS Score: 1  Level of Consciousness: Alert (0)   Vitals:    24 0902 24 0931 24 1002 24 1032   BP: 107/78 (!) 101/55 (!) 96/56 122/85   Pulse: 75 89 74 80   Resp: 18 15   Temp:       TempSrc:       SpO2: 92% 95% 96% 92%   Weight:       Height:         PO Status: Regular  O2 Flow Rate:      Cardiac Rhythm: normal sinus   Last documented pain medication administered:   NIH Score: NIH     Active LDA's:   Peripheral IV 24 Posterior;Right Hand  (Active)   Site Assessment Clean, dry & intact 07/18/24 0622   Line Status Brisk blood return;Flushed;Specimen collected 07/18/24 0622   Line Care Connections checked and tightened 07/18/24 0622   Phlebitis Assessment No symptoms 07/18/24 0622   Infiltration Assessment 0 07/18/24 0622   Dressing Status New dressing applied;Clean, dry & intact 07/18/24 0622   Dressing Type Transparent 07/18/24 0622       Pertinent or High Risk Medications/Drips: no   If Yes, please provide details:   Blood Product Administration: no  If Yes, please provide details:     Recommendation    Incomplete orders   Additional Comments:    If any further questions, please call Sending RN at [phone]    Electronically signed by: Electronically signed by José Miguel Hauser RN on 7/18/2024 at 11:04 AM

## 2024-07-18 NOTE — CARE COORDINATION
CM - Room ED-14--Spoke to pt at bedside , Mr Liu explained that he plans to maintain his work schedule, therefore-it would be best to use an outpatient source for management of alcohol rehabilitation . Mr Liu was agreeable to this as CM will provide information on this .

## 2024-07-18 NOTE — CARE COORDINATION
Consult received d/t ETOH abuse. Chart reviewed.  Noted that ED/CM/Alber met with pt and pt is not interested in going to inpt Rehab.  Substance Abuse Resources added to d/c instructions.  Pt is from home and is independent with ADL's.  Pt has a job, PCP and insurance. No d/c needs identified.  Notify CM if any d/c needs arise.  TE      07/18/24 1420   Service Assessment   Patient Orientation Alert and Oriented   Cognition Alert   History Provided By Medical Record   Primary Caregiver Self   Patient's Healthcare Decision Maker is:   (SELF)   Prior Functional Level Independent in ADLs/IADLs   Current Functional Level Independent in ADLs/IADLs   Can patient return to prior living arrangement Yes   Ability to make needs known: Good   Financial Resources Other (Comment)  ([facility] INSURANCE)   Community Resources [facility]   Social/Functional History   ADL Assistance Independent   Homemaking Assistance Independent   Ambulation Assistance Independent   Transfer Assistance Independent   Occupation Full time employment   Discharge Planning   DME Ordered? No   Type of Home Care Services None   Services At/After Discharge   Transition of Care Consult (CM Consult) N/A   Services At/After Discharge None

## 2024-07-18 NOTE — H&P
V2.0  History and Physical      Name:  Harsha Liu /Age/Sex: 1970  (53 y.o. male)   MRN & CSN:  [id number] & [id number] Encounter Date/Time: 2024 12:26 PM EDT   Location:  ED14/ED-14 PCP: John Blue MD       Hospital Day: 1    Assessment and Plan:   Harsha Liu is a 53 y.o. male who presents with Alcohol withdrawal syndrome, uncomplicated (HCC)    Hospital Problems             Last Modified POA    * (Principal) Alcohol withdrawal syndrome, uncomplicated (HCC) 2024 Yes       Plan:  Acute Alcohol Withdrawal: Continue Phenobarbital taper, thiamine, IVF, rescue phenobarbital also ordered, CM consulted for assistance with potential rehab placement per patient request  Alcoholic Cirrhosis with Varices: LFT's improved from prior check, repeat in AM, home Nadolol    Disposition:   Current Living situation: home  Expected Disposition: home  Estimated D/C: 3 days    Diet ADULT DIET; Regular   DVT Prophylaxis [x] Lovenox, []  Heparin, [] SCDs, [] Ambulation,  [] Eliquis, [] Xarelto, [] Coumadin   Code Status Full Code   Surrogate Decision Maker/ POA      Personally reviewed Lab Studies and Imaging     Discussed management of the case with ER physician who recommends admission and I agree    EKG interpreted personally and results sinus tachycardia    Imaging that was interpreted personally includes CXR with no acute pathology, no opacities or consolidations        History from:     patient    History of Present Illness:     Chief Complaint: Tremors  Harsha Liu is a 53 y.o. male with pmh alcoholism, alcohol induced cirrhosis that is presenting with concerns for withdrawal.  States previously he been able to be sober however recently started drinking again with last drink last night.  He became emotional when speaking about it and would really like help.  Currently denies any nausea but is endorsing tremors.  No hallucinations.    In the ED the patient was afebrile, vitals stable.  Was

## 2024-07-18 NOTE — ED PROVIDER NOTES
Triage Chief Complaint:   Headache and Chest Pain    [place]:  Harsha Liu is a 53 y.o. male that presents with concern regarding alcohol withdrawal.  Patient reports that he has been on a chowdary lately and is disappointed in himself because he has been 90 days sober following admission here this past spring.  Patient reports he feels dehydrated and is nauseous has a headache and has some chest pain.  Some associated abdominal pain but patient reports that is baseline for himself.  Patient does report his last drink was last night and he feels like he is getting sick when he is drinking.  Patient denies other substances of use disorder.  Despite drinking last night patient reports that he feels like he is already going into withdrawal and feels anxious and tremulous.    Patient denies any known cardiac disease.  Patient had echocardiogram this most recent hospitalization which was reassuring.  Patient was diagnosed with possible pericarditis and placed on anti-inflammatories for this.    ROS:  General:  No fevers, no chills, no weakness  Eyes:  No recent vison changes, no discharge  ENT:  No sore throat, no nasal congestion, no hearing changes  Cardiovascular:  + chest pain, no palpitations  Respiratory:  No shortness of breath, no cough, no wheezing  Gastrointestinal:  No pain, + nausea, + vomiting, no diarrhea  Musculoskeletal:  No muscle pain, no joint pain  Skin:  No rash, no pruritis, no easy bruising  Neurologic:  No speech problems, + headache, no extremity numbness, no extremity tingling, no extremity weakness  Psychiatric:  No anxiety  Genitourinary:  No dysuria, no hematuria  Endocrine:  No unexpected weight gain, no unexpected weight loss  Extremities:  no edema, no pain    Past Medical History:   Diagnosis Date    Anxiety     Hernia, inguinal, left     Liver disease 3/16/23     Past Surgical History:   Procedure Laterality Date    CYST REMOVAL Left     LEFT ARM    HERNIA REPAIR      left inguinal  danuta- Dr. Vanegas      Family History   Problem Relation Age of Onset    Arthritis Mother     Breast Cancer Mother     Alcohol Abuse Father     Heart Attack Father     Heart Disease Father      Social History     Socioeconomic History    Marital status:      Spouse name: Not on file    Number of children: Not on file    Years of education: Not on file    Highest education level: Not on file   Occupational History    Not on file   Tobacco Use    Smoking status: Former     Current packs/day: 0.00     Average packs/day: 0.5 packs/day for 18.0 years (9.0 ttl pk-yrs)     Types: Cigarettes     Start date: 1988     Quit date: 2006     Years since quittin.5    Smokeless tobacco: Never   Substance and Sexual Activity    Alcohol use: Not Currently     Comment: few beers a week     Drug use: Never    Sexual activity: Yes     Partners: Female     Comment:    Other Topics Concern    Not on file   Social History Narrative    Not on file     Social Determinants of Health     Financial Resource Strain: Low Risk  (3/21/2024)    Overall Financial Resource Strain (CARDIA)     Difficulty of Paying Living Expenses: Not hard at all   Food Insecurity: No Food Insecurity (3/21/2024)    Hunger Vital Sign     Worried About Running Out of Food in the Last Year: Never true     Ran Out of Food in the Last Year: Never true   Transportation Needs: No Transportation Needs (3/21/2024)    PRAPARE - Transportation     Lack of Transportation (Medical): No     Lack of Transportation (Non-Medical): No   Physical Activity: Not on file   Stress: Not on file   Social Connections: Not on file   Intimate Partner Violence: Not on file   Housing Stability: Low Risk  (3/21/2024)    Housing Stability Vital Sign     Unable to Pay for Housing in the Last Year: No     Number of Places Lived in the Last Year: 1     Unstable Housing in the Last Year: No     Current Facility-Administered Medications   Medication Dose Route Frequency

## 2024-07-18 NOTE — DISCHARGE INSTRUCTIONS
Happy birthday!!! Make sure to follow up with the outpatient rehab and also with your primary care provider.     Substance Abuse Resources    [facility]   [phone] or [phone]   [address]  Intensive Outpatient and Outpatient treatment for both men & women    [facility]   [phone]                         [address] [facility]   Intensive outpatient and residential for men & Intensive outpatient for women     [facility]      [phone]                      [address] [facility]  Comprehensive Outpatient Addiction Treatment    Outpatient treatment for both men & women:   Call for insurance eligibility vs cost  [facility] [phone]  [facility] [phone]  [facility] [phone]  [facility] [phone]    Drug and Alcohol Treatment Facilities:   Call for insurance eligibility vs cost  [place] [phone]  [facility] [phone]  [facility] [phone]  [facility] [phone]  [facility] [phone]  [facility] [phone]  [place] [facility] [phone]  [facility] [phone]  [facility] [phone]  [facility] [phone]  [facility] [phone]    Alcoholics Anonymous/ ALANON/ALATEEN [phone] or [phone]  https://cogf.meetingfor.me/meetings or www.aacentral[place].org      Narcotics Anonymous [phone] or [phone]   http://sascna.org/ or www.nacentral[place].org  Cocaine Anonymous [phone] www.ca[place].org   Marijuana Anonymous [phone] www.marijuana-anonymous.org     Mental Health Crisis Line: 391-2259  [facility]: [phone]  [place] Quit Line: (smoking) https://[place].quitlogix.org 800-QUIT-NOW (584-517-6177)  24/7 crisis line for [facility]: [phone]  24-hour crisis text hotline: Text the word \"4hope\" to 628-855 for crisis support    Information & Referral for [facility]  Referral line to  [place] [address]  [phone]   **[address]  [place], [place] [address]  [phone]  **[address], [place] [address]  [phone]    [facility]  [address], [place] [address] [phone]  zohreh@Works.io.[employer]    [facility]   330.743.HOPE  [address] [place] [address]  [phone]    [facility] [address]  [address]   [place] [address]   [phone]  www.therefuge[place].org    [facility]  [address] [place] [address]  [phone]  Fax [phone]    [place] [facility]  [address] [place] [address]  [phone]  Fax: [phone]    [facility]  [address] [place] [address] [place] [address]  [phone]  Fax [phone]    [facility]  [address] [place] [phone]    [facility] [address] [facility] [address]   [place], [place] [address] [phone]  Fax: [phone]    [address] [address]  Phone : [phone]    [facility]  [address], [place] [address]   [phone]  Toll [phone]    [facility] [place]  [address] [place] [address]    [phone]    [place] [address] [place], [place]  [phone] ext. [phone]    [facility]  [address] [place] [address]  Phone: [phone]    [facility] /various locations   [facility]  [address]   [place], [place] [address] [phone]   [place] [address] [place] [address]  [phone]   [facility]  [address]   [facility] [place] [address] [phone]  [facility]  [address] [place] [address]  [phone]  [address] [phone] [phone]  [address] [address] [phone]    [facility] (various locations)   One intake #

## 2024-07-19 LAB
ALBUMIN SERPL-MCNC: 3.2 GM/DL (ref 3.4–5)
ALP BLD-CCNC: 134 IU/L (ref 40–129)
ALT SERPL-CCNC: 426 U/L (ref 10–40)
ANION GAP SERPL CALCULATED.3IONS-SCNC: 10 MMOL/L (ref 7–16)
AST SERPL-CCNC: 469 IU/L (ref 15–37)
BILIRUB SERPL-MCNC: 1.4 MG/DL (ref 0–1)
BUN SERPL-MCNC: 12 MG/DL (ref 6–23)
CALCIUM SERPL-MCNC: 8.6 MG/DL (ref 8.3–10.6)
CHLORIDE BLD-SCNC: 104 MMOL/L (ref 99–110)
CO2: 25 MMOL/L (ref 21–32)
CREAT SERPL-MCNC: 0.8 MG/DL (ref 0.9–1.3)
EKG ATRIAL RATE: 113 BPM
EKG DIAGNOSIS: NORMAL
EKG P AXIS: 74 DEGREES
EKG P-R INTERVAL: 146 MS
EKG Q-T INTERVAL: 334 MS
EKG QRS DURATION: 86 MS
EKG QTC CALCULATION (BAZETT): 458 MS
EKG R AXIS: 65 DEGREES
EKG T AXIS: 69 DEGREES
EKG VENTRICULAR RATE: 113 BPM
GFR, ESTIMATED: >90 ML/MIN/1.73M2
GLUCOSE SERPL-MCNC: 139 MG/DL (ref 70–99)
POTASSIUM SERPL-SCNC: 3.8 MMOL/L (ref 3.5–5.1)
SODIUM BLD-SCNC: 139 MMOL/L (ref 135–145)
TOTAL PROTEIN: 5.5 GM/DL (ref 6.4–8.2)

## 2024-07-19 PROCEDURE — 2140000000 HC CCU INTERMEDIATE R&B

## 2024-07-19 PROCEDURE — 94761 N-INVAS EAR/PLS OXIMETRY MLT: CPT

## 2024-07-19 PROCEDURE — 36415 COLL VENOUS BLD VENIPUNCTURE: CPT

## 2024-07-19 PROCEDURE — 6370000000 HC RX 637 (ALT 250 FOR IP): Performed by: INTERNAL MEDICINE

## 2024-07-19 PROCEDURE — 6360000002 HC RX W HCPCS: Performed by: INTERNAL MEDICINE

## 2024-07-19 PROCEDURE — 80053 COMPREHEN METABOLIC PANEL: CPT

## 2024-07-19 PROCEDURE — 93010 ELECTROCARDIOGRAM REPORT: CPT | Performed by: INTERNAL MEDICINE

## 2024-07-19 PROCEDURE — 2580000003 HC RX 258: Performed by: INTERNAL MEDICINE

## 2024-07-19 RX ADMIN — PHENOBARBITAL 32.4 MG: 32.4 TABLET ORAL at 08:30

## 2024-07-19 RX ADMIN — SODIUM CHLORIDE, PRESERVATIVE FREE 10 ML: 5 INJECTION INTRAVENOUS at 08:32

## 2024-07-19 RX ADMIN — Medication 100 MG: at 08:30

## 2024-07-19 RX ADMIN — SODIUM CHLORIDE: 9 INJECTION, SOLUTION INTRAVENOUS at 00:21

## 2024-07-19 RX ADMIN — PHENOBARBITAL 32.4 MG: 32.4 TABLET ORAL at 17:46

## 2024-07-19 RX ADMIN — PHENOBARBITAL 32.4 MG: 32.4 TABLET ORAL at 13:06

## 2024-07-19 RX ADMIN — PHENOBARBITAL 32.4 MG: 32.4 TABLET ORAL at 20:43

## 2024-07-19 RX ADMIN — ENOXAPARIN SODIUM 30 MG: 100 INJECTION SUBCUTANEOUS at 20:43

## 2024-07-19 RX ADMIN — SODIUM CHLORIDE, PRESERVATIVE FREE 10 ML: 5 INJECTION INTRAVENOUS at 20:45

## 2024-07-19 RX ADMIN — PAROXETINE HYDROCHLORIDE 20 MG: 20 TABLET, FILM COATED ORAL at 08:30

## 2024-07-19 RX ADMIN — NADOLOL 20 MG: 20 TABLET ORAL at 08:36

## 2024-07-19 RX ADMIN — ATORVASTATIN CALCIUM 40 MG: 40 TABLET, FILM COATED ORAL at 20:43

## 2024-07-19 RX ADMIN — SODIUM CHLORIDE: 9 INJECTION, SOLUTION INTRAVENOUS at 12:23

## 2024-07-19 RX ADMIN — SODIUM CHLORIDE: 9 INJECTION, SOLUTION INTRAVENOUS at 20:49

## 2024-07-19 RX ADMIN — ENOXAPARIN SODIUM 30 MG: 100 INJECTION SUBCUTANEOUS at 08:30

## 2024-07-19 RX ADMIN — ONDANSETRON 4 MG: 2 INJECTION INTRAMUSCULAR; INTRAVENOUS at 08:36

## 2024-07-19 RX ADMIN — FOLIC ACID 1 MG: 1 TABLET ORAL at 08:30

## 2024-07-19 ASSESSMENT — PAIN DESCRIPTION - DESCRIPTORS: DESCRIPTORS: ACHING;DULL

## 2024-07-19 ASSESSMENT — PAIN - FUNCTIONAL ASSESSMENT: PAIN_FUNCTIONAL_ASSESSMENT: ACTIVITIES ARE NOT PREVENTED

## 2024-07-19 ASSESSMENT — PAIN DESCRIPTION - LOCATION: LOCATION: HEAD

## 2024-07-19 ASSESSMENT — PAIN DESCRIPTION - PAIN TYPE: TYPE: ACUTE PAIN

## 2024-07-19 ASSESSMENT — PAIN SCALES - GENERAL: PAINLEVEL_OUTOF10: 4

## 2024-07-19 NOTE — PLAN OF CARE
Problem: Discharge Planning  Goal: Discharge to home or other facility with appropriate resources  7/19/2024 0109 by El Fenton RN  Flowsheets (Taken 7/19/2024 0109)  Discharge to home or other facility with appropriate resources:   Arrange for needed discharge resources and transportation as appropriate   Identify barriers to discharge with patient and caregiver  7/19/2024 0107 by El Fenton RN  Flowsheets (Taken 7/19/2024 0107)  Discharge to home or other facility with appropriate resources:   Arrange for needed discharge resources and transportation as appropriate   Identify barriers to discharge with patient and caregiver     Problem: Safety - Adult  Goal: Free from fall injury  Flowsheets (Taken 7/19/2024 0109)  Free From Fall Injury:   Based on caregiver fall risk screen, instruct family/caregiver to ask for assistance with transferring infant if caregiver noted to have fall risk factors   Instruct family/caregiver on patient safety     Problem: Chronic Conditions and Co-morbidities  Goal: Patient's chronic conditions and co-morbidity symptoms are monitored and maintained or improved  Flowsheets (Taken 7/19/2024 0109)  Care Plan - Patient's Chronic Conditions and Co-Morbidity Symptoms are Monitored and Maintained or Improved:   Monitor and assess patient's chronic conditions and comorbid symptoms for stability, deterioration, or improvement   Collaborate with multidisciplinary team to address chronic and comorbid conditions and prevent exacerbation or deterioration   Update acute care plan with appropriate goals if chronic or comorbid symptoms are exacerbated and prevent overall improvement and discharge

## 2024-07-19 NOTE — PROGRESS NOTES
4 Eyes Skin Assessment     NAME:  Harsha Liu  YOB: 1970  MEDICAL RECORD NUMBER:  [id number]    The patient is being assessed for  Admission    I agree that at least one RN has performed a thorough Head to Toe Skin Assessment on the patient. ALL assessment sites listed below have been assessed.      Areas assessed by both nurses:    Head, Face, Ears, Shoulders, Back, Chest, Arms, Elbows, Hands, Sacrum. Buttock, Coccyx, Ischium, Legs. Feet and Heels, and Under Medical Devices         Does the Patient have a Wound? No noted wound(s)       Adrian Prevention initiated by RN: Yes  Wound Care Orders initiated by RN: No    Pressure Injury (Stage 3,4, Unstageable, DTI, NWPT, and Complex wounds) if present, place Wound referral order by RN under : No    New Ostomies, if present place, Ostomy referral order under : No     Nurse 1 eSignature: Electronically signed by El Fenton RN on 7/19/24 at 5:58 AM EDT    **SHARE this note so that the co-signing nurse can place an eSignature**    Nurse 2 eSignature: Electronically signed by Mary Duffy RN on 7/19/24 at 6:11 AM EDT

## 2024-07-19 NOTE — PROGRESS NOTES
V2.0  Progress Note      Name:  Harsha Liu /Age/Sex: 1970  (53 y.o. male)   MRN & CSN:  [id number] & [id number] Encounter Date/Time: 2024 12:26 PM EDT   Location:  [facility]- PCP: John Blue MD       Hospital Day: 2    Assessment and Plan:   Harsha Liu is a 53 y.o. male who presents with Alcohol withdrawal syndrome, uncomplicated (HCC)    Hospital Problems             Last Modified POA    * (Principal) Alcohol withdrawal syndrome, uncomplicated (HCC) 2024 Yes     Plan:  Acute Alcohol Withdrawal: Continue Phenobarbital taper, thiamine, IVF, rescue phenobarbital also ordered, CM consulted for assistance with potential rehab placement per patient request  Alcoholic Cirrhosis with Varices: LFT's improved from prior check, repeat in AM, home Nadolol    Disposition:   Current Living situation: home  Expected Disposition: home  Estimated D/C: 3 days    Diet ADULT DIET; Regular   DVT Prophylaxis [x] Lovenox, []  Heparin, [] SCDs, [] Ambulation,  [] Eliquis, [] Xarelto, [] Coumadin   Code Status Full Code   Surrogate Decision Maker/ POA      Personally reviewed Lab Studies and Imaging     Discussed management of the case with ER physician who recommends admission and I agree    EKG interpreted personally and results sinus tachycardia    Imaging that was interpreted personally includes CXR with no acute pathology, no opacities or consolidations        History from:     patient    History of Present Illness:     Chief Complaint: Tremors    Pt feels well today. No signs of breakthrough withdrawal at present.     Review of Systems:        Pertinent positives and negatives discussed in HPI     Objective:     Intake/Output Summary (Last 24 hours) at 2024 1434  Last data filed at 2024 2102  Gross per 24 hour   Intake 5 ml   Output --   Net 5 ml        Vitals:   Vitals:    24 0010 24 0438 24 0826 24 1150   BP:  131/80 129/80 133/76   Pulse: 64 57 74 67  11:00 PM    BLOODU NEGATIVE 11/15/2021 11:00 PM    GLUCOSEU NEGATIVE 11/15/2021 11:00 PM    KETUA NEGATIVE 11/15/2021 11:00 PM     Urine Cultures: No results found for: \"LABURIN\"  Blood Cultures: No results found for: \"BC\"  No results found for: \"BLOODCULT2\"  Organism: No results found for: \"ORG\"    Imaging/Diagnostics Last 24 Hours   XR CHEST PORTABLE    Result Date: 7/18/2024  Chest X-ray INDICATION: Chest pain COMPARISON: X-ray chest March 11, 2024 TECHNIQUE: Frontal view of the chest was obtained. FINDINGS: The lungs are clear. There are no infiltrates or effusions.  The heart size is normal.  The bony thorax is intact.      No acute findings in the chest Electronically signed by STEVE DORAN        Electronically signed by Rea Anderson MD on 7/19/2024 at 2:34 PM

## 2024-07-19 NOTE — CARE COORDINATION
Chart reviewed. Consult for alcohol community rehab resources. Resources have been placed on pt's AVS. Plan remains home with no needs.

## 2024-07-20 LAB
ALBUMIN SERPL-MCNC: 3.6 GM/DL (ref 3.4–5)
ALP BLD-CCNC: 128 IU/L (ref 40–128)
ALT SERPL-CCNC: 257 U/L (ref 10–40)
ANION GAP SERPL CALCULATED.3IONS-SCNC: 9 MMOL/L (ref 7–16)
AST SERPL-CCNC: 205 IU/L (ref 15–37)
BASOPHILS ABSOLUTE: 0 K/CU MM
BASOPHILS RELATIVE PERCENT: 0.9 % (ref 0–1)
BILIRUB SERPL-MCNC: 0.8 MG/DL (ref 0–1)
BUN SERPL-MCNC: 6 MG/DL (ref 6–23)
CALCIUM SERPL-MCNC: 8.3 MG/DL (ref 8.3–10.6)
CHLORIDE BLD-SCNC: 105 MMOL/L (ref 99–110)
CO2: 26 MMOL/L (ref 21–32)
CREAT SERPL-MCNC: 0.7 MG/DL (ref 0.9–1.3)
DIFFERENTIAL TYPE: ABNORMAL
EOSINOPHILS ABSOLUTE: 0.3 K/CU MM
EOSINOPHILS RELATIVE PERCENT: 5.4 % (ref 0–3)
GFR, ESTIMATED: >90 ML/MIN/1.73M2
GLUCOSE SERPL-MCNC: 114 MG/DL (ref 70–99)
HCT VFR BLD CALC: 42.8 % (ref 42–52)
HEMOGLOBIN: 14.5 GM/DL (ref 13.5–18)
IMMATURE NEUTROPHIL %: 0.2 % (ref 0–0.43)
LYMPHOCYTES ABSOLUTE: 1.5 K/CU MM
LYMPHOCYTES RELATIVE PERCENT: 32.5 % (ref 24–44)
MCH RBC QN AUTO: 28.9 PG (ref 27–31)
MCHC RBC AUTO-ENTMCNC: 33.9 % (ref 32–36)
MCV RBC AUTO: 85.4 FL (ref 78–100)
MONOCYTES ABSOLUTE: 0.5 K/CU MM
MONOCYTES RELATIVE PERCENT: 10.7 % (ref 0–4)
NEUTROPHILS ABSOLUTE: 2.3 K/CU MM
NEUTROPHILS RELATIVE PERCENT: 50.3 % (ref 36–66)
NUCLEATED RBC %: 0 %
PDW BLD-RTO: 13.4 % (ref 11.7–14.9)
PLATELET # BLD: 95 K/CU MM (ref 140–440)
PMV BLD AUTO: 11.4 FL (ref 7.5–11.1)
POTASSIUM SERPL-SCNC: 3.9 MMOL/L (ref 3.5–5.1)
RBC # BLD: 5.01 M/CU MM (ref 4.6–6.2)
SODIUM BLD-SCNC: 140 MMOL/L (ref 135–145)
TOTAL IMMATURE NEUTOROPHIL: 0.01 K/CU MM
TOTAL NUCLEATED RBC: 0 K/CU MM
TOTAL PROTEIN: 6.3 GM/DL (ref 6.4–8.2)
WBC # BLD: 4.6 K/CU MM (ref 4–10.5)

## 2024-07-20 PROCEDURE — 2140000000 HC CCU INTERMEDIATE R&B

## 2024-07-20 PROCEDURE — 6370000000 HC RX 637 (ALT 250 FOR IP): Performed by: INTERNAL MEDICINE

## 2024-07-20 PROCEDURE — 36415 COLL VENOUS BLD VENIPUNCTURE: CPT

## 2024-07-20 PROCEDURE — 94761 N-INVAS EAR/PLS OXIMETRY MLT: CPT

## 2024-07-20 PROCEDURE — 85025 COMPLETE CBC W/AUTO DIFF WBC: CPT

## 2024-07-20 PROCEDURE — 2580000003 HC RX 258: Performed by: INTERNAL MEDICINE

## 2024-07-20 PROCEDURE — 80053 COMPREHEN METABOLIC PANEL: CPT

## 2024-07-20 PROCEDURE — 6360000002 HC RX W HCPCS: Performed by: INTERNAL MEDICINE

## 2024-07-20 RX ADMIN — PAROXETINE HYDROCHLORIDE 20 MG: 20 TABLET, FILM COATED ORAL at 09:04

## 2024-07-20 RX ADMIN — SODIUM CHLORIDE, PRESERVATIVE FREE 10 ML: 5 INJECTION INTRAVENOUS at 09:05

## 2024-07-20 RX ADMIN — PHENOBARBITAL 32.4 MG: 32.4 TABLET ORAL at 09:04

## 2024-07-20 RX ADMIN — PHENOBARBITAL 16.2 MG: 32.4 TABLET ORAL at 20:26

## 2024-07-20 RX ADMIN — NADOLOL 20 MG: 20 TABLET ORAL at 09:04

## 2024-07-20 RX ADMIN — SODIUM CHLORIDE: 9 INJECTION, SOLUTION INTRAVENOUS at 16:28

## 2024-07-20 RX ADMIN — SODIUM CHLORIDE: 9 INJECTION, SOLUTION INTRAVENOUS at 05:39

## 2024-07-20 RX ADMIN — ENOXAPARIN SODIUM 30 MG: 100 INJECTION SUBCUTANEOUS at 09:04

## 2024-07-20 RX ADMIN — FOLIC ACID 1 MG: 1 TABLET ORAL at 09:04

## 2024-07-20 RX ADMIN — Medication 100 MG: at 09:04

## 2024-07-20 NOTE — DISCHARGE INSTR - COC
Continuity of Care Form    Patient Name: Harsha Liu   :  1970  MRN:  [id number]    Admit date:  2024  Discharge date:  ***    Code Status Order: Full Code   Advance Directives:     Admitting Physician:  Lorenzo Novoa MD  PCP: John Blue MD    Discharging Nurse: ***  Discharging Hospital Unit/Room#: 3109/3109-A  Discharging Unit Phone Number: ***    Emergency Contact:   Extended Emergency Contact Information  Primary Emergency Contact: Mecca Hyman  Address: [address] [place] [address]  Home Phone: [phone]  Mobile Phone: [phone]  Relation: Spouse    Past Surgical History:  Past Surgical History:   Procedure Laterality Date    CYST REMOVAL Left     LEFT ARM    HERNIA REPAIR      left inguinal hernia- Dr. Vanegas        Immunization History:   Immunization History   Administered Date(s) Administered    COVID-19, PFIZER PURPLE top, DILUTE for use, (age 12 y+), 30mcg/0.3mL 2021, 2021       Active Problems:  Patient Active Problem List   Diagnosis Code    Inguinal hernia of left side without obstruction or gangrene K40.90    S/P left inguinal hernia repair Z98.890, Z87.19    Varicose veins of left lower extremity with other complications I83.892    Varicose veins of lower extremities with complications, bilateral I83.893    Portal hypertension (HCC) K76.6    Esophageal varices (HCC) I85.00    Dysthymia F34.1    Chronic bilateral low back pain without sciatica M54.50, G89.29    Hip pain, right M25.551    Chest pain, rule out acute myocardial infarction R07.9    Elevated LFTs R79.89    History of ETOH abuse F10.11    Alcoholic cirrhosis of liver (HCC) K70.30    Mixed hyperlipidemia E78.2    Alcohol withdrawal syndrome, uncomplicated (HCC) F10.930       Isolation/Infection:   Isolation            No Isolation          Patient Infection Status       None to display            Nurse Assessment:  Last Vital Signs: /71

## 2024-07-20 NOTE — CARE COORDINATION
Pt requesting information on where to get the Vivitrol injection.  CM provided information on [facility] and [facility].  Fqacilities added to [facility]

## 2024-07-20 NOTE — PROGRESS NOTES
V2.0  Progress Note      Name:  Harsha Liu /Age/Sex: 1970  (53 y.o. male)   MRN & CSN:  [id number] & [id number] Encounter Date/Time: 2024 12:26 PM EDT   Location:  [facility]-A PCP: John Blue MD       Hospital Day: 3    Assessment and Plan:   Harsha Liu is a 53 y.o. male who presents with Alcohol withdrawal syndrome, uncomplicated (HCC)    Hospital Problems             Last Modified POA    * (Principal) Alcohol withdrawal syndrome, uncomplicated (HCC) 2024 Yes     Plan:    Acute Alcohol Withdrawal: Continue Phenobarbital taper, thiamine, IVF, rescue phenobarbital also ordered, CM consulted for assistance with potential rehab placement per patient request  Alcoholic Cirrhosis with Varices: LFT's improved from prior check, repeat in AM, home Nadolol    Disposition:   Current Living situation: home  Expected Disposition: home  Estimated D/C:     Diet ADULT DIET; Regular   DVT Prophylaxis [x] Lovenox, []  Heparin, [] SCDs, [] Ambulation,  [] Eliquis, [] Xarelto, [] Coumadin   Code Status Full Code   Surrogate Decision Maker/ POA      Personally reviewed Lab Studies and Imaging     Discussed management of the case with ER physician who recommends admission and I agree    EKG interpreted personally and results sinus tachycardia    Imaging that was interpreted personally includes CXR with no acute pathology, no opacities or consolidations        History from:     patient    History of Present Illness:     Chief Complaint: Tremors    Pt feels well today. No signs of breakthrough withdrawal at present.     Review of Systems:        Pertinent positives and negatives discussed in HPI     Objective:     Intake/Output Summary (Last 24 hours) at 2024 1428  Last data filed at 2024 0856  Gross per 24 hour   Intake 360 ml   Output 2125 ml   Net -1765 ml        Vitals:   Vitals:    24 0336 24 0337 24 0856 24 1155   BP: 126/67  (!) 140/88 124/71   Pulse:

## 2024-07-21 VITALS
OXYGEN SATURATION: 97 % | SYSTOLIC BLOOD PRESSURE: 117 MMHG | RESPIRATION RATE: 24 BRPM | HEIGHT: 72 IN | BODY MASS INDEX: 34.55 KG/M2 | DIASTOLIC BLOOD PRESSURE: 77 MMHG | WEIGHT: 255.07 LBS | TEMPERATURE: 98 F | HEART RATE: 105 BPM

## 2024-07-21 LAB
ALBUMIN SERPL-MCNC: 3.5 GM/DL (ref 3.4–5)
ALP BLD-CCNC: 115 IU/L (ref 40–128)
ALT SERPL-CCNC: 198 U/L (ref 10–40)
ANION GAP SERPL CALCULATED.3IONS-SCNC: 9 MMOL/L (ref 7–16)
AST SERPL-CCNC: 134 IU/L (ref 15–37)
BASOPHILS ABSOLUTE: 0 K/CU MM
BASOPHILS RELATIVE PERCENT: 0.8 % (ref 0–1)
BILIRUB SERPL-MCNC: 0.6 MG/DL (ref 0–1)
BUN SERPL-MCNC: 6 MG/DL (ref 6–23)
CALCIUM SERPL-MCNC: 8.9 MG/DL (ref 8.3–10.6)
CHLORIDE BLD-SCNC: 109 MMOL/L (ref 99–110)
CO2: 27 MMOL/L (ref 21–32)
CREAT SERPL-MCNC: 0.8 MG/DL (ref 0.9–1.3)
DIFFERENTIAL TYPE: ABNORMAL
EOSINOPHILS ABSOLUTE: 0.3 K/CU MM
EOSINOPHILS RELATIVE PERCENT: 5.6 % (ref 0–3)
GFR, ESTIMATED: >90 ML/MIN/1.73M2
GLUCOSE SERPL-MCNC: 116 MG/DL (ref 70–99)
HCT VFR BLD CALC: 42.8 % (ref 42–52)
HEMOGLOBIN: 14.3 GM/DL (ref 13.5–18)
IMMATURE NEUTROPHIL %: 0.4 % (ref 0–0.43)
LYMPHOCYTES ABSOLUTE: 1.9 K/CU MM
LYMPHOCYTES RELATIVE PERCENT: 40 % (ref 24–44)
MCH RBC QN AUTO: 28.6 PG (ref 27–31)
MCHC RBC AUTO-ENTMCNC: 33.4 % (ref 32–36)
MCV RBC AUTO: 85.6 FL (ref 78–100)
MONOCYTES ABSOLUTE: 0.5 K/CU MM
MONOCYTES RELATIVE PERCENT: 9.8 % (ref 0–4)
NEUTROPHILS ABSOLUTE: 2.1 K/CU MM
NEUTROPHILS RELATIVE PERCENT: 43.4 % (ref 36–66)
NUCLEATED RBC %: 0 %
PDW BLD-RTO: 13.5 % (ref 11.7–14.9)
PLATELET # BLD: 79 K/CU MM (ref 140–440)
PMV BLD AUTO: 11.3 FL (ref 7.5–11.1)
POTASSIUM SERPL-SCNC: 4.6 MMOL/L (ref 3.5–5.1)
RBC # BLD: 5 M/CU MM (ref 4.6–6.2)
SODIUM BLD-SCNC: 145 MMOL/L (ref 135–145)
TOTAL IMMATURE NEUTOROPHIL: 0.02 K/CU MM
TOTAL NUCLEATED RBC: 0 K/CU MM
TOTAL PROTEIN: 6.2 GM/DL (ref 6.4–8.2)
WBC # BLD: 4.8 K/CU MM (ref 4–10.5)

## 2024-07-21 PROCEDURE — 94761 N-INVAS EAR/PLS OXIMETRY MLT: CPT

## 2024-07-21 PROCEDURE — 6370000000 HC RX 637 (ALT 250 FOR IP): Performed by: INTERNAL MEDICINE

## 2024-07-21 PROCEDURE — 85025 COMPLETE CBC W/AUTO DIFF WBC: CPT

## 2024-07-21 PROCEDURE — 80053 COMPREHEN METABOLIC PANEL: CPT

## 2024-07-21 PROCEDURE — 36415 COLL VENOUS BLD VENIPUNCTURE: CPT

## 2024-07-21 RX ADMIN — FOLIC ACID 1 MG: 1 TABLET ORAL at 08:41

## 2024-07-21 RX ADMIN — PHENOBARBITAL 16.2 MG: 32.4 TABLET ORAL at 08:42

## 2024-07-21 RX ADMIN — Medication 100 MG: at 08:42

## 2024-07-21 RX ADMIN — PAROXETINE HYDROCHLORIDE 20 MG: 20 TABLET, FILM COATED ORAL at 08:42

## 2024-07-21 RX ADMIN — NADOLOL 20 MG: 20 TABLET ORAL at 08:46

## 2024-07-23 ENCOUNTER — TELEPHONE (OUTPATIENT)
Dept: FAMILY MEDICINE CLINIC | Age: 54
End: 2024-07-23

## 2024-07-23 NOTE — TELEPHONE ENCOUNTER
Care Transitions Initial Follow Up Call    Outreach made within 2 business days of discharge: Yes    Patient: Harsha Liu Patient : 1970   MRN: [id number]  Reason for Admission: etoh withdrawal  Discharge Date: 24       Spoke with: .lmtrc to pt

## 2024-07-24 ENCOUNTER — TELEPHONE (OUTPATIENT)
Dept: GASTROENTEROLOGY | Age: 54
End: 2024-07-24

## 2024-07-24 NOTE — TELEPHONE ENCOUNTER
Care Transitions Initial Follow Up Call    Outreach made within 2 business days of discharge: Yes    Patient: Harsha Liu Patient : 1970   MRN: [id number]  Reason for Admission: etoh abuse  Discharge Date: 24       Spoke with: pt    Discharge department/facility: [facility] Interactive Patient Contact:  Was patient able to fill all prescriptions: Yes  Was patient instructed to bring all medications to the follow-up visit: Yes  Is patient taking all medications as directed in the discharge summary? Yes  Does patient understand their discharge instructions: Yes  Does patient have questions or concerns that need addressed prior to 7-14 day follow up office visit: no    Additional needs identified to be addressed with provider  No needs identified           Pt declined appt. Following up with etoh tx center  Scheduled appointment with PCP within 7-14 days    Follow Up  Future Appointments   Date Time Provider Department Center   10/2/2024  4:00 PM John Blue MD [facility]       Ayesha Deluca LPN

## 2024-07-24 NOTE — TELEPHONE ENCOUNTER
GRETA on 7/16/24 for patient to take care of changing his PCP to Leeanne with [facility].     Spoke to patient today and he said he missed my message. He will call [facility] and get his PCP changed to Leeanne. I will then be able to resubmit the prior auth for his upcoming procedures on 8/5/24.

## 2024-07-25 ENCOUNTER — TELEPHONE (OUTPATIENT)
Dept: GASTROENTEROLOGY | Age: 54
End: 2024-07-25

## 2024-07-25 RX ORDER — M-VIT,TX,IRON,MINS/CALC/FOLIC 27MG-0.4MG
1 TABLET ORAL DAILY
COMMUNITY

## 2024-07-25 RX ORDER — THIAMINE MONONITRATE (VIT B1) 100 MG
100 TABLET ORAL DAILY
COMMUNITY

## 2024-07-25 NOTE — PROGRESS NOTES
Surgery @ [facility] on 08/05/24 you will be called 08/02/24 with times    NOTHING TO EAT OR DRINK AFTER MIDNIGHT DAY OF SURGERY    1. Enter thru the hospital main entrance on day of surgery, check in at the Information Desk. If you arrive prior to 6:00am, enter thru the ER entrance.    2. Follow the directions as prescribed by the doctor for your procedure and medications.         Morning of surgery take: Nadolol         Stop vitamins, supplements and NSAIDS:      3. Check with your Doctor regarding stopping blood thinners and follow their instructions.    4. Do not smoke, vape or use chewing tobacco morning of surgery. Do not drink any alcoholic beverages 24 hours prior to surgery.       This includes NA Beer. No street drugs 7 days prior to surgery.    5. If you have dentures, contacts of glasses they will be removed before going to the OR; please bring a case.    6. Please bring picture ID, insurance card, paperwork from the doctor’s office (H & P, Consent, & card for implantable devices).    7. Take a shower with an antibacterial soap the night before surgery and the morning of surgery. Do not put anything on your skin      After your morning shower.    8. You will need a responsible adult to drive you home and check on you after surgery.

## 2024-07-30 ENCOUNTER — TELEPHONE (OUTPATIENT)
Dept: GASTROENTEROLOGY | Age: 54
End: 2024-07-30

## 2024-08-02 ENCOUNTER — ANESTHESIA EVENT (OUTPATIENT)
Dept: ENDOSCOPY | Age: 54
End: 2024-08-02
Payer: COMMERCIAL

## 2024-08-02 PROBLEM — Z12.11 SCREEN FOR COLON CANCER: Status: ACTIVE | Noted: 2024-06-05

## 2024-08-02 NOTE — ANESTHESIA PRE PROCEDURE
06:33 PM    LABGLOM 107 2023 12:00 AM    GLUCOSE 116 2024 01:51 AM    CALCIUM 8.9 2024 01:51 AM    BILITOT 0.6 2024 01:51 AM    ALKPHOS 115 2024 01:51 AM     2024 01:51 AM     2024 01:51 AM       POC Tests: No results for input(s): \"POCGLU\", \"POCNA\", \"POCK\", \"POCCL\", \"POCBUN\", \"POCHEMO\", \"POCHCT\" in the last 72 hours.    Coags:   Lab Results   Component Value Date/Time    PROTIME 13.0 2024 07:54 AM    INR 1.0 2024 07:54 AM       HCG (If Applicable): No results found for: \"PREGTESTUR\", \"PREGSERUM\", \"HCG\", \"HCGQUANT\"     ABGs: No results found for: \"PHART\", \"PO2ART\", \"DAT0IDN\", \"CQZ3CMP\", \"BEART\", \"Q5QBKYCV\"     Type & Screen (If Applicable):  No results found for: \"LABABO\"    Drug/Infectious Status (If Applicable):  Lab Results   Component Value Date/Time    HEPCAB NON REACTIVE 2024 03:50 AM       COVID-19 Screening (If Applicable): No results found for: \"COVID19\"        Anesthesia Evaluation  Patient summary reviewed   no history of anesthetic complications:   Airway: Mallampati: II          Dental:          Pulmonary:                             ROS comment: Smoking Status: Former - 9 pack years  Quit Smokin06       Cardiovascular:    (+) hyperlipidemia               ROS comment: Echo 3/2024:  ·  Left Ventricle: Normal left ventricular systolic function with a visually estimated EF of 55%. Left ventricle size is normal. Mildly increased wall thickness. Normal wall motion. Normal diastolic function.  ·  Aortic Valve: Sclerotic, but non-stenotic aortic valve; large calcification noted on non-coronary cusp.  ·  Pericardium: There is evidence of epicardial fat. No pericardial effusion.       Neuro/Psych:   (+) depression/anxiety             GI/Hepatic/Renal:   (+) liver disease: esophageal varices, bowel prep          Endo/Other:    (+) blood dyscrasia: thrombocytopenia:..                 Abdominal:             Vascular: negative

## 2024-08-02 NOTE — PROGRESS NOTES
Left message for patient to arrive at 0815 at [facility] on 8/5/2024 for his procedure at 0945. Orders are in [facility].

## 2024-08-05 ENCOUNTER — ANESTHESIA (OUTPATIENT)
Dept: ENDOSCOPY | Age: 54
End: 2024-08-05
Payer: COMMERCIAL

## 2024-08-05 ENCOUNTER — HOSPITAL ENCOUNTER (OUTPATIENT)
Age: 54
Setting detail: OUTPATIENT SURGERY
Discharge: HOME | End: 2024-08-05
Attending: INTERNAL MEDICINE | Admitting: INTERNAL MEDICINE
Payer: COMMERCIAL

## 2024-08-05 ENCOUNTER — APPOINTMENT (OUTPATIENT)
Dept: CT IMAGING | Age: 54
End: 2024-08-05
Attending: INTERNAL MEDICINE
Payer: COMMERCIAL

## 2024-08-05 VITALS
RESPIRATION RATE: 18 BRPM | WEIGHT: 245 LBS | HEIGHT: 72 IN | SYSTOLIC BLOOD PRESSURE: 124 MMHG | TEMPERATURE: 96.8 F | DIASTOLIC BLOOD PRESSURE: 75 MMHG | HEART RATE: 60 BPM | BODY MASS INDEX: 33.18 KG/M2 | OXYGEN SATURATION: 96 %

## 2024-08-05 DIAGNOSIS — K70.30 ALCOHOLIC CIRRHOSIS OF LIVER (HCC): ICD-10-CM

## 2024-08-05 DIAGNOSIS — Z12.11 SCREEN FOR COLON CANCER: ICD-10-CM

## 2024-08-05 PROCEDURE — 2500000003 HC RX 250 WO HCPCS

## 2024-08-05 PROCEDURE — 7100000010 HC PHASE II RECOVERY - FIRST 15 MIN: Performed by: INTERNAL MEDICINE

## 2024-08-05 PROCEDURE — 3700000001 HC ADD 15 MINUTES (ANESTHESIA): Performed by: INTERNAL MEDICINE

## 2024-08-05 PROCEDURE — 6360000002 HC RX W HCPCS

## 2024-08-05 PROCEDURE — 74177 CT ABD & PELVIS W/CONTRAST: CPT

## 2024-08-05 PROCEDURE — 7100000011 HC PHASE II RECOVERY - ADDTL 15 MIN: Performed by: INTERNAL MEDICINE

## 2024-08-05 PROCEDURE — 2709999900 HC NON-CHARGEABLE SUPPLY: Performed by: INTERNAL MEDICINE

## 2024-08-05 PROCEDURE — 88305 TISSUE EXAM BY PATHOLOGIST: CPT | Performed by: PATHOLOGY

## 2024-08-05 PROCEDURE — 2580000003 HC RX 258: Performed by: LICENSED PRACTICAL NURSE

## 2024-08-05 PROCEDURE — 3609010200 HC COLONOSCOPY ABLATION TUMOR POLYP/OTHER LES: Performed by: INTERNAL MEDICINE

## 2024-08-05 PROCEDURE — 6360000002 HC RX W HCPCS: Performed by: INTERNAL MEDICINE

## 2024-08-05 PROCEDURE — C1713 ANCHOR/SCREW BN/BN,TIS/BN: HCPCS | Performed by: INTERNAL MEDICINE

## 2024-08-05 PROCEDURE — C1889 IMPLANT/INSERT DEVICE, NOC: HCPCS | Performed by: INTERNAL MEDICINE

## 2024-08-05 PROCEDURE — 3609012400 HC EGD TRANSORAL BIOPSY SINGLE/MULTIPLE: Performed by: INTERNAL MEDICINE

## 2024-08-05 PROCEDURE — 6360000004 HC RX CONTRAST MEDICATION: Performed by: INTERNAL MEDICINE

## 2024-08-05 PROCEDURE — 3700000000 HC ANESTHESIA ATTENDED CARE: Performed by: INTERNAL MEDICINE

## 2024-08-05 DEVICE — INSTINCT PLUS ENDOSCOPIC CLIPPING DEVICE
Type: IMPLANTABLE DEVICE | Site: SIGMOID COLON | Status: FUNCTIONAL
Brand: INSTINCT

## 2024-08-05 DEVICE — CLIP
Type: IMPLANTABLE DEVICE | Site: ASCENDING COLON | Status: FUNCTIONAL
Brand: RESOLUTION 360™ ULTRA CLIP

## 2024-08-05 DEVICE — WORKING LENGTH 235CM, WORKING CHANNEL 2.8MM
Type: IMPLANTABLE DEVICE | Site: ASCENDING COLON | Status: FUNCTIONAL
Brand: RESOLUTION 360 CLIP

## 2024-08-05 RX ORDER — PROPOFOL 10 MG/ML
INJECTION, EMULSION INTRAVENOUS PRN
Status: DISCONTINUED | OUTPATIENT
Start: 2024-08-05 | End: 2024-08-05 | Stop reason: SDUPTHER

## 2024-08-05 RX ORDER — SODIUM CHLORIDE 9 MG/ML
INJECTION, SOLUTION INTRAVENOUS PRN
Status: DISCONTINUED | OUTPATIENT
Start: 2024-08-05 | End: 2024-08-05 | Stop reason: HOSPADM

## 2024-08-05 RX ORDER — LIDOCAINE HYDROCHLORIDE 20 MG/ML
INJECTION, SOLUTION EPIDURAL; INFILTRATION; INTRACAUDAL; PERINEURAL PRN
Status: DISCONTINUED | OUTPATIENT
Start: 2024-08-05 | End: 2024-08-05 | Stop reason: SDUPTHER

## 2024-08-05 RX ORDER — OMEPRAZOLE 40 MG/1
40 CAPSULE, DELAYED RELEASE ORAL
Qty: 90 CAPSULE | Refills: 0 | Status: SHIPPED | OUTPATIENT
Start: 2024-08-05

## 2024-08-05 RX ORDER — EPINEPHRINE 1 MG/ML(1)
AMPUL (ML) INJECTION PRN
Status: DISCONTINUED | OUTPATIENT
Start: 2024-08-05 | End: 2024-08-05 | Stop reason: ALTCHOICE

## 2024-08-05 RX ORDER — SODIUM CHLORIDE 0.9 % (FLUSH) 0.9 %
5-40 SYRINGE (ML) INJECTION PRN
Status: DISCONTINUED | OUTPATIENT
Start: 2024-08-05 | End: 2024-08-05 | Stop reason: HOSPADM

## 2024-08-05 RX ORDER — ONDANSETRON 2 MG/ML
INJECTION INTRAMUSCULAR; INTRAVENOUS PRN
Status: DISCONTINUED | OUTPATIENT
Start: 2024-08-05 | End: 2024-08-05 | Stop reason: SDUPTHER

## 2024-08-05 RX ORDER — MIDAZOLAM HYDROCHLORIDE 1 MG/ML
INJECTION INTRAMUSCULAR; INTRAVENOUS PRN
Status: DISCONTINUED | OUTPATIENT
Start: 2024-08-05 | End: 2024-08-05 | Stop reason: SDUPTHER

## 2024-08-05 RX ORDER — SODIUM CHLORIDE, SODIUM LACTATE, POTASSIUM CHLORIDE, CALCIUM CHLORIDE 600; 310; 30; 20 MG/100ML; MG/100ML; MG/100ML; MG/100ML
INJECTION, SOLUTION INTRAVENOUS CONTINUOUS
Status: DISCONTINUED | OUTPATIENT
Start: 2024-08-05 | End: 2024-08-05 | Stop reason: HOSPADM

## 2024-08-05 RX ORDER — METOCLOPRAMIDE HYDROCHLORIDE 5 MG/ML
INJECTION INTRAMUSCULAR; INTRAVENOUS PRN
Status: DISCONTINUED | OUTPATIENT
Start: 2024-08-05 | End: 2024-08-05 | Stop reason: SDUPTHER

## 2024-08-05 RX ORDER — SODIUM CHLORIDE 0.9 % (FLUSH) 0.9 %
5-40 SYRINGE (ML) INJECTION EVERY 12 HOURS SCHEDULED
Status: DISCONTINUED | OUTPATIENT
Start: 2024-08-05 | End: 2024-08-05 | Stop reason: HOSPADM

## 2024-08-05 RX ADMIN — PROPOFOL 20 MG: 10 INJECTION, EMULSION INTRAVENOUS at 12:40

## 2024-08-05 RX ADMIN — PROPOFOL 100 MG: 10 INJECTION, EMULSION INTRAVENOUS at 12:06

## 2024-08-05 RX ADMIN — PROPOFOL 20 MG: 10 INJECTION, EMULSION INTRAVENOUS at 12:29

## 2024-08-05 RX ADMIN — PROPOFOL 50 MG: 10 INJECTION, EMULSION INTRAVENOUS at 12:18

## 2024-08-05 RX ADMIN — ONDANSETRON 4 MG: 2 INJECTION INTRAMUSCULAR; INTRAVENOUS at 13:14

## 2024-08-05 RX ADMIN — PROPOFOL 30 MG: 10 INJECTION, EMULSION INTRAVENOUS at 12:26

## 2024-08-05 RX ADMIN — PROPOFOL 30 MG: 10 INJECTION, EMULSION INTRAVENOUS at 12:23

## 2024-08-05 RX ADMIN — PROPOFOL 20 MG: 10 INJECTION, EMULSION INTRAVENOUS at 12:42

## 2024-08-05 RX ADMIN — PROPOFOL 20 MG: 10 INJECTION, EMULSION INTRAVENOUS at 12:39

## 2024-08-05 RX ADMIN — PROPOFOL 20 MG: 10 INJECTION, EMULSION INTRAVENOUS at 12:50

## 2024-08-05 RX ADMIN — PROPOFOL 50 MG: 10 INJECTION, EMULSION INTRAVENOUS at 12:07

## 2024-08-05 RX ADMIN — PROPOFOL 20 MG: 10 INJECTION, EMULSION INTRAVENOUS at 13:04

## 2024-08-05 RX ADMIN — PROPOFOL 20 MG: 10 INJECTION, EMULSION INTRAVENOUS at 12:53

## 2024-08-05 RX ADMIN — PROPOFOL 50 MG: 10 INJECTION, EMULSION INTRAVENOUS at 12:20

## 2024-08-05 RX ADMIN — PROPOFOL 20 MG: 10 INJECTION, EMULSION INTRAVENOUS at 12:33

## 2024-08-05 RX ADMIN — LIDOCAINE HYDROCHLORIDE 20 MG: 20 INJECTION, SOLUTION EPIDURAL; INFILTRATION; INTRACAUDAL; PERINEURAL at 12:06

## 2024-08-05 RX ADMIN — LIDOCAINE HYDROCHLORIDE 80 MG: 20 INJECTION, SOLUTION EPIDURAL; INFILTRATION; INTRACAUDAL; PERINEURAL at 12:57

## 2024-08-05 RX ADMIN — METOCLOPRAMIDE 10 MG: 5 INJECTION, SOLUTION INTRAMUSCULAR; INTRAVENOUS at 13:18

## 2024-08-05 RX ADMIN — PROPOFOL 20 MG: 10 INJECTION, EMULSION INTRAVENOUS at 12:45

## 2024-08-05 RX ADMIN — PROPOFOL 20 MG: 10 INJECTION, EMULSION INTRAVENOUS at 12:51

## 2024-08-05 RX ADMIN — PROPOFOL 50 MG: 10 INJECTION, EMULSION INTRAVENOUS at 12:58

## 2024-08-05 RX ADMIN — MIDAZOLAM 2 MG: 1 INJECTION INTRAMUSCULAR; INTRAVENOUS at 12:20

## 2024-08-05 RX ADMIN — PROPOFOL 50 MG: 10 INJECTION, EMULSION INTRAVENOUS at 12:09

## 2024-08-05 RX ADMIN — PROPOFOL 30 MG: 10 INJECTION, EMULSION INTRAVENOUS at 13:01

## 2024-08-05 RX ADMIN — PROPOFOL 20 MG: 10 INJECTION, EMULSION INTRAVENOUS at 12:48

## 2024-08-05 RX ADMIN — PROPOFOL 20 MG: 10 INJECTION, EMULSION INTRAVENOUS at 12:43

## 2024-08-05 RX ADMIN — PROPOFOL 20 MG: 10 INJECTION, EMULSION INTRAVENOUS at 12:46

## 2024-08-05 RX ADMIN — SODIUM CHLORIDE, POTASSIUM CHLORIDE, SODIUM LACTATE AND CALCIUM CHLORIDE: 600; 310; 30; 20 INJECTION, SOLUTION INTRAVENOUS at 08:34

## 2024-08-05 RX ADMIN — PROPOFOL 50 MG: 10 INJECTION, EMULSION INTRAVENOUS at 12:12

## 2024-08-05 RX ADMIN — PROPOFOL 50 MG: 10 INJECTION, EMULSION INTRAVENOUS at 12:15

## 2024-08-05 RX ADMIN — PROPOFOL 20 MG: 10 INJECTION, EMULSION INTRAVENOUS at 12:36

## 2024-08-05 RX ADMIN — IOPAMIDOL 80 ML: 755 INJECTION, SOLUTION INTRAVENOUS at 14:22

## 2024-08-05 ASSESSMENT — PAIN - FUNCTIONAL ASSESSMENT
PAIN_FUNCTIONAL_ASSESSMENT: 0-10
PAIN_FUNCTIONAL_ASSESSMENT: ACTIVITIES ARE NOT PREVENTED
PAIN_FUNCTIONAL_ASSESSMENT: ACTIVITIES ARE NOT PREVENTED
PAIN_FUNCTIONAL_ASSESSMENT: 0-10

## 2024-08-05 ASSESSMENT — PAIN DESCRIPTION - LOCATION: LOCATION: ABDOMEN

## 2024-08-05 ASSESSMENT — PAIN DESCRIPTION - PAIN TYPE: TYPE: ACUTE PAIN

## 2024-08-05 ASSESSMENT — PAIN DESCRIPTION - DESCRIPTORS
DESCRIPTORS: CRAMPING
DESCRIPTORS: CRAMPING

## 2024-08-05 ASSESSMENT — PAIN SCALES - GENERAL: PAINLEVEL_OUTOF10: 2

## 2024-08-05 ASSESSMENT — PAIN DESCRIPTION - FREQUENCY: FREQUENCY: CONTINUOUS

## 2024-08-05 NOTE — DISCHARGE INSTRUCTIONS
EGD/COLONOSCOPY    ___MIKE MORELAND_______________________________    OFFICE CV[facility]__[phone]_________________     FOLLOW UP APPOINTMENT 1 YEAR. CALL DR. MORELAND'S OFFICE IN 1 WEEK FOR POLYP BIOPSY RESULTS.    REPEAT COLONOSCOPY PROCEDURE IN ____1____YEAR AND EGD AS NEEDED.    TEST ORDERED: NONE       What to Expect at Home    Your Recovery:EGD  The only discomfort after your EGD is generally limited to a mild soreness of the throat, which may last a day or two. Call your physician immediately if you have severe chest pain, shortness of breath or a temperature of 100 degrees or higher if taken orally.    Your Recovery: COLON   Your doctor will tell you when you can eat and do your other usual activitiesYour doctor will talk to you about when you will need your next colonoscopy. Your doctor can help you decide how often you need to be checked. This will depend on the results of your test and your risk for colorectal cancer.  After the test, you may be bloated or have gas pains. You may need to pass gas. If a biopsy was done or a polyp was removed, you may have streaks of blood in your stool (feces) for a few days.  This care sheet gives you a general idea about how long it will take for you to recover. But each person recovers at a different pace. Follow the steps below to get better as quickly as possible.    How can you care for yourself at home?  Activity  Rest as much as you need to after you go home.  You should be able to go back to your usual activities the day after the test.  Diet  Follow your doctor's directions for eating after the test.  Drink plenty of fluids (unless your doctor has told you not to).  Medications  If you have a sore throat the day after the test, use an over-the-counter spray to numb your throat.  Your doctor will tell you if and when you can restart your medicines. He or she will also give you instructions about taking any new medicines.  If you take blood thinners, such as warfarin

## 2024-08-05 NOTE — H&P
ENDOSCOPY   Pre-operative History and Physical    Patient: Harsha Liu  : 1970      History Obtained From:  patient, electronic medical record        HISTORY OF PRESENT ILLNESS:                The patient is a 54 y.o. male with significant past medical history as below who presents for EGD/colonoscopy    Indication Decompensated ETOH cirrhosis screen for varices, Colon cancer screening.     Past Medical History:        Diagnosis Date    Anxiety     Hernia, inguinal, left     Liver disease 3/16/23       Past Surgical History:        Procedure Laterality Date    CYST REMOVAL Left     LEFT ARM    HERNIA REPAIR      left inguinal hernia- Dr. Vanegas          Current Medications:    Medications    Prior to Admission medications    Medication Sig Start Date End Date Taking? Authorizing Provider   vitamin B-1 (THIAMINE) 100 MG tablet Take 1 tablet by mouth daily   Yes ProviderDm MD   Multiple Vitamins-Minerals (THERAPEUTIC MULTIVITAMIN-MINERALS) tablet Take 1 tablet by mouth daily   Yes ProviderDm MD   thiamine 100 MG tablet Take 1 tablet by mouth daily  Patient not taking: Reported on 2024   John Blue MD   acamprosate (CAMPRAL) 333 MG tablet take 1 tablet THREE TIMES DAILY with food 24   John Blue MD   atorvastatin (LIPITOR) 40 MG tablet Take 1 tablet by mouth nightly 24  John Blue MD   nadolol (CORGARD) 20 MG tablet Take 1 tablet by mouth daily 24   John Blue MD   PARoxetine (PAXIL) 20 MG tablet Take 1.5 tablets by mouth every morning  Patient taking differently: Take 1 tablet by mouth every morning 24 Prescription noted for 1 1/2 tablets daily patient reports only taking 20 mg daily 24   John Blue MD   simethicone (MYLICON) 80 MG chewable tablet Take 1 tablet by mouth once for 1 dose  Patient not taking: Reported on 2024  Dena Dean, APRN - CNP

## 2024-08-05 NOTE — ANESTHESIA POSTPROCEDURE EVALUATION
Department of Anesthesiology  Postprocedure Note    Patient: Harsha Liu  MRN: [id number]  YOB: 1970  Date of evaluation: 8/5/2024    Procedure Summary       Date: 08/05/24 Room / Location: [place] [address] / [facility]    Anesthesia Start: 1201 Anesthesia Stop: 1330    Procedures:       COLONOSCOPY POLYPECTOMY ABLATION WITH EPI INJECTION HEMACLIP X 8      ESOPHAGOGASTRODUODENOSCOPY BIOPSY Diagnosis:       Alcoholic cirrhosis of liver (HCC)      Screen for colon cancer      (Alcoholic cirrhosis of liver (HCC) [K70.30])      (Screen for colon cancer [Z12.11])    Surgeons: Polo Marks MD Responsible Provider: Sidney Kahn MD    Anesthesia Type: MAC ASA Status: 3            Anesthesia Type: No value filed.    Maria R Phase I:      Maria R Phase II:      Anesthesia Post Evaluation    Patient location during evaluation: bedside  Patient participation: complete - patient participated  Level of consciousness: awake  Pain score: 7  Airway patency: patent  Nausea & Vomiting: nausea  Cardiovascular status: blood pressure returned to baseline and hemodynamically stable  Respiratory status: acceptable  Hydration status: euvolemic  Comments: Patient with severe abdominal pain and nausea. He was escorted by his RN for STAT CT Scan. Hemodynamically stable and stable respiratory status as well.  There was medical reason for not using a multimodal analgesia pain management approach.Pain management: adequate    No notable events documented.

## 2024-08-09 NOTE — RESULT ENCOUNTER NOTE
Surgeon pathology revealed several polyps that were precancerous however no H. pylori/infection was found there was some mild gastritis seen in the stomach.  He will need a repeat colonoscopy in 1 year.

## 2024-08-23 ENCOUNTER — HOSPITAL ENCOUNTER (OUTPATIENT)
Age: 54
Discharge: HOME | End: 2024-08-23
Payer: COMMERCIAL

## 2024-08-23 ENCOUNTER — TELEPHONE (OUTPATIENT)
Dept: FAMILY MEDICINE CLINIC | Age: 54
End: 2024-08-23

## 2024-08-23 LAB
ALBUMIN SERPL-MCNC: 4.3 GM/DL (ref 3.4–5)
ALBUMIN SERPL-MCNC: 4.3 GM/DL (ref 3.4–5)
ALP BLD-CCNC: 100 IU/L (ref 40–129)
ALP BLD-CCNC: 101 IU/L (ref 40–129)
ALT SERPL-CCNC: 28 U/L (ref 10–40)
ALT SERPL-CCNC: 29 U/L (ref 10–40)
ANION GAP SERPL CALCULATED.3IONS-SCNC: 12 MMOL/L (ref 7–16)
AST SERPL-CCNC: 25 IU/L (ref 15–37)
AST SERPL-CCNC: 26 IU/L (ref 15–37)
BASOPHILS ABSOLUTE: 0.1 K/CU MM
BASOPHILS RELATIVE PERCENT: 1.4 % (ref 0–1)
BILIRUB SERPL-MCNC: 0.5 MG/DL (ref 0–1)
BILIRUB SERPL-MCNC: 0.5 MG/DL (ref 0–1)
BILIRUBIN DIRECT: 0.2 MG/DL (ref 0–0.3)
BILIRUBIN, INDIRECT: 0.3 MG/DL (ref 0–0.7)
BUN SERPL-MCNC: 16 MG/DL (ref 6–23)
CALCIUM SERPL-MCNC: 9.1 MG/DL (ref 8.3–10.6)
CHLORIDE BLD-SCNC: 107 MMOL/L (ref 99–110)
CHOLEST SERPL-MCNC: 199 MG/DL
CO2: 24 MMOL/L (ref 21–32)
CREAT SERPL-MCNC: 0.7 MG/DL (ref 0.9–1.3)
DIFFERENTIAL TYPE: ABNORMAL
EOSINOPHILS ABSOLUTE: 0.4 K/CU MM
EOSINOPHILS RELATIVE PERCENT: 7.2 % (ref 0–3)
GFR, ESTIMATED: >90 ML/MIN/1.73M2
GLUCOSE SERPL-MCNC: 111 MG/DL (ref 70–99)
HCT VFR BLD CALC: 43.8 % (ref 42–52)
HDLC SERPL-MCNC: 58 MG/DL
HEMOGLOBIN: 14.9 GM/DL (ref 13.5–18)
HIV 1+2 AB+HIV1P24 AG SERPLBLD IA.RAPID: NON REACTIVE
IMMATURE NEUTROPHIL %: 0.8 % (ref 0–0.43)
LDLC SERPL CALC-MCNC: 119 MG/DL
LYMPHOCYTES ABSOLUTE: 1.2 K/CU MM
LYMPHOCYTES RELATIVE PERCENT: 24.2 % (ref 24–44)
MCH RBC QN AUTO: 29.1 PG (ref 27–31)
MCHC RBC AUTO-ENTMCNC: 34 % (ref 32–36)
MCV RBC AUTO: 85.5 FL (ref 78–100)
MONOCYTES ABSOLUTE: 0.6 K/CU MM
MONOCYTES RELATIVE PERCENT: 11 % (ref 0–4)
NEUTROPHILS ABSOLUTE: 2.8 K/CU MM
NEUTROPHILS RELATIVE PERCENT: 55.4 % (ref 36–66)
NUCLEATED RBC %: 0 %
PDW BLD-RTO: 14.2 % (ref 11.7–14.9)
PLATELET # BLD: 146 K/CU MM (ref 140–440)
PMV BLD AUTO: 10.3 FL (ref 7.5–11.1)
POTASSIUM SERPL-SCNC: 4.5 MMOL/L (ref 3.5–5.1)
RBC # BLD: 5.12 M/CU MM (ref 4.6–6.2)
RPR SER QL: NON REACTIVE
SODIUM BLD-SCNC: 143 MMOL/L (ref 135–145)
TOTAL IMMATURE NEUTOROPHIL: 0.04 K/CU MM
TOTAL NUCLEATED RBC: 0 K/CU MM
TOTAL PROTEIN: 7 GM/DL (ref 6.4–8.2)
TOTAL PROTEIN: 7.7 GM/DL (ref 6.4–8.2)
TRIGL SERPL-MCNC: 111 MG/DL
WBC # BLD: 5 K/CU MM (ref 4–10.5)

## 2024-08-23 PROCEDURE — 82105 ALPHA-FETOPROTEIN SERUM: CPT

## 2024-08-23 PROCEDURE — 80053 COMPREHEN METABOLIC PANEL: CPT

## 2024-08-23 PROCEDURE — 80076 HEPATIC FUNCTION PANEL: CPT

## 2024-08-23 PROCEDURE — 85025 COMPLETE CBC W/AUTO DIFF WBC: CPT

## 2024-08-23 PROCEDURE — 86803 HEPATITIS C AB TEST: CPT

## 2024-08-23 PROCEDURE — 36415 COLL VENOUS BLD VENIPUNCTURE: CPT

## 2024-08-23 PROCEDURE — 86480 TB TEST CELL IMMUN MEASURE: CPT

## 2024-08-23 PROCEDURE — 86704 HEP B CORE ANTIBODY TOTAL: CPT

## 2024-08-23 PROCEDURE — 86592 SYPHILIS TEST NON-TREP QUAL: CPT

## 2024-08-23 PROCEDURE — 86780 TREPONEMA PALLIDUM: CPT

## 2024-08-23 PROCEDURE — 86706 HEP B SURFACE ANTIBODY: CPT

## 2024-08-23 PROCEDURE — 80061 LIPID PANEL: CPT

## 2024-08-23 PROCEDURE — 87389 HIV-1 AG W/HIV-1&-2 AB AG IA: CPT

## 2024-08-23 PROCEDURE — 87341 HEP B SURFACE AG NEUTRLZJ IA: CPT

## 2024-08-24 LAB
AFP-TM SERPL-MCNC: 3 NG/ML (ref 0–9)
HBV CORE AB SERPL QL IA: NEGATIVE
HBV SURFACE AB SERPL IA-ACNC: <3.5 M[IU]/ML

## 2024-08-25 LAB
HBV SURFACE AG SERPL QL CFM: NORMAL
HCV AB SERPL QL IA: <0.02 IV
HCV AB SERPL QL IA: NEGATIVE

## 2024-08-27 LAB
QUANTI TB1 MINUS NIL: 0 IU/ML
QUANTI TB2 MINUS NIL: 0.01 IU/ML
QUANTIFERON (R) TB GOLD (INCUBATED): NEGATIVE IU/ML
QUANTIFERON MITOGEN MINUS NIL: 10 IU/ML
QUANTIFERON NIL: 0 IU/ML

## 2024-09-01 PROBLEM — Z12.11 SCREEN FOR COLON CANCER: Status: RESOLVED | Noted: 2024-06-05 | Resolved: 2024-09-01

## 2024-10-02 ENCOUNTER — OFFICE VISIT (OUTPATIENT)
Dept: FAMILY MEDICINE CLINIC | Age: 54
End: 2024-10-02
Payer: COMMERCIAL

## 2024-10-02 VITALS
DIASTOLIC BLOOD PRESSURE: 82 MMHG | OXYGEN SATURATION: 97 % | HEIGHT: 72 IN | BODY MASS INDEX: 33.72 KG/M2 | HEART RATE: 67 BPM | SYSTOLIC BLOOD PRESSURE: 128 MMHG | WEIGHT: 249 LBS

## 2024-10-02 DIAGNOSIS — E78.2 MIXED HYPERLIPIDEMIA: ICD-10-CM

## 2024-10-02 DIAGNOSIS — K70.30 ALCOHOLIC CIRRHOSIS, UNSPECIFIED WHETHER ASCITES PRESENT (HCC): Primary | ICD-10-CM

## 2024-10-02 DIAGNOSIS — R53.82 CHRONIC FATIGUE: ICD-10-CM

## 2024-10-02 DIAGNOSIS — F34.1 DYSTHYMIA: ICD-10-CM

## 2024-10-02 DIAGNOSIS — F10.11 HISTORY OF ETOH ABUSE: ICD-10-CM

## 2024-10-02 PROCEDURE — 99214 OFFICE O/P EST MOD 30 MIN: CPT | Performed by: INTERNAL MEDICINE

## 2024-10-02 PROCEDURE — 3017F COLORECTAL CA SCREEN DOC REV: CPT | Performed by: INTERNAL MEDICINE

## 2024-10-02 PROCEDURE — G8427 DOCREV CUR MEDS BY ELIG CLIN: HCPCS | Performed by: INTERNAL MEDICINE

## 2024-10-02 PROCEDURE — 1036F TOBACCO NON-USER: CPT | Performed by: INTERNAL MEDICINE

## 2024-10-02 PROCEDURE — G8484 FLU IMMUNIZE NO ADMIN: HCPCS | Performed by: INTERNAL MEDICINE

## 2024-10-02 PROCEDURE — G8417 CALC BMI ABV UP PARAM F/U: HCPCS | Performed by: INTERNAL MEDICINE

## 2024-10-02 RX ORDER — DISULFIRAM 250 MG/1
250 TABLET ORAL DAILY
COMMUNITY
Start: 2024-09-03

## 2024-10-02 NOTE — PROGRESS NOTES
Mixed hyperlipidemia        4. Dysthymia        5. Chronic fatigue  TSH with Reflex          Doing well with current meds.  He denies any palpitations.  Trace leg edema.  He has a counselor twice weekly  and doctor monthly for the alcohol treatment.  BP with good control.  Labs in August were good.     Will check thyroid levels for his fatigue.    Return in about 4 months (around 2/2/2025).     John Blue MD, 10/2/2024 4:45 PM

## 2024-11-18 ENCOUNTER — TELEPHONE (OUTPATIENT)
Dept: FAMILY MEDICINE CLINIC | Age: 54
End: 2024-11-18

## 2024-11-18 DIAGNOSIS — R73.9 HYPERGLYCEMIA: ICD-10-CM

## 2024-11-18 DIAGNOSIS — R53.82 CHRONIC FATIGUE: Primary | ICD-10-CM

## 2024-12-13 ENCOUNTER — HOSPITAL ENCOUNTER (OUTPATIENT)
Age: 54
Discharge: HOME | End: 2024-12-13
Payer: COMMERCIAL

## 2024-12-13 ENCOUNTER — PATIENT MESSAGE (OUTPATIENT)
Dept: FAMILY MEDICINE CLINIC | Age: 54
End: 2024-12-13

## 2024-12-13 DIAGNOSIS — R53.82 CHRONIC FATIGUE: ICD-10-CM

## 2024-12-13 DIAGNOSIS — R73.9 HYPERGLYCEMIA: ICD-10-CM

## 2024-12-13 LAB
EST. AVERAGE GLUCOSE BLD GHB EST-MCNC: 131 MG/DL
HBA1C MFR BLD: 6.2 % (ref 4.2–6.3)
TSH SERPL DL<=0.05 MIU/L-ACNC: 0.89 UIU/ML (ref 0.27–4.2)

## 2024-12-13 PROCEDURE — 83036 HEMOGLOBIN GLYCOSYLATED A1C: CPT

## 2024-12-13 PROCEDURE — 84443 ASSAY THYROID STIM HORMONE: CPT

## 2024-12-13 PROCEDURE — 36415 COLL VENOUS BLD VENIPUNCTURE: CPT

## 2024-12-20 DIAGNOSIS — R53.82 CHRONIC FATIGUE: Primary | ICD-10-CM

## 2024-12-27 ENCOUNTER — HOSPITAL ENCOUNTER (OUTPATIENT)
Age: 54
Discharge: HOME | End: 2024-12-27
Payer: COMMERCIAL

## 2024-12-27 PROCEDURE — 84403 ASSAY OF TOTAL TESTOSTERONE: CPT

## 2024-12-27 PROCEDURE — 84270 ASSAY OF SEX HORMONE GLOBUL: CPT

## 2024-12-27 PROCEDURE — 84402 ASSAY OF FREE TESTOSTERONE: CPT

## 2024-12-29 LAB
SEX HORMONE BINDING GLOBULIN: 56 NMOL/L (ref 19–76)
TESTOSTERONE FREE PERCENT: 1.4 % (ref 1.6–2.9)
TESTOSTERONE FREE-MALE: 71 PG/ML (ref 47–244)
TESTOSTERONE TOTAL-MALE: 517 NG/DL (ref 300–890)

## 2024-12-30 RX ORDER — OMEPRAZOLE 40 MG/1
CAPSULE, DELAYED RELEASE ORAL
Qty: 90 CAPSULE | Refills: 0 | Status: SHIPPED | OUTPATIENT
Start: 2024-12-30

## 2024-12-31 RX ORDER — DISULFIRAM 250 MG/1
250 TABLET ORAL NIGHTLY
Qty: 90 TABLET | Refills: 0 | Status: SHIPPED | OUTPATIENT
Start: 2024-12-31

## 2024-12-31 NOTE — TELEPHONE ENCOUNTER
To Dr. Blue-    Patient would like a refill of Antabuse 250 mg qd (for alcohol)---he goes to [facility] but the doctor won't refill unless they see him and it is really expensive every time he has to go there.    [facility]     Please call patient to let him know if you will do this.

## 2025-01-13 RX ORDER — OMEPRAZOLE 40 MG/1
CAPSULE, DELAYED RELEASE ORAL
Qty: 90 CAPSULE | Refills: 0 | Status: SHIPPED | OUTPATIENT
Start: 2025-01-13

## 2025-01-16 ENCOUNTER — OFFICE VISIT (OUTPATIENT)
Dept: FAMILY MEDICINE CLINIC | Age: 55
End: 2025-01-16
Payer: COMMERCIAL

## 2025-01-16 VITALS
HEIGHT: 72 IN | SYSTOLIC BLOOD PRESSURE: 118 MMHG | OXYGEN SATURATION: 97 % | HEART RATE: 69 BPM | DIASTOLIC BLOOD PRESSURE: 78 MMHG | WEIGHT: 266 LBS | BODY MASS INDEX: 36.03 KG/M2

## 2025-01-16 DIAGNOSIS — K76.6 PORTAL HYPERTENSION (HCC): Primary | ICD-10-CM

## 2025-01-16 DIAGNOSIS — K70.30 ALCOHOLIC CIRRHOSIS, UNSPECIFIED WHETHER ASCITES PRESENT (HCC): ICD-10-CM

## 2025-01-16 DIAGNOSIS — M25.551 HIP PAIN, RIGHT: ICD-10-CM

## 2025-01-16 DIAGNOSIS — E78.2 MIXED HYPERLIPIDEMIA: ICD-10-CM

## 2025-01-16 PROCEDURE — 99213 OFFICE O/P EST LOW 20 MIN: CPT | Performed by: INTERNAL MEDICINE

## 2025-01-16 RX ORDER — NADOLOL 20 MG/1
20 TABLET ORAL DAILY
Qty: 90 TABLET | Refills: 1 | Status: SHIPPED | OUTPATIENT
Start: 2025-01-16

## 2025-01-16 RX ORDER — PAROXETINE 20 MG/1
20 TABLET, FILM COATED ORAL EVERY MORNING
Qty: 90 TABLET | Refills: 1
Start: 2025-01-16

## 2025-01-16 RX ORDER — LANOLIN ALCOHOL/MO/W.PET/CERES
100 CREAM (GRAM) TOPICAL DAILY
Qty: 90 TABLET | Refills: 1 | Status: SHIPPED | OUTPATIENT
Start: 2025-01-16

## 2025-01-16 RX ORDER — PAROXETINE 20 MG/1
20 TABLET, FILM COATED ORAL EVERY MORNING
Qty: 90 TABLET | Refills: 1 | Status: SHIPPED | OUTPATIENT
Start: 2025-01-16 | End: 2025-01-16

## 2025-01-16 RX ORDER — ATORVASTATIN CALCIUM 40 MG/1
40 TABLET, FILM COATED ORAL NIGHTLY
Qty: 90 TABLET | Refills: 1 | Status: SHIPPED | OUTPATIENT
Start: 2025-01-16 | End: 2025-07-15

## 2025-01-16 ASSESSMENT — PATIENT HEALTH QUESTIONNAIRE - PHQ9
SUM OF ALL RESPONSES TO PHQ9 QUESTIONS 1 & 2: 0
2. FEELING DOWN, DEPRESSED OR HOPELESS: NOT AT ALL
9. THOUGHTS THAT YOU WOULD BE BETTER OFF DEAD, OR OF HURTING YOURSELF: NOT AT ALL
SUM OF ALL RESPONSES TO PHQ QUESTIONS 1-9: 0
1. LITTLE INTEREST OR PLEASURE IN DOING THINGS: NOT AT ALL
5. POOR APPETITE OR OVEREATING: NOT AT ALL
10. IF YOU CHECKED OFF ANY PROBLEMS, HOW DIFFICULT HAVE THESE PROBLEMS MADE IT FOR YOU TO DO YOUR WORK, TAKE CARE OF THINGS AT HOME, OR GET ALONG WITH OTHER PEOPLE: NOT DIFFICULT AT ALL
SUM OF ALL RESPONSES TO PHQ QUESTIONS 1-9: 0
7. TROUBLE CONCENTRATING ON THINGS, SUCH AS READING THE NEWSPAPER OR WATCHING TELEVISION: NOT AT ALL
SUM OF ALL RESPONSES TO PHQ QUESTIONS 1-9: 0
8. MOVING OR SPEAKING SO SLOWLY THAT OTHER PEOPLE COULD HAVE NOTICED. OR THE OPPOSITE, BEING SO FIGETY OR RESTLESS THAT YOU HAVE BEEN MOVING AROUND A LOT MORE THAN USUAL: NOT AT ALL
SUM OF ALL RESPONSES TO PHQ QUESTIONS 1-9: 0
3. TROUBLE FALLING OR STAYING ASLEEP: NOT AT ALL
6. FEELING BAD ABOUT YOURSELF - OR THAT YOU ARE A FAILURE OR HAVE LET YOURSELF OR YOUR FAMILY DOWN: NOT AT ALL
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
## 2025-01-18 ENCOUNTER — HOSPITAL ENCOUNTER (OUTPATIENT)
Dept: GENERAL RADIOLOGY | Age: 55
Discharge: HOME | End: 2025-01-18
Payer: COMMERCIAL

## 2025-01-18 ENCOUNTER — HOSPITAL ENCOUNTER (OUTPATIENT)
Age: 55
Discharge: HOME | End: 2025-01-18
Payer: COMMERCIAL

## 2025-01-18 DIAGNOSIS — M25.551 HIP PAIN, RIGHT: ICD-10-CM

## 2025-01-18 PROCEDURE — 73502 X-RAY EXAM HIP UNI 2-3 VIEWS: CPT

## 2025-01-22 ENCOUNTER — PATIENT MESSAGE (OUTPATIENT)
Dept: FAMILY MEDICINE CLINIC | Age: 55
End: 2025-01-22

## 2025-01-22 DIAGNOSIS — M25.551 HIP PAIN, RIGHT: Primary | ICD-10-CM

## 2025-01-28 SDOH — HEALTH STABILITY: PHYSICAL HEALTH: ON AVERAGE, HOW MANY DAYS PER WEEK DO YOU ENGAGE IN MODERATE TO STRENUOUS EXERCISE (LIKE A BRISK WALK)?: 0 DAYS

## 2025-01-28 SDOH — HEALTH STABILITY: PHYSICAL HEALTH: ON AVERAGE, HOW MANY MINUTES DO YOU ENGAGE IN EXERCISE AT THIS LEVEL?: 0 MIN

## 2025-01-31 ENCOUNTER — OFFICE VISIT (OUTPATIENT)
Dept: ORTHOPEDIC SURGERY | Age: 55
End: 2025-01-31
Payer: COMMERCIAL

## 2025-01-31 VITALS
BODY MASS INDEX: 37.3 KG/M2 | WEIGHT: 275.4 LBS | OXYGEN SATURATION: 95 % | HEART RATE: 79 BPM | HEIGHT: 72 IN | RESPIRATION RATE: 14 BRPM

## 2025-01-31 DIAGNOSIS — M16.11 ARTHRITIS OF RIGHT HIP: ICD-10-CM

## 2025-01-31 DIAGNOSIS — M70.61 TROCHANTERIC BURSITIS OF RIGHT HIP: Primary | ICD-10-CM

## 2025-01-31 PROCEDURE — 99203 OFFICE O/P NEW LOW 30 MIN: CPT | Performed by: ORTHOPAEDIC SURGERY

## 2025-01-31 PROCEDURE — 20610 DRAIN/INJ JOINT/BURSA W/O US: CPT | Performed by: ORTHOPAEDIC SURGERY

## 2025-01-31 RX ORDER — TRIAMCINOLONE ACETONIDE 40 MG/ML
80 INJECTION, SUSPENSION INTRA-ARTICULAR; INTRAMUSCULAR ONCE
Status: COMPLETED | OUTPATIENT
Start: 2025-01-31 | End: 2025-01-31

## 2025-01-31 RX ADMIN — TRIAMCINOLONE ACETONIDE 80 MG: 40 INJECTION, SUSPENSION INTRA-ARTICULAR; INTRAMUSCULAR at 12:08
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
## 2025-01-31 NOTE — PATIENT INSTRUCTIONS
Continue weight-bearing as tolerated.  Continue range of motion exercises as instructed.  Ice and elevate as needed.  Tylenol or Motrin for pain.  Injection given into the right hip.  Follow up in 3 months    We are committed to providing you the best care possible.  If you receive a survey after visiting one of our offices, please take time to share your experience concerning your physician office visit.  These surveys are confidential and no health information about you is shared.  We are eager to improve for you and we are counting on your feedback to help make that happen.

## 2025-02-03 PROBLEM — M16.11 ARTHRITIS OF RIGHT HIP: Status: ACTIVE | Noted: 2025-02-03

## 2025-02-03 ASSESSMENT — ENCOUNTER SYMPTOMS
VOMITING: 0
EYE PAIN: 0
SHORTNESS OF BREATH: 0
CHEST TIGHTNESS: 0
EYE REDNESS: 0
WHEEZING: 0
COLOR CHANGE: 0
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
## 2025-03-06 ENCOUNTER — OFFICE VISIT (OUTPATIENT)
Dept: ORTHOPEDIC SURGERY | Age: 55
End: 2025-03-06
Payer: COMMERCIAL

## 2025-03-06 VITALS — HEIGHT: 72 IN | HEART RATE: 30 BPM | BODY MASS INDEX: 37.35 KG/M2 | OXYGEN SATURATION: 97 %

## 2025-03-06 DIAGNOSIS — M16.11 ARTHRITIS OF RIGHT HIP: Primary | ICD-10-CM

## 2025-03-06 PROCEDURE — 99214 OFFICE O/P EST MOD 30 MIN: CPT | Performed by: ORTHOPAEDIC SURGERY

## 2025-03-06 RX ORDER — LIDOCAINE 4 G/G
1 PATCH TOPICAL DAILY
Qty: 30 PATCH | Refills: 1 | Status: SHIPPED | OUTPATIENT
Start: 2025-03-06 | End: 2025-05-05

## 2025-03-06 RX ORDER — TRAMADOL HYDROCHLORIDE 50 MG/1
50 TABLET ORAL EVERY 8 HOURS PRN
Qty: 20 TABLET | Refills: 0 | Status: SHIPPED | OUTPATIENT
Start: 2025-03-06 | End: 2025-03-13

## 2025-03-06 ASSESSMENT — ENCOUNTER SYMPTOMS
COLOR CHANGE: 0
SHORTNESS OF BREATH: 0
WHEEZING: 0
EYE REDNESS: 0
EYE PAIN: 0
CHEST TIGHTNESS: 0
VOMITING: 0

## 2025-03-06 NOTE — PROGRESS NOTES
Pt returns to the office with right hip pain. Pt states the last cortizone injection helped his right hip for 2 weeks. Pt states after two weeks his pain became worse than before the injection. Pt states he has a hard time driving and sleeping on his right side. Pt would like to see what else he can do.

## 2025-03-06 NOTE — PATIENT INSTRUCTIONS
If you have any questions regarding your surgery scheduling, please call our office and ask to speak with Isabel [phone].
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
## 2025-03-10 ENCOUNTER — OFFICE VISIT (OUTPATIENT)
Dept: GASTROENTEROLOGY | Age: 55
End: 2025-03-10
Payer: COMMERCIAL

## 2025-03-10 ENCOUNTER — HOSPITAL ENCOUNTER (OUTPATIENT)
Age: 55
Discharge: HOME | End: 2025-03-10
Payer: COMMERCIAL

## 2025-03-10 VITALS
SYSTOLIC BLOOD PRESSURE: 136 MMHG | BODY MASS INDEX: 36 KG/M2 | HEART RATE: 84 BPM | OXYGEN SATURATION: 96 % | WEIGHT: 265.8 LBS | HEIGHT: 72 IN | DIASTOLIC BLOOD PRESSURE: 84 MMHG | RESPIRATION RATE: 16 BRPM

## 2025-03-10 DIAGNOSIS — K70.30 ALCOHOLIC CIRRHOSIS OF LIVER WITHOUT ASCITES (HCC): ICD-10-CM

## 2025-03-10 DIAGNOSIS — Z86.0100 HISTORY OF COLON POLYPS: Primary | ICD-10-CM

## 2025-03-10 DIAGNOSIS — I86.4 GASTRIC VARICES: ICD-10-CM

## 2025-03-10 DIAGNOSIS — I86.4 GASTRIC VARICES WITHOUT BLEEDING: ICD-10-CM

## 2025-03-10 LAB
ALBUMIN SERPL-MCNC: 4 G/DL (ref 3.4–5)
ALBUMIN/GLOB SERPL: 1.6 {RATIO} (ref 1.1–2.2)
ALP SERPL-CCNC: 100 U/L (ref 40–129)
ALT SERPL-CCNC: 31 U/L (ref 10–40)
ANION GAP SERPL CALCULATED.3IONS-SCNC: 9 MMOL/L (ref 9–17)
AST SERPL-CCNC: 30 U/L (ref 15–37)
BASOPHILS # BLD: 0.05 K/UL
BASOPHILS NFR BLD: 1 % (ref 0–1)
BILIRUB SERPL-MCNC: 0.4 MG/DL (ref 0–1)
BUN SERPL-MCNC: 15 MG/DL (ref 7–20)
CALCIUM SERPL-MCNC: 9.3 MG/DL (ref 8.3–10.6)
CHLORIDE SERPL-SCNC: 104 MMOL/L (ref 99–110)
CO2 SERPL-SCNC: 27 MMOL/L (ref 21–32)
CREAT SERPL-MCNC: 1.1 MG/DL (ref 0.9–1.3)
EOSINOPHIL # BLD: 0.12 K/UL
EOSINOPHILS RELATIVE PERCENT: 2 % (ref 0–3)
ERYTHROCYTE [DISTWIDTH] IN BLOOD BY AUTOMATED COUNT: 14 % (ref 11.7–14.9)
GFR, ESTIMATED: 74 ML/MIN/1.73M2
GLUCOSE SERPL-MCNC: 106 MG/DL (ref 74–99)
HCT VFR BLD AUTO: 45 % (ref 42–52)
HGB BLD-MCNC: 15 G/DL (ref 13.5–18)
IMM GRANULOCYTES # BLD AUTO: 0.08 K/UL
IMM GRANULOCYTES NFR BLD: 1 %
INR PPP: 0.9
LYMPHOCYTES NFR BLD: 2.28 K/UL
LYMPHOCYTES RELATIVE PERCENT: 34 % (ref 24–44)
MCH RBC QN AUTO: 28.7 PG (ref 27–31)
MCHC RBC AUTO-ENTMCNC: 33.3 G/DL (ref 32–36)
MCV RBC AUTO: 86.2 FL (ref 78–100)
MONOCYTES NFR BLD: 0.68 K/UL
MONOCYTES NFR BLD: 10 % (ref 0–4)
NEUTROPHILS NFR BLD: 52 % (ref 36–66)
NEUTS SEG NFR BLD: 3.44 K/UL
PLATELET # BLD AUTO: 178 K/UL (ref 140–440)
PMV BLD AUTO: 10.2 FL (ref 7.5–11.1)
POTASSIUM SERPL-SCNC: 4.4 MMOL/L (ref 3.5–5.1)
PROT SERPL-MCNC: 6.5 G/DL (ref 6.4–8.2)
PROTHROMBIN TIME: 12.4 SEC (ref 11.7–14.5)
RBC # BLD AUTO: 5.22 M/UL (ref 4.6–6.2)
SODIUM SERPL-SCNC: 139 MMOL/L (ref 136–145)
WBC OTHER # BLD: 6.7 K/UL (ref 4–10.5)

## 2025-03-10 PROCEDURE — 85025 COMPLETE CBC W/AUTO DIFF WBC: CPT

## 2025-03-10 PROCEDURE — 80053 COMPREHEN METABOLIC PANEL: CPT

## 2025-03-10 PROCEDURE — G2211 COMPLEX E/M VISIT ADD ON: HCPCS | Performed by: NURSE PRACTITIONER

## 2025-03-10 PROCEDURE — 99215 OFFICE O/P EST HI 40 MIN: CPT | Performed by: NURSE PRACTITIONER

## 2025-03-10 PROCEDURE — 82105 ALPHA-FETOPROTEIN SERUM: CPT

## 2025-03-10 PROCEDURE — 85610 PROTHROMBIN TIME: CPT

## 2025-03-10 RX ORDER — POLYETHYLENE GLYCOL 3350, SODIUM SULFATE, POTASSIUM CHLORIDE, MAGNESIUM SULFATE, AND SODIUM CHLORIDE FOR ORAL SOLUTION 178.7-7.3G
1 KIT ORAL ONCE
Qty: 1 EACH | Refills: 0 | Status: SHIPPED | OUTPATIENT
Start: 2025-03-10 | End: 2025-03-10

## 2025-03-10 NOTE — PROGRESS NOTES
Harsha Liu 54 y.o. male was seen by TAMIKO Myers on 3/10/2025     Wt Readings from Last 3 Encounters:   03/10/25 120.6 kg (265 lb 12.8 oz)   01/31/25 124.9 kg (275 lb 6.4 oz)   01/16/25 120.7 kg (266 lb)       HPI  Harsha Liu is a pleasant 54 y.o.  male who presents today for follow-up on alcohol cirrhosis and history multiple colon polyps.  His EGD/colonoscopy done on 8-5-2024.  His EGD showed normal esophagus, gastric varices without bleeding, gastritis, and normal duodenal bulb and second portion of the duodenum.  His stomach biopsies showed mild chronic gastritis with no evidence of H. Pylori infection.  His colonoscopy showed one 20 mm tubulovillous adenomatous polyp removed from ascending colon by piecemeal, one 7 mm adenomatous polyp removed from ascending colon, one 2 mm adenomatous polyp removed from ascending colon, one 30 mm adenomatous polyp removed from sigmoid colon; otherwise normal colon.   He was recently started on Tramadol with plans to  have hip sugery in April.  He stopped drinking alcohol on July 31, 2024 and is on antabuse.  He is taking Nadolol once per day.   No typical heartburn symptoms.  He denies NSAID use.  CT scan of abdomen/pelvis done on 8-5-2024 revealed cirrhotic morphology of the liver with portal hypertension and gastroesophageal varices.  He is taking Nadolol.  His abdominal ultrasound done on 3- showed moderately diffuse increased echogenicity throughout the liver and mild hepatic enlargement without focal mass or ductal dilation, normal-appearing gallbladder, and normal sonogram of the right kidney and retroperitoneum.  His lab work done on 8- showed Total Bilirubin 0.5, AST 26, ALT 26, and Alkaline Phosphatase 100.  No jaundice, foggy brain, no leg swelling, or increase in abdominal girth.  His appetite is good and weight is stable.  No nausea or vomiting.  No abdominal pain, bloating or distention.  No heartburn or acid reflux.  No

## 2025-03-11 PROBLEM — I86.4 GASTRIC VARICES WITHOUT BLEEDING: Status: ACTIVE | Noted: 2024-08-05

## 2025-03-11 PROBLEM — I85.00 ESOPHAGEAL VARICES (HCC): Status: RESOLVED | Noted: 2023-12-12 | Resolved: 2025-03-11

## 2025-03-11 PROBLEM — I86.4 GASTRIC VARICES WITHOUT BLEEDING: Status: ACTIVE | Noted: 2025-03-11

## 2025-03-12 DIAGNOSIS — G89.29 CHRONIC RIGHT HIP PAIN: ICD-10-CM

## 2025-03-12 DIAGNOSIS — M16.11 PRIMARY OSTEOARTHRITIS OF RIGHT HIP: Primary | ICD-10-CM

## 2025-03-12 DIAGNOSIS — M25.551 CHRONIC RIGHT HIP PAIN: ICD-10-CM

## 2025-03-12 LAB — AFP-TM SERPL-MCNC: 2 NG/ML (ref 0–9)

## 2025-03-13 ENCOUNTER — TELEPHONE (OUTPATIENT)
Dept: ORTHOPEDIC SURGERY | Age: 55
End: 2025-03-13

## 2025-03-13 ENCOUNTER — PREP FOR PROCEDURE (OUTPATIENT)
Dept: ORTHOPEDIC SURGERY | Age: 55
End: 2025-03-13

## 2025-03-13 DIAGNOSIS — Z01.818 PREOPERATIVE TESTING: Primary | ICD-10-CM

## 2025-03-13 DIAGNOSIS — M25.551 RIGHT HIP PAIN: ICD-10-CM

## 2025-03-13 DIAGNOSIS — M16.11 PRIMARY OSTEOARTHRITIS OF RIGHT HIP: ICD-10-CM

## 2025-03-13 RX ORDER — SODIUM CHLORIDE 9 MG/ML
INJECTION, SOLUTION INTRAVENOUS PRN
Status: CANCELLED | OUTPATIENT
Start: 2025-03-13

## 2025-03-13 RX ORDER — SODIUM CHLORIDE 0.9 % (FLUSH) 0.9 %
5-40 SYRINGE (ML) INJECTION PRN
Status: CANCELLED | OUTPATIENT
Start: 2025-03-13

## 2025-03-13 RX ORDER — SODIUM CHLORIDE 0.9 % (FLUSH) 0.9 %
5-40 SYRINGE (ML) INJECTION EVERY 12 HOURS SCHEDULED
Status: CANCELLED | OUTPATIENT
Start: 2025-03-13

## 2025-03-13 NOTE — TELEPHONE ENCOUNTER
Scheduled patient for:    Right Anterior Total Hip Arthroplasty  CPT: 32539  ICD 10: M16.11; M25.551  Surgery Date: 4/28/2025  Surgeon: Fozia  Facility: [facility]  Anesthesia: Spinal/Nerve Block  Product: [place]    Physical Therapy: Holt-order created on 3/12/25    Clearances sent to:  PCP: Mirza    Insurance: [facility]  Prior auth started on 3/12/2025 via [facility] online portal, clinicals uploaded  Approved   #[id number]  4/28/25-7/27/25

## 2025-03-15 ENCOUNTER — RESULTS FOLLOW-UP (OUTPATIENT)
Dept: GASTROENTEROLOGY | Age: 55
End: 2025-03-15

## 2025-03-18 RX ORDER — DISULFIRAM 250 MG/1
250 TABLET ORAL NIGHTLY
Qty: 90 TABLET | Refills: 0 | Status: SHIPPED | OUTPATIENT
Start: 2025-03-18

## 2025-03-19 DIAGNOSIS — M16.11 PRIMARY OSTEOARTHRITIS OF RIGHT HIP: Primary | ICD-10-CM

## 2025-04-08 ENCOUNTER — TELEPHONE (OUTPATIENT)
Dept: ORTHOPEDIC SURGERY | Age: 55
End: 2025-04-08

## 2025-04-08 DIAGNOSIS — M16.11 PRIMARY OSTEOARTHRITIS OF RIGHT HIP: Primary | ICD-10-CM

## 2025-04-08 RX ORDER — TRAMADOL HYDROCHLORIDE 50 MG/1
50 TABLET ORAL EVERY 6 HOURS PRN
Qty: 28 TABLET | Refills: 0 | Status: SHIPPED | OUTPATIENT
Start: 2025-04-08 | End: 2025-04-15

## 2025-04-08 NOTE — PROGRESS NOTES
4/8/25 - LM with my call-back # for patient: pre-testing on 4/16/25 between [id number].   Bring insurance card and picture ID. Check in at the information desk in the lobby upon your arrival. You can eat and take morning medications. Please call me back and verify you received this message.

## 2025-04-08 NOTE — TELEPHONE ENCOUNTER
Patient would like refill of Tramadol for hip pain before Right BEATA, DOS 4/28/2025. Please send to [facility]. System updated.

## 2025-04-09 ENCOUNTER — HOSPITAL ENCOUNTER (OUTPATIENT)
Dept: ULTRASOUND IMAGING | Age: 55
Discharge: HOME | End: 2025-04-09
Payer: COMMERCIAL

## 2025-04-09 DIAGNOSIS — K70.30 ALCOHOLIC CIRRHOSIS OF LIVER WITHOUT ASCITES (HCC): ICD-10-CM

## 2025-04-09 PROCEDURE — 76705 ECHO EXAM OF ABDOMEN: CPT

## 2025-04-10 ENCOUNTER — TELEPHONE (OUTPATIENT)
Dept: GASTROENTEROLOGY | Age: 55
End: 2025-04-10

## 2025-04-11 RX ORDER — OMEPRAZOLE 40 MG/1
40 CAPSULE, DELAYED RELEASE ORAL DAILY
Qty: 90 CAPSULE | Refills: 0 | Status: SHIPPED | OUTPATIENT
Start: 2025-04-11

## 2025-04-13 ASSESSMENT — PROMIS GLOBAL HEALTH SCALE
IN THE PAST 7 DAYS, HOW WOULD YOU RATE YOUR FATIGUE ON AVERAGE [ON A SCALE FROM 1 (NONE) TO 5 (VERY SEVERE)]?: MODERATE
IN GENERAL, HOW WOULD YOU RATE YOUR SATISFACTION WITH YOUR SOCIAL ACTIVITIES AND RELATIONSHIPS [ON A SCALE OF 1 (POOR) TO 5 (EXCELLENT)]?: VERY GOOD
IN GENERAL, WOULD YOU SAY YOUR QUALITY OF LIFE IS...[ON A SCALE OF 1 (POOR) TO 5 (EXCELLENT)]: GOOD
SUM OF RESPONSES TO QUESTIONS 3, 6, 7, & 8: 16
IN GENERAL, PLEASE RATE HOW WELL YOU CARRY OUT YOUR USUAL SOCIAL ACTIVITIES (INCLUDES ACTIVITIES AT HOME, AT WORK, AND IN YOUR COMMUNITY, AND RESPONSIBILITIES AS A PARENT, CHILD, SPOUSE, EMPLOYEE, FRIEND, ETC) [ON A SCALE OF 1 (POOR) TO 5 (EXCELLENT)]?: EXCELLENT
IN THE PAST 7 DAYS, HOW WOULD YOU RATE YOUR PAIN ON AVERAGE [ON A SCALE FROM 0 (NO PAIN) TO 10 (WORST IMAGINABLE PAIN)]?: 5
TO WHAT EXTENT ARE YOU ABLE TO CARRY OUT YOUR EVERYDAY PHYSICAL ACTIVITIES SUCH AS WALKING, CLIMBING STAIRS, CARRYING GROCERIES, OR MOVING A CHAIR [ON A SCALE OF 1 (NOT AT ALL) TO 5 (COMPLETELY)]?: COMPLETELY
IN GENERAL, PLEASE RATE HOW WELL YOU CARRY OUT YOUR USUAL SOCIAL ACTIVITIES (INCLUDES ACTIVITIES AT HOME, AT WORK, AND IN YOUR COMMUNITY, AND RESPONSIBILITIES AS A PARENT, CHILD, SPOUSE, EMPLOYEE, FRIEND, ETC) [ON A SCALE OF 1 (POOR) TO 5 (EXCELLENT)]?: EXCELLENT
HOW IS THE PROMIS V1.1 BEING ADMINISTERED?: ELECTRONIC
WHO IS THE PERSON COMPLETING THE PROMIS V1.1 SURVEY?: SELF
IN GENERAL, HOW WOULD YOU RATE YOUR MENTAL HEALTH, INCLUDING YOUR MOOD AND YOUR ABILITY TO THINK [ON A SCALE OF 1 (POOR) TO 5 (EXCELLENT)]?: EXCELLENT
IN THE PAST 7 DAYS, HOW WOULD YOU RATE YOUR PAIN ON AVERAGE [ON A SCALE FROM 0 (NO PAIN) TO 10 (WORST IMAGINABLE PAIN)]?: 5
IN THE PAST 7 DAYS, HOW WOULD YOU RATE YOUR FATIGUE ON AVERAGE [ON A SCALE FROM 1 (NONE) TO 5 (VERY SEVERE)]?: MODERATE
HOW IS THE PROMIS V1.1 BEING ADMINISTERED?: ELECTRONIC
WHO IS THE PERSON COMPLETING THE PROMIS V1.1 SURVEY?: SELF
IN GENERAL, WOULD YOU SAY YOUR HEALTH IS...[ON A SCALE OF 1 (POOR) TO 5 (EXCELLENT)]: GOOD
TO WHAT EXTENT ARE YOU ABLE TO CARRY OUT YOUR EVERYDAY PHYSICAL ACTIVITIES SUCH AS WALKING, CLIMBING STAIRS, CARRYING GROCERIES, OR MOVING A CHAIR [ON A SCALE OF 1 (NOT AT ALL) TO 5 (COMPLETELY)]?: COMPLETELY
IN THE PAST 7 DAYS, HOW OFTEN HAVE YOU BEEN BOTHERED BY EMOTIONAL PROBLEMS, SUCH AS FEELING ANXIOUS, DEPRESSED, OR IRRITABLE [ON A SCALE FROM 1 (NEVER) TO 5 (ALWAYS)]?: NEVER
IN THE PAST 7 DAYS, HOW OFTEN HAVE YOU BEEN BOTHERED BY EMOTIONAL PROBLEMS, SUCH AS FEELING ANXIOUS, DEPRESSED, OR IRRITABLE [ON A SCALE FROM 1 (NEVER) TO 5 (ALWAYS)]?: NEVER
IN GENERAL, HOW WOULD YOU RATE YOUR PHYSICAL HEALTH [ON A SCALE OF 1 (POOR) TO 5 (EXCELLENT)]?: GOOD
IN GENERAL, HOW WOULD YOU RATE YOUR SATISFACTION WITH YOUR SOCIAL ACTIVITIES AND RELATIONSHIPS [ON A SCALE OF 1 (POOR) TO 5 (EXCELLENT)]?: VERY GOOD
SUM OF RESPONSES TO QUESTIONS 2, 4, 5, & 10: 17

## 2025-04-14 ENCOUNTER — RESULTS FOLLOW-UP (OUTPATIENT)
Dept: GASTROENTEROLOGY | Age: 55
End: 2025-04-14

## 2025-04-14 SDOH — ECONOMIC STABILITY: FOOD INSECURITY: WITHIN THE PAST 12 MONTHS, THE FOOD YOU BOUGHT JUST DIDN'T LAST AND YOU DIDN'T HAVE MONEY TO GET MORE.: NEVER TRUE

## 2025-04-14 SDOH — ECONOMIC STABILITY: FOOD INSECURITY: WITHIN THE PAST 12 MONTHS, YOU WORRIED THAT YOUR FOOD WOULD RUN OUT BEFORE YOU GOT MONEY TO BUY MORE.: NEVER TRUE

## 2025-04-14 SDOH — ECONOMIC STABILITY: INCOME INSECURITY: IN THE LAST 12 MONTHS, WAS THERE A TIME WHEN YOU WERE NOT ABLE TO PAY THE MORTGAGE OR RENT ON TIME?: NO

## 2025-04-14 SDOH — ECONOMIC STABILITY: TRANSPORTATION INSECURITY
IN THE PAST 12 MONTHS, HAS THE LACK OF TRANSPORTATION KEPT YOU FROM MEDICAL APPOINTMENTS OR FROM GETTING MEDICATIONS?: NO

## 2025-04-14 SDOH — ECONOMIC STABILITY: TRANSPORTATION INSECURITY
IN THE PAST 12 MONTHS, HAS LACK OF TRANSPORTATION KEPT YOU FROM MEETINGS, WORK, OR FROM GETTING THINGS NEEDED FOR DAILY LIVING?: NO

## 2025-04-15 ENCOUNTER — CLINICAL SUPPORT (OUTPATIENT)
Dept: ORTHOPEDIC SURGERY | Age: 55
End: 2025-04-15

## 2025-04-15 ENCOUNTER — OFFICE VISIT (OUTPATIENT)
Dept: FAMILY MEDICINE CLINIC | Age: 55
End: 2025-04-15
Payer: COMMERCIAL

## 2025-04-15 VITALS
OXYGEN SATURATION: 96 % | SYSTOLIC BLOOD PRESSURE: 128 MMHG | TEMPERATURE: 97.2 F | DIASTOLIC BLOOD PRESSURE: 76 MMHG | BODY MASS INDEX: 35.89 KG/M2 | HEART RATE: 60 BPM | WEIGHT: 264.6 LBS

## 2025-04-15 DIAGNOSIS — K76.6 PORTAL HYPERTENSION (HCC): Primary | ICD-10-CM

## 2025-04-15 DIAGNOSIS — M16.11 ARTHRITIS OF RIGHT HIP: ICD-10-CM

## 2025-04-15 DIAGNOSIS — E78.2 MIXED HYPERLIPIDEMIA: ICD-10-CM

## 2025-04-15 PROCEDURE — 99214 OFFICE O/P EST MOD 30 MIN: CPT | Performed by: INTERNAL MEDICINE

## 2025-04-15 ASSESSMENT — HOOS JR
SITTING: MODERATE
RISING FROM SITTING: SEVERE
BENDING TO THE FLOOR TO PICK UP OBJECT: SEVERE
GOING UP OR DOWN STAIRS: MODERATE
HOOS JR RAW SCORE: 15
HOOS JR RAW SCORE: 15
LYING IN BED (TURNING OVER, MAINTAINING HIP POSITION): MODERATE
WALKING ON UNEVEN SURFACE: SEVERE
HOOS JR TOTAL INTERVAL SCORE: 43.335

## 2025-04-15 NOTE — PROGRESS NOTES
Outpatient Clinic Visit Note    Patient: Harsha Liu  : 1970 (54 y.o.)  Date: 4/15/2025    CC:  Chief Complaint   Patient presents with    3 Month Follow-Up        HPI: he stopped the lipitor because he was having muscle cramps.   He is feeling generally well.  He is staying off alcohol and continuing with antabuse.    He has not had any cardiac or neuro events in the last 6 month.  He is due for hip repair in a few weeks with Dr Marcelo.  Right hip..       Past Medical History:    Past Medical History:   Diagnosis Date    Anxiety     GERD (gastroesophageal reflux disease)     Hernia, inguinal, left     Liver disease 3/16/23       Past Surgical History:  Past Surgical History:   Procedure Laterality Date    COLONOSCOPY N/A 2024    COLONOSCOPY POLYPECTOMY ABLATION WITH EPI INJECTION HEMACLIP X 8 performed by Polo Marks MD at [facility] ENDOSCOPY    CYST REMOVAL Left     LEFT ARM    HERNIA REPAIR      left inguinal hernia- Dr. Vanegas     UPPER GASTROINTESTINAL ENDOSCOPY N/A 2024    ESOPHAGOGASTRODUODENOSCOPY BIOPSY performed by Polo Marks MD at [facility] ENDOSCOPY       Home Medications:  Current Outpatient Medications   Medication Sig Dispense Refill    omeprazole (PRILOSEC) 40 MG delayed release capsule Take 1 capsule by mouth daily 90 capsule 0    traMADol (ULTRAM) 50 MG tablet Take 1 tablet by mouth every 6 hours as needed for Pain for up to 7 days. Intended supply: 7 days. Take lowest dose possible to manage pain Max Daily Amount: 200 mg 28 tablet 0    disulfiram (ANTABUSE) 250 MG tablet Take 1 tablet by mouth at bedtime as directed 90 tablet 0    lidocaine 4 % external patch Place 1 patch onto the skin daily 30 patch 1    nadolol (CORGARD) 20 MG tablet Take 1 tablet by mouth daily 90 tablet 1    thiamine 100 MG tablet Take 1 tablet by mouth daily 90 tablet 1    vitamin B-1 (THIAMINE) 100 MG tablet Take 1 tablet by mouth daily      Multiple Vitamins-Minerals (THERAPEUTIC MULTIVITAMIN-MINERALS)

## 2025-04-15 NOTE — PROGRESS NOTES
QUYEN, Right BEATA (A), DOS 4/28/2025    Patient would like to discharge from [facility] with orders to outpatient PT.     All questions and concerns of the patient's have been answered at this time to the best of my ability. Patient is aware they may contact this nurse at her direct number given to them at anytime with questions or concerns moving forward.     CHG bathing soap given to patient with instructions.     Present in appt with patient today is:  No one     Walker order and handicap letter were given to patient during appointment.     Post op appts were reviewed with patient as well.

## 2025-04-15 NOTE — PROGRESS NOTES
4/15/25 -  reminding of PAT tomorrow and asked for a call-back # concerning  surgery @ [facility] on 4/28/25.

## 2025-04-15 NOTE — PROGRESS NOTES
Patient aware of PAT tomorrow 4/16/25. PAT complete    Surgery @ [facility] on 4/28/25 you will be called 4/25/25 with times    NOTHING TO EAT OR DRINK AFTER MIDNIGHT DAY OF SURGERY    1. Enter thru the hospital main entrance on day of surgery, check in at the Information Desk. If you arrive prior to 6:00am, enter thru the ER entrance.    2. Follow the directions as prescribed by the doctor for your procedure and medications.         Morning of surgery take:   Nadolol & Omeprazole with sips of water         Stop vitamins, supplements and NSAIDS:  4/21/25    3. Check with your Doctor regarding stopping blood thinners and follow their instructions.    4. Do not smoke, vape or use chewing tobacco morning of surgery. Do not drink any alcoholic beverages 24 hours prior to surgery.       This includes NA Beer. No street drugs 7 days prior to surgery.  No marijuana 24-48 hours prior to surgery.    5. If you have dentures, contacts of glasses they will be removed before going to the OR; please bring a case.    6. Please bring picture ID, insurance card, paperwork from the doctor’s office (H & P, Consent, & card for implantable devices).    7. Take a shower with an antibacterial soap the night before surgery and the morning of surgery. Do not put anything on your skin      After your morning shower.    8. You will need a responsible adult to drive you home and check on you after surgery.

## 2025-04-16 ENCOUNTER — HOSPITAL ENCOUNTER (OUTPATIENT)
Dept: PREADMISSION TESTING | Age: 55
Discharge: HOME | End: 2025-04-20
Payer: COMMERCIAL

## 2025-04-16 ENCOUNTER — HOSPITAL ENCOUNTER (OUTPATIENT)
Dept: GENERAL RADIOLOGY | Age: 55
Discharge: HOME | End: 2025-04-16
Payer: COMMERCIAL

## 2025-04-16 ENCOUNTER — HOSPITAL ENCOUNTER (OUTPATIENT)
Age: 55
Discharge: HOME | End: 2025-04-18
Payer: COMMERCIAL

## 2025-04-16 ENCOUNTER — HOSPITAL ENCOUNTER (OUTPATIENT)
Age: 55
Discharge: HOME | End: 2025-04-16
Payer: COMMERCIAL

## 2025-04-16 DIAGNOSIS — Z01.818 PREOPERATIVE TESTING: ICD-10-CM

## 2025-04-16 LAB
ALBUMIN SERPL-MCNC: 3.8 G/DL (ref 3.4–5)
ALBUMIN/GLOB SERPL: 1.4 {RATIO} (ref 1.1–2.2)
ALP SERPL-CCNC: 103 U/L (ref 40–129)
ALT SERPL-CCNC: 31 U/L (ref 10–40)
ANION GAP SERPL CALCULATED.3IONS-SCNC: 8 MMOL/L (ref 9–17)
AST SERPL-CCNC: 26 U/L (ref 15–37)
BASOPHILS # BLD: 0.07 K/UL
BASOPHILS NFR BLD: 1 % (ref 0–1)
BILIRUB SERPL-MCNC: 0.4 MG/DL (ref 0–1)
BILIRUB UR QL STRIP: NEGATIVE
BUN SERPL-MCNC: 13 MG/DL (ref 7–20)
CALCIUM SERPL-MCNC: 9 MG/DL (ref 8.3–10.6)
CHLORIDE SERPL-SCNC: 109 MMOL/L (ref 99–110)
CLARITY UR: CLEAR
CO2 SERPL-SCNC: 25 MMOL/L (ref 21–32)
COLOR UR: YELLOW
COMMENT: ABNORMAL
CREAT SERPL-MCNC: 0.9 MG/DL (ref 0.9–1.3)
EKG ATRIAL RATE: 58 BPM
EKG DIAGNOSIS: NORMAL
EKG P AXIS: 28 DEGREES
EKG P-R INTERVAL: 184 MS
EKG Q-T INTERVAL: 398 MS
EKG QRS DURATION: 94 MS
EKG QTC CALCULATION (BAZETT): 390 MS
EKG R AXIS: 45 DEGREES
EKG T AXIS: 31 DEGREES
EKG VENTRICULAR RATE: 58 BPM
EOSINOPHIL # BLD: 0.17 K/UL
EOSINOPHILS RELATIVE PERCENT: 3 % (ref 0–3)
ERYTHROCYTE [DISTWIDTH] IN BLOOD BY AUTOMATED COUNT: 13.7 % (ref 11.7–14.9)
GFR, ESTIMATED: 85 ML/MIN/1.73M2
GLUCOSE SERPL-MCNC: 137 MG/DL (ref 74–99)
GLUCOSE UR STRIP-MCNC: NEGATIVE MG/DL
HCT VFR BLD AUTO: 45.1 % (ref 42–52)
HGB BLD-MCNC: 15.3 G/DL (ref 13.5–18)
HGB UR QL STRIP.AUTO: NEGATIVE
IMM GRANULOCYTES # BLD AUTO: 0.05 K/UL
IMM GRANULOCYTES NFR BLD: 1 %
KETONES UR STRIP-MCNC: NEGATIVE MG/DL
LEUKOCYTE ESTERASE UR QL STRIP: NEGATIVE
LYMPHOCYTES NFR BLD: 1.73 K/UL
LYMPHOCYTES RELATIVE PERCENT: 28 % (ref 24–44)
MCH RBC QN AUTO: 28.8 PG (ref 27–31)
MCHC RBC AUTO-ENTMCNC: 33.9 G/DL (ref 32–36)
MCV RBC AUTO: 84.9 FL (ref 78–100)
MONOCYTES NFR BLD: 0.61 K/UL
MONOCYTES NFR BLD: 10 % (ref 0–4)
NEUTROPHILS NFR BLD: 57 % (ref 36–66)
NEUTS SEG NFR BLD: 3.53 K/UL
NITRITE UR QL STRIP: NEGATIVE
PH UR STRIP: 5.5 [PH] (ref 5–8)
PLATELET # BLD AUTO: 172 K/UL (ref 140–440)
PMV BLD AUTO: 10.4 FL (ref 7.5–11.1)
POTASSIUM SERPL-SCNC: 4.4 MMOL/L (ref 3.5–5.1)
PROT SERPL-MCNC: 6.4 G/DL (ref 6.4–8.2)
PROT UR STRIP-MCNC: NEGATIVE MG/DL
RBC # BLD AUTO: 5.31 M/UL (ref 4.6–6.2)
SODIUM SERPL-SCNC: 143 MMOL/L (ref 136–145)
SP GR UR STRIP: >1.03 (ref 1–1.03)
UROBILINOGEN UR STRIP-ACNC: 0.2 EU/DL (ref 0–1)
WBC OTHER # BLD: 6.2 K/UL (ref 4–10.5)

## 2025-04-16 PROCEDURE — 81003 URINALYSIS AUTO W/O SCOPE: CPT

## 2025-04-16 PROCEDURE — 71046 X-RAY EXAM CHEST 2 VIEWS: CPT

## 2025-04-16 PROCEDURE — 93005 ELECTROCARDIOGRAM TRACING: CPT

## 2025-04-16 PROCEDURE — 87086 URINE CULTURE/COLONY COUNT: CPT

## 2025-04-16 PROCEDURE — 93010 ELECTROCARDIOGRAM REPORT: CPT | Performed by: INTERNAL MEDICINE

## 2025-04-16 PROCEDURE — 80053 COMPREHEN METABOLIC PANEL: CPT

## 2025-04-16 PROCEDURE — 85025 COMPLETE CBC W/AUTO DIFF WBC: CPT

## 2025-04-17 ENCOUNTER — TELEPHONE (OUTPATIENT)
Dept: CARDIOLOGY CLINIC | Age: 55
End: 2025-04-17

## 2025-04-17 LAB
MICROORGANISM SPEC CULT: NORMAL
SERVICE CMNT-IMP: NORMAL
SPECIMEN DESCRIPTION: NORMAL

## 2025-04-17 NOTE — TELEPHONE ENCOUNTER
Cardiologist: Dr. Mclean  Surgeon: Dr. Marcelo  Surgery: Right total hip arthroplasty  Surgery/Procedure should be done in the hospital setting    _____ yes    _____  no    Anesthesia: Spinal  Date: 4/28/2025  Fax# [phone]  # [phone]    New Patient OV 4/21/2025 w/Vinay      Last EKG- 4/16/2025  Abnormal

## 2025-04-17 NOTE — PROGRESS NOTES
4/17/25 -   Dr. Marcelo's office notified via TEAMS (Isabel) patient has an abnormal EKG during PAT and will need cardiac clearance.

## 2025-04-18 NOTE — PROGRESS NOTES
MRN: [id number]  Name: Harsha iLu  : 1970    Insurance: Payor: [employer] /  /  /      Phone #: [phone]  Provider: Airam Mclean MD     Date of Visit: 2025    Reason for visit:  New Patient cardiac clearance Dr Marcelo/  Recent Hospitalization Date:    Reason for Hospitalization:    Last EKG: 3/2025  Type of Device:       Vitals BP HR O2% WT HT ORTHO BP LYING ORTHO BP SITTING ORTHO BP SITTING   Today's Findings           Patients work up- Check List     Testing Last Date Completed Date Expected  (Jamul One) Additional Notes    MA to document For provider to complete Either MA or Provider    Carotid Duplex  STAT 1 WK 6 MTH       THIS WK 2 WK 1 YEAR     Cardiac CTA  STAT 1 WK 6 MTH       THIS WK 2 WK 1 YEAR     Cardiac CT Calcium scoring  STAT 1 WK 6 MTH       THIS WK 2 WK 1 YEAR     CTA Chest, Abdomen & Pelvis  STAT 1 WK 6 MTH       THIS WK 2 WK 1 YEAR     CT Chest IV w/ Contrast  STAT 1 WK 6 MTH       THIS WK 2 WK 1 YEAR     CT Chest w/o Contrast  STAT 1 WK 6 MTH       THIS WK 2 WK 1 YEAR     CXR 2025 STAT 1 WK 6 MTH       THIS WK 2 WK 1 YEAR     ECHO  Stress Complete Limited     MRI- Cardiac  STAT 1 WK 6 MTH       THIS WK 2 WK 1 YEAR     MUGA Scan  STAT 1 WK 6 MTH       THIS WK 2 WK 1 YEAR     Nuclear Stress  Lexiscan Cardiolite     PFT  STAT 1 WK 6 MTH       THIS WK 2 WK 1 YEAR     Treadmill Stress Test  STAT 1 WK 6 MTH       THIS WK 2 WK 1 YEAR     Vascular Duplex  Lower: Right Left Bilat       Upper: Right Left Bilat     Other Test Not Listed:    Monitors Last Date Completed Day's Request/Ordered     Holter  Short term 24 hours 48 hours      Long term 3 days 7 days 14 days   Event   (1-30 days)      Procedures Last Date Performed Procedure Details Date Expected   Additional Notes    ASD Closure        Carotid Angio        Cardioversion        Heart Cath  R L R&L      Peripheral Angio  R L      PFO Closure        PTCA/PCI        JAMEE        JAMEE/Cardioversion        Venogram

## 2025-04-21 ENCOUNTER — OFFICE VISIT (OUTPATIENT)
Dept: CARDIOLOGY CLINIC | Age: 55
End: 2025-04-21
Payer: COMMERCIAL

## 2025-04-21 VITALS
HEART RATE: 64 BPM | SYSTOLIC BLOOD PRESSURE: 124 MMHG | WEIGHT: 265.8 LBS | HEIGHT: 72 IN | BODY MASS INDEX: 36 KG/M2 | DIASTOLIC BLOOD PRESSURE: 76 MMHG

## 2025-04-21 DIAGNOSIS — Z01.818 PRE-OPERATIVE CLEARANCE: Primary | ICD-10-CM

## 2025-04-21 PROCEDURE — 99204 OFFICE O/P NEW MOD 45 MIN: CPT | Performed by: INTERNAL MEDICINE

## 2025-04-21 PROCEDURE — 93000 ELECTROCARDIOGRAM COMPLETE: CPT | Performed by: INTERNAL MEDICINE

## 2025-04-21 NOTE — PROGRESS NOTES
CLINICAL STAFF DOCUMENTATION      Harsha Liu  1970  [id number]    Have you had any Chest Pain recently? - No          Have you had any Shortness of Breath - No      Have you had any dizziness - No      Have you had any palpitations recently? - No      Do you have any edema - swelling in left leg         When did you have your last labs drawn 04/16/2025  What doctor ordered carson cooper md   Do we have the labs in their chart Yes          Do you need any prescriptions refilled? - No / new patient     Do you have a surgery or procedure scheduled in the near future - Yes / 4/28/2025   If Yes - Who is the surgery with? Dr. Cooper   Phone number of physician  [phone]   Fax number of physician  [phone]   Type of surgery Right total hip arthroplasty   GIVE THIS INFORMATION TO GLORIA MANDEL AND RYAN HERNANDEZ      Caffeine? - Yes  How much caffeine? .1-2  cups  coffee / 1 red bull

## 2025-04-21 NOTE — PROGRESS NOTES
CARDIOLOGY CONSULT NOTE   Reason for consultation:  \preop for hip sx    Referring physician:  No admitting provider for patient encounter.     Primary care physician: John Blue MD      Dear   Thanks for the consult.    History of present illness:Hrasha is a 54 y.o.year old who  presents with cardiac clearance for right hips sx, preop EKG was read abnormal with anterio infarct age inderemined, no chest pain, no shortness of breath dizziness or palpitations.he us x alcohoilc, is on antabuse  Chief Complaint   Patient presents with    Cardiac Clearance    Edema     Blood pressure, cholesterol, blood glucose and weight are well controlled.    Past medical history:    has a past medical history of Anxiety, GERD (gastroesophageal reflux disease), Hernia, inguinal, left, and Liver disease.  Past surgical history:   has a past surgical history that includes cyst removal (Left); hernia repair; Colonoscopy (N/A, 8/5/2024); and Upper gastrointestinal endoscopy (N/A, 8/5/2024).  Social History:   reports that he quit smoking about 19 years ago. His smoking use included cigarettes. He started smoking about 37 years ago. He has a 9 pack-year smoking history. He has never used smokeless tobacco. He reports that he does not currently use alcohol. He reports current drug use. Drug: Marijuana (Weed).  Family history:   no family history of CAD, STROKE of DM    No Known Allergies    No current facility-administered medications for this visit.    Current Outpatient Medications   Medication Sig Dispense Refill    omeprazole (PRILOSEC) 40 MG delayed release capsule Take 1 capsule by mouth daily 90 capsule 0    disulfiram (ANTABUSE) 250 MG tablet Take 1 tablet by mouth at bedtime as directed 90 tablet 0    lidocaine 4 % external patch Place 1 patch onto the skin daily 30 patch 1    nadolol (CORGARD) 20 MG tablet Take 1 tablet by mouth daily 90 tablet 1    thiamine 100 MG tablet Take 1 tablet by mouth daily 90 tablet 1

## 2025-04-23 ENCOUNTER — HOSPITAL ENCOUNTER (OUTPATIENT)
Dept: PHYSICAL THERAPY | Age: 55
Setting detail: THERAPIES SERIES
Discharge: HOME | End: 2025-04-23
Payer: COMMERCIAL

## 2025-04-23 PROCEDURE — 97110 THERAPEUTIC EXERCISES: CPT

## 2025-04-23 PROCEDURE — 97161 PT EVAL LOW COMPLEX 20 MIN: CPT

## 2025-04-23 ASSESSMENT — PAIN DESCRIPTION - PAIN TYPE: TYPE: CHRONIC PAIN

## 2025-04-23 ASSESSMENT — PAIN DESCRIPTION - LOCATION: LOCATION: HIP

## 2025-04-23 ASSESSMENT — PAIN DESCRIPTION - ORIENTATION: ORIENTATION: RIGHT

## 2025-04-23 NOTE — PROGRESS NOTES
Physical Therapy: Initial Evaluation    Patient: Harsha Liu (54 y.o. male)   Examination Date: 2025  Plan of Care Certification Period: 2025 to        :  1970 ;    Confirmed: Yes MRN: [id number]  CSN: [id number]   Insurance: Payor: [employer] / Plan: [facility] [employer] [place] [place] / Product Type: *No Product type* /   Insurance ID: [id number] - (Commercial) Secondary Insurance (if applicable):    Referring Physician: Lance Marcelo MD     PCP: John Blue MD Visits to Date/Visits Approved:   /      No Show/Cancelled Appts:   /       Medical Diagnosis: Primary osteoarthritis of right hip [M16.11]  Chronic right hip pain [M25.551, G89.29]    Treatment Diagnosis: R anterior THR 25 weakness, limited gait , stairs, ROM , pain , swelling     PERTINENT MEDICAL HISTORY   Patient Assessed for Rehabilitation Services: Yes  Self reported health status:: Good    Medical History: Chart Reviewed: Yes   Past Medical History:   Diagnosis Date    Anxiety     GERD (gastroesophageal reflux disease)     Hernia, inguinal, left     Liver disease 3/16/23     Surgical History:   Past Surgical History:   Procedure Laterality Date    COLONOSCOPY N/A 2024    COLONOSCOPY POLYPECTOMY ABLATION WITH EPI INJECTION HEMACLIP X 8 performed by Polo Marks MD at [facility] ENDOSCOPY    CYST REMOVAL Left     LEFT ARM    HERNIA REPAIR      left inguinal hernia- Dr. Vanegas     UPPER GASTROINTESTINAL ENDOSCOPY N/A 2024    ESOPHAGOGASTRODUODENOSCOPY BIOPSY performed by Polo Marks MD at [facility] ENDOSCOPY       Medications:   Current Outpatient Medications:     omeprazole (PRILOSEC) 40 MG delayed release capsule, Take 1 capsule by mouth daily (Patient not taking: Reported on 2025), Disp: 90 capsule, Rfl: 0    omeprazole (PRILOSEC) 40 MG delayed release capsule, Take 1 capsule by mouth daily, Disp: 90 capsule, Rfl: 0    disulfiram (ANTABUSE) 250 MG tablet, Take 1 tablet by mouth at bedtime as directed,

## 2025-04-23 NOTE — PLAN OF CARE
[facility]  [facility]  [address]  [facility] [address]  Dept: [phone]  Dept Fax: [phone]  Loc: [phone]    PHYSICAL THERAPY PLAN OF CARE: INITIAL EVALUATION    Patient: Harsha Liu (54 y.o. male)   Examination Date: 2025  Plan of Care Certification Period: 2025 to        :  1970  MRN: [id number]  CSN: [id number]   Insurance: Payor: [employer] / Plan: [facility] [employer] [place] / Product Type: *No Product type* /   Insurance ID: [id number] - (Commercial) Secondary Insurance (if applicable):    Referring Physician: Lance Marcelo MD     PCP: John Blue MD Visits to Date/Visits Approved:   /      No Show/Cancelled Appts:   /       Medical Diagnosis: Primary osteoarthritis of right hip [M16.11]  Chronic right hip pain [M25.551, G89.29]    Treatment Diagnosis: R anterior THR 25 weakness, limited gait , stairs, ROM , pain , swelling         ASSESSMENT     Impression: Assessment: Pt is a  that stands at work for 8-9 hours per day, he also likes to hunt and fish from a boat in [facility] [place]. He has OA and felipe in the R hip and is having a R THR with an anterior approach 25 before returning to therapy.  He does live in a single floor ranch with his wife and has a walker and cane but also has 3 entry steps with no rails at this time he is considering adding them prior to surgery.  Pt would benefit from skilled therapy interventions as needed to address listed impairments, progress toward goal completion and improve ADL/IADL status.  PT also warranted to reduce risk for further injury or decline.    Body Structures, Functions, Activity Limitations Requiring Skilled Therapeutic Intervention: Decreased functional mobility , Decreased ADL status, Decreased ROM, Decreased tolerance to work activity, Decreased strength, Decreased balance, Increased pain, Decreased endurance    Statement of Medical

## 2025-04-23 NOTE — FLOWSHEET NOTE
therapy.  He does live in a single floor ranch with his wife and has a walker and cane but also has 3 entry steps with no rails at this time he is considering adding them prior to surgery.  Pt would benefit from skilled therapy interventions as needed to address listed impairments, progress toward goal completion and improve ADL/IADL status.  PT also warranted to reduce risk for further injury or decline.        Subjective:  See eval         Any changes in Ambulatory Summary Sheet?  None        Objective:  See eval           Exercises: (No more than 4 columns)   R THR ant 4/28/25  Exercise/Equipment 1# 4/23/25 Date Date           WARM UP         NuStep            TABLE      Glute set *10      Quad set *10     AP *10     LAQ *10                                      STANDING      Weight shift AP and lat *10                                               PROPRIOCEPTION                                    MODALITIES                      Other Therapeutic Activities/Education:  Patient received education on their current pathology and how their condition effects them with their functional activities. Patient understood discussion and questions were answered. Patient understands their activity limitations and understands rational for treatment progression.       Home Exercise Program:  Pt completed 1 set of HEP in clinic with instruction per HEP sheet dated today. Appeared to understand program. Any questions clarified.      Access Code: [email or web address]  URL: [email or web address]  Date: 04/23/2025  Prepared by: Virgilio Love    Exercises  - Supine Gluteal Sets  - 3 x daily - 7 x weekly - 1 sets - 10 reps  - Supine Ankle Pumps  - 3 x daily - 7 x weekly - 1 sets - 10 reps  - Supine Quad Set  - 3 x daily - 7 x weekly - 1 sets - 10 reps  - Seated Long Arc Quad  - 3 x daily - 7 x weekly - 1 sets - 10 reps  - Standing Weight Shift Side to Side  - 3 x daily - 7 x weekly - 1 sets - 10 reps  - Stride Stance Weight Shift  - 3 x

## 2025-04-24 ENCOUNTER — ANESTHESIA EVENT (OUTPATIENT)
Dept: OPERATING ROOM | Age: 55
End: 2025-04-24
Payer: COMMERCIAL

## 2025-04-25 NOTE — ANESTHESIA PRE PROCEDURE
Department of Anesthesiology  Preprocedure Note       Name:  Harsha Liu   Age:  54 y.o.  :  1970                                          MRN:  [id number]         Date:  2025      Surgeon: Surgeon(s):  Lance Marcelo MD    Procedure: Procedure(s):  HIP TOTAL ARTHROPLASTY ANTERIOR APPROACH    Medications prior to admission:   Prior to Admission medications    Medication Sig Start Date End Date Taking? Authorizing Provider   omeprazole (PRILOSEC) 40 MG delayed release capsule Take 1 capsule by mouth daily  Patient not taking: Reported on 2025   Polo Marks MD   omeprazole (PRILOSEC) 40 MG delayed release capsule Take 1 capsule by mouth daily 25   Polo Marks MD   disulfiram (ANTABUSE) 250 MG tablet Take 1 tablet by mouth at bedtime as directed 3/18/25   John Blue MD   lidocaine 4 % external patch Place 1 patch onto the skin daily 3/6/25 5/5/25  Lance Marcelo MD   nadolol (CORGARD) 20 MG tablet Take 1 tablet by mouth daily 25   John Blue MD   thiamine 100 MG tablet Take 1 tablet by mouth daily 25   John Blue MD   vitamin B-1 (THIAMINE) 100 MG tablet Take 1 tablet by mouth daily    ProviderDm MD   Multiple Vitamins-Minerals (THERAPEUTIC MULTIVITAMIN-MINERALS) tablet Take 1 tablet by mouth daily    Dm Pickens MD   folic acid (FOLVITE) 400 MCG tablet Take 1 tablet by mouth daily Takes OTC    ProviderDm MD       Current medications:    No current facility-administered medications for this encounter.     Current Outpatient Medications   Medication Sig Dispense Refill   • omeprazole (PRILOSEC) 40 MG delayed release capsule Take 1 capsule by mouth daily (Patient not taking: Reported on 2025) 90 capsule 0   • omeprazole (PRILOSEC) 40 MG delayed release capsule Take 1 capsule by mouth daily 90 capsule 0   • disulfiram (ANTABUSE) 250 MG tablet Take 1 tablet by mouth at bedtime as directed 90 tablet 0

## 2025-04-25 NOTE — PROGRESS NOTES
4/25/25 -  for patient: surgery @ [facility] on  4/28/25 @ 0730, arrival 0600. NPO status and morning medications reviewed. Please call with any questions.

## 2025-04-28 ENCOUNTER — HOSPITAL ENCOUNTER (OUTPATIENT)
Age: 55
Setting detail: OBSERVATION
Discharge: HOME | End: 2025-04-29
Attending: ORTHOPAEDIC SURGERY | Admitting: ORTHOPAEDIC SURGERY
Payer: COMMERCIAL

## 2025-04-28 ENCOUNTER — ANESTHESIA (OUTPATIENT)
Dept: OPERATING ROOM | Age: 55
End: 2025-04-28
Payer: COMMERCIAL

## 2025-04-28 ENCOUNTER — APPOINTMENT (OUTPATIENT)
Dept: GENERAL RADIOLOGY | Age: 55
End: 2025-04-28
Attending: ORTHOPAEDIC SURGERY
Payer: COMMERCIAL

## 2025-04-28 DIAGNOSIS — Z96.641 STATUS POST TOTAL HIP REPLACEMENT, RIGHT: Primary | ICD-10-CM

## 2025-04-28 PROCEDURE — 2500000003 HC RX 250 WO HCPCS: Performed by: ORTHOPAEDIC SURGERY

## 2025-04-28 PROCEDURE — 72170 X-RAY EXAM OF PELVIS: CPT

## 2025-04-28 PROCEDURE — C1776 JOINT DEVICE (IMPLANTABLE): HCPCS | Performed by: ORTHOPAEDIC SURGERY

## 2025-04-28 PROCEDURE — G0378 HOSPITAL OBSERVATION PER HR: HCPCS

## 2025-04-28 PROCEDURE — 6360000002 HC RX W HCPCS: Performed by: ANESTHESIOLOGY

## 2025-04-28 PROCEDURE — 3700000000 HC ANESTHESIA ATTENDED CARE: Performed by: ORTHOPAEDIC SURGERY

## 2025-04-28 PROCEDURE — 7100000000 HC PACU RECOVERY - FIRST 15 MIN: Performed by: ORTHOPAEDIC SURGERY

## 2025-04-28 PROCEDURE — 2580000003 HC RX 258: Performed by: ORTHOPAEDIC SURGERY

## 2025-04-28 PROCEDURE — 2720000010 HC SURG SUPPLY STERILE: Performed by: ORTHOPAEDIC SURGERY

## 2025-04-28 PROCEDURE — 97116 GAIT TRAINING THERAPY: CPT

## 2025-04-28 PROCEDURE — 3600000006 HC SURGERY LEVEL 6 BASE: Performed by: ORTHOPAEDIC SURGERY

## 2025-04-28 PROCEDURE — 2500000003 HC RX 250 WO HCPCS

## 2025-04-28 PROCEDURE — 2580000003 HC RX 258

## 2025-04-28 PROCEDURE — 3700000001 HC ADD 15 MINUTES (ANESTHESIA): Performed by: ORTHOPAEDIC SURGERY

## 2025-04-28 PROCEDURE — P9045 ALBUMIN (HUMAN), 5%, 250 ML: HCPCS

## 2025-04-28 PROCEDURE — 7100000001 HC PACU RECOVERY - ADDTL 15 MIN: Performed by: ORTHOPAEDIC SURGERY

## 2025-04-28 PROCEDURE — 64447 NJX AA&/STRD FEMORAL NRV IMG: CPT | Performed by: ANESTHESIOLOGY

## 2025-04-28 PROCEDURE — 2709999900 HC NON-CHARGEABLE SUPPLY: Performed by: ORTHOPAEDIC SURGERY

## 2025-04-28 PROCEDURE — 76000 FLUOROSCOPY <1 HR PHYS/QHP: CPT

## 2025-04-28 PROCEDURE — 6360000002 HC RX W HCPCS

## 2025-04-28 PROCEDURE — 27130 TOTAL HIP ARTHROPLASTY: CPT

## 2025-04-28 PROCEDURE — 3600000016 HC SURGERY LEVEL 6 ADDTL 15MIN: Performed by: ORTHOPAEDIC SURGERY

## 2025-04-28 PROCEDURE — 6370000000 HC RX 637 (ALT 250 FOR IP): Performed by: STUDENT IN AN ORGANIZED HEALTH CARE EDUCATION/TRAINING PROGRAM

## 2025-04-28 PROCEDURE — 97162 PT EVAL MOD COMPLEX 30 MIN: CPT

## 2025-04-28 PROCEDURE — 27130 TOTAL HIP ARTHROPLASTY: CPT | Performed by: ORTHOPAEDIC SURGERY

## 2025-04-28 PROCEDURE — 6360000002 HC RX W HCPCS: Performed by: ORTHOPAEDIC SURGERY

## 2025-04-28 PROCEDURE — 6370000000 HC RX 637 (ALT 250 FOR IP): Performed by: ORTHOPAEDIC SURGERY

## 2025-04-28 PROCEDURE — C1713 ANCHOR/SCREW BN/BN,TIS/BN: HCPCS | Performed by: ORTHOPAEDIC SURGERY

## 2025-04-28 RX ORDER — ENOXAPARIN SODIUM 100 MG/ML
30 INJECTION SUBCUTANEOUS 2 TIMES DAILY
Status: DISCONTINUED | OUTPATIENT
Start: 2025-04-29 | End: 2025-04-29 | Stop reason: HOSPADM

## 2025-04-28 RX ORDER — SODIUM CHLORIDE 0.9 % (FLUSH) 0.9 %
5-40 SYRINGE (ML) INJECTION PRN
Status: DISCONTINUED | OUTPATIENT
Start: 2025-04-28 | End: 2025-04-28 | Stop reason: HOSPADM

## 2025-04-28 RX ORDER — ATROPINE SULFATE 0.1 MG/ML
INJECTION INTRAVENOUS
Status: DISCONTINUED | OUTPATIENT
Start: 2025-04-28 | End: 2025-04-28 | Stop reason: SDUPTHER

## 2025-04-28 RX ORDER — SODIUM CHLORIDE 0.9 % (FLUSH) 0.9 %
5-40 SYRINGE (ML) INJECTION EVERY 12 HOURS SCHEDULED
Status: DISCONTINUED | OUTPATIENT
Start: 2025-04-28 | End: 2025-04-28 | Stop reason: HOSPADM

## 2025-04-28 RX ORDER — SODIUM CHLORIDE 0.9 % (FLUSH) 0.9 %
5-40 SYRINGE (ML) INJECTION EVERY 12 HOURS SCHEDULED
Status: DISCONTINUED | OUTPATIENT
Start: 2025-04-28 | End: 2025-04-29 | Stop reason: HOSPADM

## 2025-04-28 RX ORDER — VASOPRESSIN 20 [USP'U]/ML
INJECTION, SOLUTION INTRAVENOUS
Status: DISCONTINUED | OUTPATIENT
Start: 2025-04-28 | End: 2025-04-28 | Stop reason: SDUPTHER

## 2025-04-28 RX ORDER — ENOXAPARIN SODIUM 100 MG/ML
40 INJECTION SUBCUTANEOUS DAILY
Status: DISCONTINUED | OUTPATIENT
Start: 2025-04-29 | End: 2025-04-28

## 2025-04-28 RX ORDER — MIDAZOLAM HYDROCHLORIDE 1 MG/ML
INJECTION, SOLUTION INTRAMUSCULAR; INTRAVENOUS
Status: DISCONTINUED | OUTPATIENT
Start: 2025-04-28 | End: 2025-04-28 | Stop reason: SDUPTHER

## 2025-04-28 RX ORDER — ONDANSETRON 4 MG/1
4 TABLET, ORALLY DISINTEGRATING ORAL EVERY 8 HOURS PRN
Status: DISCONTINUED | OUTPATIENT
Start: 2025-04-28 | End: 2025-04-29 | Stop reason: HOSPADM

## 2025-04-28 RX ORDER — NADOLOL 20 MG/1
20 TABLET ORAL DAILY
Status: DISCONTINUED | OUTPATIENT
Start: 2025-04-29 | End: 2025-04-29 | Stop reason: HOSPADM

## 2025-04-28 RX ORDER — SODIUM CHLORIDE 9 MG/ML
INJECTION, SOLUTION INTRAVENOUS PRN
Status: DISCONTINUED | OUTPATIENT
Start: 2025-04-28 | End: 2025-04-28 | Stop reason: HOSPADM

## 2025-04-28 RX ORDER — HYDRALAZINE HYDROCHLORIDE 20 MG/ML
10 INJECTION INTRAMUSCULAR; INTRAVENOUS
Status: DISCONTINUED | OUTPATIENT
Start: 2025-04-28 | End: 2025-04-28 | Stop reason: HOSPADM

## 2025-04-28 RX ORDER — NALOXONE HYDROCHLORIDE 0.4 MG/ML
INJECTION, SOLUTION INTRAMUSCULAR; INTRAVENOUS; SUBCUTANEOUS PRN
Status: DISCONTINUED | OUTPATIENT
Start: 2025-04-28 | End: 2025-04-28 | Stop reason: HOSPADM

## 2025-04-28 RX ORDER — PROPOFOL 10 MG/ML
INJECTION, EMULSION INTRAVENOUS
Status: DISCONTINUED | OUTPATIENT
Start: 2025-04-28 | End: 2025-04-28 | Stop reason: SDUPTHER

## 2025-04-28 RX ORDER — LANOLIN ALCOHOL/MO/W.PET/CERES
100 CREAM (GRAM) TOPICAL DAILY
Status: DISCONTINUED | OUTPATIENT
Start: 2025-04-28 | End: 2025-04-29 | Stop reason: HOSPADM

## 2025-04-28 RX ORDER — PROCHLORPERAZINE EDISYLATE 5 MG/ML
5 INJECTION INTRAMUSCULAR; INTRAVENOUS
Status: DISCONTINUED | OUTPATIENT
Start: 2025-04-28 | End: 2025-04-28 | Stop reason: HOSPADM

## 2025-04-28 RX ORDER — ONDANSETRON 2 MG/ML
4 INJECTION INTRAMUSCULAR; INTRAVENOUS
Status: COMPLETED | OUTPATIENT
Start: 2025-04-28 | End: 2025-04-28

## 2025-04-28 RX ORDER — HYDROMORPHONE HYDROCHLORIDE 1 MG/ML
0.25 INJECTION, SOLUTION INTRAMUSCULAR; INTRAVENOUS; SUBCUTANEOUS
Status: DISCONTINUED | OUTPATIENT
Start: 2025-04-28 | End: 2025-04-29 | Stop reason: HOSPADM

## 2025-04-28 RX ORDER — FENTANYL CITRATE 50 UG/ML
INJECTION, SOLUTION INTRAMUSCULAR; INTRAVENOUS
Status: DISCONTINUED | OUTPATIENT
Start: 2025-04-28 | End: 2025-04-28

## 2025-04-28 RX ORDER — EPHEDRINE SULFATE 50 MG/ML
INJECTION INTRAVENOUS
Status: DISCONTINUED | OUTPATIENT
Start: 2025-04-28 | End: 2025-04-28 | Stop reason: SDUPTHER

## 2025-04-28 RX ORDER — FENTANYL CITRATE 50 UG/ML
25 INJECTION, SOLUTION INTRAMUSCULAR; INTRAVENOUS EVERY 5 MIN PRN
Status: DISCONTINUED | OUTPATIENT
Start: 2025-04-28 | End: 2025-04-28 | Stop reason: HOSPADM

## 2025-04-28 RX ORDER — SODIUM CHLORIDE, SODIUM LACTATE, POTASSIUM CHLORIDE, CALCIUM CHLORIDE 600; 310; 30; 20 MG/100ML; MG/100ML; MG/100ML; MG/100ML
INJECTION, SOLUTION INTRAVENOUS
Status: DISCONTINUED | OUTPATIENT
Start: 2025-04-28 | End: 2025-04-28 | Stop reason: SDUPTHER

## 2025-04-28 RX ORDER — SODIUM CHLORIDE 9 MG/ML
INJECTION, SOLUTION INTRAVENOUS PRN
Status: DISCONTINUED | OUTPATIENT
Start: 2025-04-28 | End: 2025-04-29 | Stop reason: HOSPADM

## 2025-04-28 RX ORDER — M-VIT,TX,IRON,MINS/CALC/FOLIC 27MG-0.4MG
1 TABLET ORAL DAILY
Status: DISCONTINUED | OUTPATIENT
Start: 2025-04-28 | End: 2025-04-29 | Stop reason: HOSPADM

## 2025-04-28 RX ORDER — MIDAZOLAM HYDROCHLORIDE 2 MG/2ML
2 INJECTION, SOLUTION INTRAMUSCULAR; INTRAVENOUS ONCE
Status: COMPLETED | OUTPATIENT
Start: 2025-04-28 | End: 2025-04-28

## 2025-04-28 RX ORDER — SODIUM CHLORIDE 0.9 % (FLUSH) 0.9 %
5-40 SYRINGE (ML) INJECTION PRN
Status: DISCONTINUED | OUTPATIENT
Start: 2025-04-28 | End: 2025-04-29 | Stop reason: HOSPADM

## 2025-04-28 RX ORDER — FENTANYL CITRATE 50 UG/ML
INJECTION, SOLUTION INTRAMUSCULAR; INTRAVENOUS
Status: COMPLETED | OUTPATIENT
Start: 2025-04-28 | End: 2025-04-28

## 2025-04-28 RX ORDER — BUPIVACAINE HYDROCHLORIDE 7.5 MG/ML
INJECTION, SOLUTION INTRASPINAL
Status: DISCONTINUED | OUTPATIENT
Start: 2025-04-28 | End: 2025-04-28 | Stop reason: SDUPTHER

## 2025-04-28 RX ORDER — SODIUM CHLORIDE, SODIUM LACTATE, POTASSIUM CHLORIDE, CALCIUM CHLORIDE 600; 310; 30; 20 MG/100ML; MG/100ML; MG/100ML; MG/100ML
INJECTION, SOLUTION INTRAVENOUS CONTINUOUS
Status: DISCONTINUED | OUTPATIENT
Start: 2025-04-28 | End: 2025-04-28

## 2025-04-28 RX ORDER — FOLIC ACID 1 MG/1
500 TABLET ORAL DAILY
Status: DISCONTINUED | OUTPATIENT
Start: 2025-04-28 | End: 2025-04-29 | Stop reason: HOSPADM

## 2025-04-28 RX ORDER — ROPIVACAINE HYDROCHLORIDE 5 MG/ML
INJECTION, SOLUTION EPIDURAL; INFILTRATION; PERINEURAL
Status: COMPLETED
Start: 2025-04-28 | End: 2025-04-28

## 2025-04-28 RX ORDER — HYDROMORPHONE HYDROCHLORIDE 1 MG/ML
0.5 INJECTION, SOLUTION INTRAMUSCULAR; INTRAVENOUS; SUBCUTANEOUS
Status: DISCONTINUED | OUTPATIENT
Start: 2025-04-28 | End: 2025-04-29 | Stop reason: HOSPADM

## 2025-04-28 RX ORDER — ALBUMIN HUMAN 50 G/1000ML
SOLUTION INTRAVENOUS
Status: DISCONTINUED | OUTPATIENT
Start: 2025-04-28 | End: 2025-04-28 | Stop reason: SDUPTHER

## 2025-04-28 RX ORDER — OXYCODONE AND ACETAMINOPHEN 5; 325 MG/1; MG/1
2 TABLET ORAL EVERY 4 HOURS PRN
Status: DISCONTINUED | OUTPATIENT
Start: 2025-04-28 | End: 2025-04-29 | Stop reason: HOSPADM

## 2025-04-28 RX ORDER — OXYCODONE AND ACETAMINOPHEN 5; 325 MG/1; MG/1
1 TABLET ORAL EVERY 4 HOURS PRN
Status: DISCONTINUED | OUTPATIENT
Start: 2025-04-28 | End: 2025-04-29 | Stop reason: HOSPADM

## 2025-04-28 RX ORDER — ONDANSETRON 2 MG/ML
4 INJECTION INTRAMUSCULAR; INTRAVENOUS EVERY 6 HOURS PRN
Status: DISCONTINUED | OUTPATIENT
Start: 2025-04-28 | End: 2025-04-29 | Stop reason: HOSPADM

## 2025-04-28 RX ORDER — ROPIVACAINE HYDROCHLORIDE 5 MG/ML
INJECTION, SOLUTION EPIDURAL; INFILTRATION; PERINEURAL
Status: DISCONTINUED | OUTPATIENT
Start: 2025-04-28 | End: 2025-04-28 | Stop reason: SDUPTHER

## 2025-04-28 RX ORDER — SODIUM CHLORIDE, SODIUM LACTATE, POTASSIUM CHLORIDE, CALCIUM CHLORIDE 600; 310; 30; 20 MG/100ML; MG/100ML; MG/100ML; MG/100ML
INJECTION, SOLUTION INTRAVENOUS CONTINUOUS
Status: DISCONTINUED | OUTPATIENT
Start: 2025-04-28 | End: 2025-04-29

## 2025-04-28 RX ORDER — GLYCOPYRROLATE 0.2 MG/ML
INJECTION INTRAMUSCULAR; INTRAVENOUS
Status: DISCONTINUED | OUTPATIENT
Start: 2025-04-28 | End: 2025-04-28 | Stop reason: SDUPTHER

## 2025-04-28 RX ORDER — METHOCARBAMOL 500 MG/1
750 TABLET, FILM COATED ORAL 4 TIMES DAILY PRN
Status: DISCONTINUED | OUTPATIENT
Start: 2025-04-28 | End: 2025-04-29 | Stop reason: HOSPADM

## 2025-04-28 RX ORDER — TRANEXAMIC ACID 10 MG/ML
1000 INJECTION, SOLUTION INTRAVENOUS
Status: COMPLETED | OUTPATIENT
Start: 2025-04-28 | End: 2025-04-28

## 2025-04-28 RX ADMIN — SODIUM CHLORIDE, POTASSIUM CHLORIDE, SODIUM LACTATE AND CALCIUM CHLORIDE: 600; 310; 30; 20 INJECTION, SOLUTION INTRAVENOUS at 08:43

## 2025-04-28 RX ADMIN — MIDAZOLAM 2 MG: 1 INJECTION INTRAMUSCULAR; INTRAVENOUS at 07:21

## 2025-04-28 RX ADMIN — GLYCOPYRROLATE 0.2 MG: 0.2 INJECTION INTRAMUSCULAR; INTRAVENOUS at 07:44

## 2025-04-28 RX ADMIN — CEFAZOLIN 2000 MG: 2 INJECTION, POWDER, FOR SOLUTION INTRAMUSCULAR; INTRAVENOUS at 07:42

## 2025-04-28 RX ADMIN — Medication 100 MG: at 15:29

## 2025-04-28 RX ADMIN — TRANEXAMIC ACID 1000 MG: 10 INJECTION, SOLUTION INTRAVENOUS at 07:50

## 2025-04-28 RX ADMIN — EPHEDRINE SULFATE 20 MG: 50 INJECTION INTRAVENOUS at 07:42

## 2025-04-28 RX ADMIN — ONDANSETRON 4 MG: 2 INJECTION INTRAMUSCULAR; INTRAVENOUS at 10:20

## 2025-04-28 RX ADMIN — SODIUM CHLORIDE, PRESERVATIVE FREE 10 ML: 5 INJECTION INTRAVENOUS at 20:59

## 2025-04-28 RX ADMIN — MIDAZOLAM 2 MG: 1 INJECTION INTRAMUSCULAR; INTRAVENOUS at 07:33

## 2025-04-28 RX ADMIN — OXYCODONE AND ACETAMINOPHEN 2 TABLET: 325; 5 TABLET ORAL at 21:58

## 2025-04-28 RX ADMIN — METHOCARBAMOL 750 MG: 500 TABLET ORAL at 15:08

## 2025-04-28 RX ADMIN — ONDANSETRON 4 MG: 2 INJECTION INTRAMUSCULAR; INTRAVENOUS at 15:19

## 2025-04-28 RX ADMIN — HYDROMORPHONE HYDROCHLORIDE 0.5 MG: 1 INJECTION, SOLUTION INTRAMUSCULAR; INTRAVENOUS; SUBCUTANEOUS at 18:57

## 2025-04-28 RX ADMIN — VASOPRESSIN 1 UNITS: 20 INJECTION INTRAVENOUS at 09:19

## 2025-04-28 RX ADMIN — VASOPRESSIN 1 UNITS: 20 INJECTION INTRAVENOUS at 08:39

## 2025-04-28 RX ADMIN — METHOCARBAMOL 750 MG: 500 TABLET ORAL at 22:00

## 2025-04-28 RX ADMIN — OXYCODONE AND ACETAMINOPHEN 2 TABLET: 325; 5 TABLET ORAL at 13:19

## 2025-04-28 RX ADMIN — MIDAZOLAM 2 MG: 1 INJECTION INTRAMUSCULAR; INTRAVENOUS at 10:54

## 2025-04-28 RX ADMIN — SODIUM CHLORIDE, SODIUM LACTATE, POTASSIUM CHLORIDE, AND CALCIUM CHLORIDE: .6; .31; .03; .02 INJECTION, SOLUTION INTRAVENOUS at 13:37

## 2025-04-28 RX ADMIN — ATROPINE SULFATE 0.5 MG: 0.1 INJECTION, SOLUTION INTRAVENOUS at 08:41

## 2025-04-28 RX ADMIN — FOLIC ACID 500 MCG: 1 TABLET ORAL at 15:09

## 2025-04-28 RX ADMIN — FENTANYL CITRATE 20 MCG: 50 INJECTION, SOLUTION INTRAMUSCULAR; INTRAVENOUS at 07:35

## 2025-04-28 RX ADMIN — SODIUM CHLORIDE, POTASSIUM CHLORIDE, SODIUM LACTATE AND CALCIUM CHLORIDE: 600; 310; 30; 20 INJECTION, SOLUTION INTRAVENOUS at 07:29

## 2025-04-28 RX ADMIN — OXYCODONE AND ACETAMINOPHEN 2 TABLET: 325; 5 TABLET ORAL at 17:43

## 2025-04-28 RX ADMIN — HYDROMORPHONE HYDROCHLORIDE 0.5 MG: 1 INJECTION, SOLUTION INTRAMUSCULAR; INTRAVENOUS; SUBCUTANEOUS at 15:19

## 2025-04-28 RX ADMIN — HYDROMORPHONE HYDROCHLORIDE 0.5 MG: 1 INJECTION, SOLUTION INTRAMUSCULAR; INTRAVENOUS; SUBCUTANEOUS at 12:16

## 2025-04-28 RX ADMIN — SODIUM CHLORIDE, SODIUM LACTATE, POTASSIUM CHLORIDE, AND CALCIUM CHLORIDE: .6; .31; .03; .02 INJECTION, SOLUTION INTRAVENOUS at 06:48

## 2025-04-28 RX ADMIN — EPHEDRINE SULFATE 20 MG: 50 INJECTION INTRAVENOUS at 07:40

## 2025-04-28 RX ADMIN — PROPOFOL 70 MCG/KG/MIN: 10 INJECTION, EMULSION INTRAVENOUS at 07:43

## 2025-04-28 RX ADMIN — EPHEDRINE SULFATE 10 MG: 50 INJECTION INTRAVENOUS at 07:37

## 2025-04-28 RX ADMIN — WATER 2000 MG: 1 INJECTION INTRAMUSCULAR; INTRAVENOUS; SUBCUTANEOUS at 15:09

## 2025-04-28 RX ADMIN — ROPIVACAINE HYDROCHLORIDE 20 ML: 5 INJECTION, SOLUTION EPIDURAL; INFILTRATION; PERINEURAL at 07:25

## 2025-04-28 RX ADMIN — WATER 2000 MG: 1 INJECTION INTRAMUSCULAR; INTRAVENOUS; SUBCUTANEOUS at 23:19

## 2025-04-28 RX ADMIN — ALBUMIN (HUMAN) 12.5 G: 12.5 INJECTION, SOLUTION INTRAVENOUS at 08:25

## 2025-04-28 RX ADMIN — Medication 1 TABLET: at 15:20

## 2025-04-28 RX ADMIN — SODIUM CHLORIDE, PRESERVATIVE FREE 10 ML: 5 INJECTION INTRAVENOUS at 13:40

## 2025-04-28 RX ADMIN — HYDROMORPHONE HYDROCHLORIDE 0.5 MG: 1 INJECTION, SOLUTION INTRAMUSCULAR; INTRAVENOUS; SUBCUTANEOUS at 23:19

## 2025-04-28 RX ADMIN — BUPIVACAINE HYDROCHLORIDE IN DEXTROSE 1.8 ML: 7.5 INJECTION, SOLUTION SUBARACHNOID at 07:35

## 2025-04-28 RX ADMIN — ALBUMIN (HUMAN) 12.5 G: 12.5 INJECTION, SOLUTION INTRAVENOUS at 08:14

## 2025-04-28 ASSESSMENT — PAIN - FUNCTIONAL ASSESSMENT
PAIN_FUNCTIONAL_ASSESSMENT: PREVENTS OR INTERFERES SOME ACTIVE ACTIVITIES AND ADLS
PAIN_FUNCTIONAL_ASSESSMENT: PREVENTS OR INTERFERES SOME ACTIVE ACTIVITIES AND ADLS
PAIN_FUNCTIONAL_ASSESSMENT: INTOLERABLE, UNABLE TO DO ANY ACTIVE OR PASSIVE ACTIVITIES
PAIN_FUNCTIONAL_ASSESSMENT: 0-10
PAIN_FUNCTIONAL_ASSESSMENT: PREVENTS OR INTERFERES SOME ACTIVE ACTIVITIES AND ADLS

## 2025-04-28 ASSESSMENT — PAIN DESCRIPTION - FREQUENCY
FREQUENCY: CONTINUOUS
FREQUENCY: CONTINUOUS
FREQUENCY: OTHER (COMMENT)

## 2025-04-28 ASSESSMENT — PAIN SCALES - GENERAL
PAINLEVEL_OUTOF10: 7
PAINLEVEL_OUTOF10: 0
PAINLEVEL_OUTOF10: 7
PAINLEVEL_OUTOF10: 4
PAINLEVEL_OUTOF10: 7
PAINLEVEL_OUTOF10: 9
PAINLEVEL_OUTOF10: 7
PAINLEVEL_OUTOF10: 10
PAINLEVEL_OUTOF10: 10

## 2025-04-28 ASSESSMENT — PAIN DESCRIPTION - DESCRIPTORS
DESCRIPTORS: CRAMPING;ACHING;THROBBING
DESCRIPTORS: ACHING;THROBBING
DESCRIPTORS: OTHER (COMMENT)
DESCRIPTORS: ACHING;THROBBING;JABBING
DESCRIPTORS: ACHING
DESCRIPTORS: ACHING;THROBBING
DESCRIPTORS: ACHING;THROBBING
DESCRIPTORS: ACHING

## 2025-04-28 ASSESSMENT — PAIN DESCRIPTION - ORIENTATION
ORIENTATION: RIGHT
ORIENTATION: LEFT
ORIENTATION: LEFT
ORIENTATION: RIGHT

## 2025-04-28 ASSESSMENT — PAIN DESCRIPTION - PAIN TYPE
TYPE: SURGICAL PAIN

## 2025-04-28 ASSESSMENT — PAIN DESCRIPTION - LOCATION
LOCATION: HIP

## 2025-04-28 ASSESSMENT — PAIN DESCRIPTION - ONSET
ONSET: ON-GOING
ONSET: ON-GOING

## 2025-04-28 NOTE — PROGRESS NOTES
1000- pt received from OR. Monitors placed and alarms on. Report received from Marck REARDON. Pt alert and oriented x 4 upon arrival to PACU. Pt reports full sensation and is moving toes on command. Denies any pain or nausea.  1008- handoff given to Mary MUNGUIA.

## 2025-04-28 NOTE — CONSULTS
[facility] ACUTE CARE PHYSICAL THERAPY EVALUATION  Harsha Liu, 1970, 1123/1123-A, 4/28/2025    History  [place]:  There were no encounter diagnoses.  Patient  has a past medical history of Anxiety, GERD (gastroesophageal reflux disease), Hernia, inguinal, left, and Liver disease.  Patient  has a past surgical history that includes cyst removal (Left); hernia repair; Colonoscopy (N/A, 08/05/2024); and Upper gastrointestinal endoscopy (N/A, 08/05/2024).    Recommendation: home with initial 24/7 support available and outpatient PT     Subjective:  Patient states:  \"I feel a little flush\"   Pain:  7/10 right hip   Communication with other providers:  Handoff to RN  Restrictions: WBAT RLE, anterior hip precautions, falls, general precautions.     Home Setup/Prior level of function  Social/Functional History  Lives With: Spouse (planning to be home the first few days with pt)  Type of Home: House  Home Layout: One level  Home Access: Stairs to enter without rails  Entrance Stairs - Number of Steps: 2  Bathroom Shower/Tub: Tub/Shower unit  Bathroom Toilet: Handicap height  Bathroom Equipment: Shower chair  Home Equipment: Cane, Walker - Rolling  Prior Level of Assist for ADLs: Independent  Prior Level of Assist for Homemaking: Independent  Prior Level of Assist for Ambulation: Independent community ambulator, with or without device  Prior Level of Assist for Transfers: Independent  Active : Yes  Occupation: Full time employment    Examination of body systems (includes body structures/functions, activity/participation limitations):  Observation:  Supine in bed upon arrival. Cooperative with therapy.   Vision:  WFL  Hearing:  WFL  Cardiopulmonary:  stable vitals on room air   Orientation: WFL    Musculoskeletal  ROM R/L:  WFL BLES with the exception of right hip with anterior hip precautions.   Strength R/L:  RLE at least 3/5. Did not test against resistance due to pain. LLE grossly 5/5  Neuro: WFL

## 2025-04-28 NOTE — OP NOTE
Operative Note      Patient: Harsha Liu  YOB: 1970  MRN: [id number]    Date of Procedure: 4/28/2025    Pre-Op Diagnosis Codes:      * Primary osteoarthritis of right hip [M16.11]     * Right hip pain [M25.551]    Post-Op Diagnosis: Same       Procedure(s):  HIP TOTAL ARTHROPLASTY ANTERIOR APPROACH right hip    Surgeon(s):  Lance Marcelo MD    Assistant:   Physician Assistant: Virgilio Merrill PA-C Greg Mann was present for the entirety of the procedure and vital for the performance of the procedure. Brock Merrill assisted with positioning, prepping, draping of the patient before the procedure and instrument manipulation, extremity repositioning during the procedure as well as wound closure, dressing application and brace application after the procedure. Please note that no intern, resident, or other hospital staff was available to assist during the surgery    Anesthesia: Spinal, regional nerve block    Estimated Blood Loss (mL): less than 100     Complications: None    Specimens:   * No specimens in log *    Implants:  Implant Name Type Inv. Item Serial No.  Lot No. LRB No. Used Action   INSERT ACET F 0 DEG 36 MM HIP X3 TRIDENT - [id number]  INSERT ACET F 0 DEG 36 MM HIP X3 TRIDENT  [employer] [facility] [id number] Right 1 Implanted   SCREW BNE L25MM DIA6.5MM HEX LO PROF TRIDENT II - [id number]  SCREW BNE L25MM DIA6.5MM HEX LO [facility] TRIDENT II  [employer] [facility] [id number] Right 1 Implanted   SHELL ACET SZ F [id number] CLUS H TRITANIUM PRESSFIT EMERALD - [id number]  SHELL ACET SZ F [id number] CLUS H TRITANIUM PRESSFIT EMERALD  [employer] [facility] [id number] Right 1 Implanted   HEAD FEM [id number] +2.5MM OFFSET HIP BIOLOX DELT CERAMIC TAPR - [id number]  HEAD FEM [id number] +2.5MM OFFSET HIP BIOLOX DELT CERAMIC TAPR  [employer] [facility] [id number] Right 1 Implanted   STEM FEM SZ 6 L111MM NK L35MM 45MM OFFSET

## 2025-04-28 NOTE — ANESTHESIA PROCEDURE NOTES
Peripheral Block    Patient location during procedure: pre-op  Reason for block: post-op pain management  Start time: 4/28/2025 7:21 AM  End time: 4/28/2025 7:25 AM  Staffing  Performed: resident/CRNA   Resident/CRNA: Marck Denton APRN - CRNA  Performed by: Marck Denton APRN - CRNA  Authorized by: Marck Denton APRN - CRNA    Preanesthetic Checklist  Completed: patient identified, IV checked, site marked, risks and benefits discussed, surgical/procedural consents, equipment checked, pre-op evaluation, timeout performed, anesthesia consent given, oxygen available, monitors applied/VS acknowledged and fire risk safety assessment completed and verbalized  Peripheral Block   Patient position: supine  Prep: ChloraPrep  Provider prep: mask and sterile gloves  Patient monitoring: cardiac monitor, continuous pulse ox, continuous capnometry, frequent blood pressure checks, IV access, oxygen and responsive to questions  Block type: PENG  Laterality: right  Injection technique: single-shot  Guidance: ultrasound guided  Local infiltration: ropivacaine  Infiltration strength: 0.5 %  Local infiltration: ropivacaine  Dose: 20 mL    Needle   Needle type: insulated echogenic nerve stimulator needle   Needle gauge: 20 G  Needle localization: anatomical landmarks and ultrasound guidance  Test dose: negative  Assessment   Injection assessment: negative aspiration for heme, no paresthesia on injection, local visualized surrounding nerve on ultrasound and no intravascular symptoms  Paresthesia pain: none  Slow fractionated injection: yes  Hemodynamics: stable  Outcomes: uncomplicated and patient tolerated procedure well

## 2025-04-28 NOTE — PROGRESS NOTES
4 Eyes Skin Assessment     NAME:  Harsha Liu  YOB: 1970  MEDICAL RECORD NUMBER:  [id number]    The patient is being assessed for  Admission    I agree that at least one RN has performed a thorough Head to Toe Skin Assessment on the patient. ALL assessment sites listed below have been assessed.      Areas assessed by both nurses:    Head, Face, Ears, Shoulders, Back, Chest, Arms, Elbows, Hands, Sacrum. Buttock, Coccyx, Ischium, and Legs. Feet and Heels        Does the Patient have a Wound? No noted wound(s)       Adrian Prevention initiated by RN: Yes  Wound Care Orders initiated by RN: No    Pressure Injury (Stage 3,4, Unstageable, DTI, NWPT, and Complex wounds) if present, place Wound referral order by RN under : No    New Ostomies, if present place, Ostomy referral order under : No     Nurse 1 eSignature: Electronically signed by Nanette Rodriges RN on 4/28/25 at 11:22 AM EDT    **SHARE this note so that the co-signing nurse can place an eSignature**    Nurse 2 eSignature: Electronically signed by Michelle Hickman LPN on 4/28/25 at 12:50 PM EDT
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
## 2025-04-28 NOTE — PROGRESS NOTES
Pharmacist Review and Automatic Dose Adjustment of Prophylactic Enoxaparin    Reviewed reason(s) for admission/hospital problem list    The reviewing pharmacist has made an adjustment to the ordered enoxaparin dose or converted to UFH per the approved [facility] protocol and table as identified below.        Harsha Liu is a 54 y.o. male.     No results for input(s): \"CREATININE\" in the last 72 hours.    Estimated Creatinine Clearance: 126 mL/min (based on SCr of 0.9 mg/dL).    No results for input(s): \"HGB\", \"HCT\", \"PLT\" in the last 72 hours.  No results for input(s): \"INR\" in the last 72 hours.    Height:   Ht Readings from Last 1 Encounters:   04/15/25 1.829 m (6')     Weight:  Wt Readings from Last 1 Encounters:   04/15/25 115.7 kg (255 lb)               Plan: Based upon the patient's weight and renal function    Ordered: Enoxaparin 40mg SUBQ Daily    Changed/converted to    New Order: Enoxaparin 30mg SUBQ BID      Thank you,  Alexa Earl [facility]  4/28/2025, 11:23 AM

## 2025-04-28 NOTE — ANESTHESIA PROCEDURE NOTES
Spinal Block    End time: 4/28/2025 7:35 AM  Reason for block: post-op pain management, primary anesthetic and at surgeon's request  Staffing  Performed: resident/CRNA   Resident/CRNA: Marck Denton APRN - CRNA  Performed by: Marck Denton APRN - CRNA  Authorized by: Marck Denton APRN - CRNA    Spinal Block  Patient position: sitting  Prep: Betadine  Patient monitoring: cardiac monitor, continuous pulse ox, continuous capnometry, oxygen and frequent blood pressure checks  Approach: midline  Location: L3/L4  Provider prep: mask and sterile gloves  Needle  Needle type: Pencan   Needle gauge: 25 G  Needle length: 4 in  Assessment  Sensory level: T10  Swirl obtained: Yes  CSF: clear  Attempts: 1  Hemodynamics: stable  Preanesthetic Checklist  Completed: patient identified, IV checked, site marked, risks and benefits discussed, surgical/procedural consents, equipment checked, pre-op evaluation, timeout performed, anesthesia consent given, oxygen available, monitors applied/VS acknowledged, fire risk safety assessment completed and verbalized and blood product R/B/A discussed and consented

## 2025-04-28 NOTE — ANESTHESIA POSTPROCEDURE EVALUATION
Department of Anesthesiology  Postprocedure Note    Patient: Harsha Liu  MRN: [id number]  YOB: 1970  Date of evaluation: 4/28/2025    Procedure Summary       Date: 04/28/25 Room / Location: [address]    Anesthesia Start: 0729 Anesthesia Stop: 1006    Procedure: HIP TOTAL ARTHROPLASTY ANTERIOR APPROACH (Right: Hip) Diagnosis:       Primary osteoarthritis of right hip      Right hip pain      (Primary osteoarthritis of right hip [M16.11])      (Right hip pain [M25.551])    Surgeons: Lance Marcelo MD Responsible Provider: Олег Hughes MD    Anesthesia Type: Spinal ASA Status: 3            Anesthesia Type: Spinal    Maria R Phase I: Maria R Score: 10    Maria R Phase II:      Anesthesia Post Evaluation    Patient location during evaluation: PACU  Patient participation: waiting for patient participation  Level of consciousness: awake  Pain score: 0  Airway patency: patent  Nausea & Vomiting: no nausea and no vomiting  Respiratory status: acceptable, spontaneous ventilation and room air  Hydration status: euvolemic  Multimodal analgesia pain management approach  Pain management: adequate    There were no known notable events for this encounter.

## 2025-04-29 VITALS
TEMPERATURE: 97.9 F | HEIGHT: 60 IN | SYSTOLIC BLOOD PRESSURE: 123 MMHG | BODY MASS INDEX: 52.37 KG/M2 | DIASTOLIC BLOOD PRESSURE: 73 MMHG | RESPIRATION RATE: 18 BRPM | OXYGEN SATURATION: 97 % | HEART RATE: 93 BPM | WEIGHT: 266.76 LBS

## 2025-04-29 PROBLEM — Z96.641 STATUS POST TOTAL HIP REPLACEMENT, RIGHT: Status: ACTIVE | Noted: 2025-04-29

## 2025-04-29 LAB
ALBUMIN SERPL-MCNC: 3.4 G/DL (ref 3.4–5)
ALBUMIN/GLOB SERPL: 1.5 {RATIO} (ref 1.1–2.2)
ALP SERPL-CCNC: 89 U/L (ref 40–129)
ALT SERPL-CCNC: 17 U/L (ref 10–40)
ANION GAP SERPL CALCULATED.3IONS-SCNC: 9 MMOL/L (ref 9–17)
AST SERPL-CCNC: 24 U/L (ref 15–37)
BASOPHILS # BLD: 0.05 K/UL
BASOPHILS NFR BLD: 1 % (ref 0–1)
BILIRUB SERPL-MCNC: 0.5 MG/DL (ref 0–1)
BUN SERPL-MCNC: 9 MG/DL (ref 7–20)
CALCIUM SERPL-MCNC: 8.4 MG/DL (ref 8.3–10.6)
CHLORIDE SERPL-SCNC: 103 MMOL/L (ref 99–110)
CO2 SERPL-SCNC: 24 MMOL/L (ref 21–32)
CREAT SERPL-MCNC: 0.8 MG/DL (ref 0.9–1.3)
EOSINOPHIL # BLD: 0.08 K/UL
EOSINOPHILS RELATIVE PERCENT: 1 % (ref 0–3)
ERYTHROCYTE [DISTWIDTH] IN BLOOD BY AUTOMATED COUNT: 13.5 % (ref 11.7–14.9)
GFR, ESTIMATED: >90 ML/MIN/1.73M2
GLUCOSE SERPL-MCNC: 150 MG/DL (ref 74–99)
HCT VFR BLD AUTO: 38.1 % (ref 42–52)
HGB BLD-MCNC: 12.9 G/DL (ref 13.5–18)
IMM GRANULOCYTES # BLD AUTO: 0.06 K/UL
IMM GRANULOCYTES NFR BLD: 1 %
LYMPHOCYTES NFR BLD: 1.46 K/UL
LYMPHOCYTES RELATIVE PERCENT: 18 % (ref 24–44)
MCH RBC QN AUTO: 29.6 PG (ref 27–31)
MCHC RBC AUTO-ENTMCNC: 33.9 G/DL (ref 32–36)
MCV RBC AUTO: 87.4 FL (ref 78–100)
MONOCYTES NFR BLD: 1.06 K/UL
MONOCYTES NFR BLD: 13 % (ref 0–5)
NEUTROPHILS NFR BLD: 67 % (ref 36–66)
NEUTS SEG NFR BLD: 5.48 K/UL
PLATELET, FLUORESCENCE: 138 K/UL (ref 140–440)
PMV BLD AUTO: 10.9 FL (ref 7.5–11.1)
POTASSIUM SERPL-SCNC: 4.1 MMOL/L (ref 3.5–5.1)
PROT SERPL-MCNC: 5.6 G/DL (ref 6.4–8.2)
RBC # BLD AUTO: 4.36 M/UL (ref 4.6–6.2)
SODIUM SERPL-SCNC: 135 MMOL/L (ref 136–145)
WBC OTHER # BLD: 8.2 K/UL (ref 4–10.5)

## 2025-04-29 PROCEDURE — 97530 THERAPEUTIC ACTIVITIES: CPT

## 2025-04-29 PROCEDURE — 85025 COMPLETE CBC W/AUTO DIFF WBC: CPT

## 2025-04-29 PROCEDURE — 36415 COLL VENOUS BLD VENIPUNCTURE: CPT

## 2025-04-29 PROCEDURE — 97110 THERAPEUTIC EXERCISES: CPT

## 2025-04-29 PROCEDURE — G0378 HOSPITAL OBSERVATION PER HR: HCPCS

## 2025-04-29 PROCEDURE — 6370000000 HC RX 637 (ALT 250 FOR IP): Performed by: STUDENT IN AN ORGANIZED HEALTH CARE EDUCATION/TRAINING PROGRAM

## 2025-04-29 PROCEDURE — 6370000000 HC RX 637 (ALT 250 FOR IP): Performed by: ORTHOPAEDIC SURGERY

## 2025-04-29 PROCEDURE — 80053 COMPREHEN METABOLIC PANEL: CPT

## 2025-04-29 PROCEDURE — 97116 GAIT TRAINING THERAPY: CPT

## 2025-04-29 PROCEDURE — 96361 HYDRATE IV INFUSION ADD-ON: CPT

## 2025-04-29 PROCEDURE — 96374 THER/PROPH/DIAG INJ IV PUSH: CPT

## 2025-04-29 PROCEDURE — 6360000002 HC RX W HCPCS: Performed by: ORTHOPAEDIC SURGERY

## 2025-04-29 PROCEDURE — 2580000003 HC RX 258: Performed by: ORTHOPAEDIC SURGERY

## 2025-04-29 RX ORDER — METHOCARBAMOL 750 MG/1
750 TABLET, FILM COATED ORAL 3 TIMES DAILY PRN
Qty: 20 TABLET | Refills: 0 | Status: SHIPPED | OUTPATIENT
Start: 2025-04-29 | End: 2025-05-09

## 2025-04-29 RX ORDER — ASPIRIN 325 MG
325 TABLET ORAL 2 TIMES DAILY
Qty: 60 TABLET | Refills: 0 | Status: SHIPPED | OUTPATIENT
Start: 2025-04-29

## 2025-04-29 RX ORDER — OXYCODONE AND ACETAMINOPHEN 5; 325 MG/1; MG/1
2 TABLET ORAL EVERY 6 HOURS PRN
Qty: 40 TABLET | Refills: 0 | Status: SHIPPED | OUTPATIENT
Start: 2025-04-29 | End: 2025-05-06

## 2025-04-29 RX ORDER — PANTOPRAZOLE SODIUM 40 MG/1
40 TABLET, DELAYED RELEASE ORAL
Status: DISCONTINUED | OUTPATIENT
Start: 2025-04-29 | End: 2025-04-29 | Stop reason: HOSPADM

## 2025-04-29 RX ORDER — DOCUSATE SODIUM 100 MG/1
100 CAPSULE, LIQUID FILLED ORAL DAILY PRN
Qty: 30 CAPSULE | Refills: 0 | Status: SHIPPED | OUTPATIENT
Start: 2025-04-29

## 2025-04-29 RX ADMIN — METHOCARBAMOL 750 MG: 500 TABLET ORAL at 03:04

## 2025-04-29 RX ADMIN — PANTOPRAZOLE SODIUM 40 MG: 40 TABLET, DELAYED RELEASE ORAL at 06:16

## 2025-04-29 RX ADMIN — HYDROMORPHONE HYDROCHLORIDE 0.5 MG: 1 INJECTION, SOLUTION INTRAMUSCULAR; INTRAVENOUS; SUBCUTANEOUS at 05:08

## 2025-04-29 RX ADMIN — Medication 100 MG: at 09:47

## 2025-04-29 RX ADMIN — OXYCODONE AND ACETAMINOPHEN 2 TABLET: 325; 5 TABLET ORAL at 03:04

## 2025-04-29 RX ADMIN — SODIUM CHLORIDE, SODIUM LACTATE, POTASSIUM CHLORIDE, AND CALCIUM CHLORIDE: .6; .31; .03; .02 INJECTION, SOLUTION INTRAVENOUS at 03:15

## 2025-04-29 RX ADMIN — Medication 1 TABLET: at 09:46

## 2025-04-29 RX ADMIN — OXYCODONE AND ACETAMINOPHEN 2 TABLET: 325; 5 TABLET ORAL at 09:45

## 2025-04-29 RX ADMIN — FOLIC ACID 500 MCG: 1 TABLET ORAL at 09:45

## 2025-04-29 RX ADMIN — METHOCARBAMOL 750 MG: 500 TABLET ORAL at 09:44

## 2025-04-29 ASSESSMENT — PAIN DESCRIPTION - PAIN TYPE
TYPE: SURGICAL PAIN

## 2025-04-29 ASSESSMENT — PAIN DESCRIPTION - LOCATION
LOCATION: HIP

## 2025-04-29 ASSESSMENT — PAIN DESCRIPTION - ONSET
ONSET: ON-GOING

## 2025-04-29 ASSESSMENT — PAIN - FUNCTIONAL ASSESSMENT
PAIN_FUNCTIONAL_ASSESSMENT: PREVENTS OR INTERFERES SOME ACTIVE ACTIVITIES AND ADLS

## 2025-04-29 ASSESSMENT — PAIN DESCRIPTION - ORIENTATION
ORIENTATION: RIGHT

## 2025-04-29 ASSESSMENT — PAIN DESCRIPTION - FREQUENCY
FREQUENCY: CONTINUOUS

## 2025-04-29 ASSESSMENT — PAIN DESCRIPTION - DESCRIPTORS
DESCRIPTORS: ACHING
DESCRIPTORS: THROBBING

## 2025-04-29 ASSESSMENT — PAIN SCALES - GENERAL
PAINLEVEL_OUTOF10: 10
PAINLEVEL_OUTOF10: 7
PAINLEVEL_OUTOF10: 8
PAINLEVEL_OUTOF10: 4

## 2025-04-29 NOTE — DISCHARGE SUMMARY
DISCHARGE SUMMARY:  Lance Marcelo MD    Date of Admission:      4/28/2025  Date of Discharge:    4/29/2025     Admission Diagnosis   Primary osteoarthritis of right hip [M16.11]  Right hip pain [M25.551]   Discharge Diagnosis   Same    Procedure:    Right Total hip replacement 4/28/2025    Consultations:  IP CONSULT TO HOSPITALIST  IP CONSULT TO CASE MANAGEMENT  IP CONSULT TO HOME CARE NEEDS    Brief history and hospital stay.    Harsha Liu is a 54 y.o. male who underwent total hip replacement procedure without complication.  Harsha Liu was admitted to the floor following surgery.    Appropriate consults were obtained as outlined in progress notes. The patient’s diet was advanced as tolerated.  The patient remained hemodynamically stable and neurovascularly intact in the lower extremity throughout the hospital course.    On the day of discharge the patient's calf remained soft and showed no evidence of DVT.      Physical therapy and occupational therapy were consulted.  The patient progressed well with PT/OT throughout the stay.      DVT prophylaxis was initiated and will continue for 4 weeks.    The patients pain was controlled initially with IV pain medications and then was transitioned to oral pain medications prior to discharge    The patient was discharged to Home and will continue to follow the precautions outlined to them by us and PT/OT.     Condition on Discharge: Stable    Medications       Medication List        START taking these medications      aspirin 325 MG tablet  Commonly known as: Michael Aspirin  Take 1 tablet by mouth in the morning and 1 tablet in the evening.     docusate sodium 100 MG capsule  Commonly known as: COLACE  Take 1 capsule by mouth daily as needed for Constipation     oxyCODONE-acetaminophen 5-325 MG per tablet  Commonly known as: Percocet  Take 2 tablets by mouth every 6 hours as needed for Pain for up to 7 days. Intended supply: 7  our number to call should they have any questions or concerns following discharge.    Lance Marcelo MD

## 2025-04-29 NOTE — CARE COORDINATION
Chart reviewed and met w/ patient to initiate discharge planning. CM introduced self and explained role. Patient reports he is from home w/ his wife, has PCP and insurance. Patient reports he has needed DME and is planning for OP PT. Patient declined any needs from CM at this time. CM remains available should needs present.       04/29/25 2152   Service Assessment   Patient Orientation Alert and Oriented   Cognition Alert   History Provided By Patient   Primary Caregiver Self   Support Systems Spouse/Significant Other   Patient's Healthcare Decision Maker is: Legal Next of Kin   PCP Verified by CM Yes   Prior Functional Level Independent in ADLs/IADLs   Can patient return to prior living arrangement Yes   Ability to make needs known: Good   Family able to assist with home care needs: Yes   Community Resources None   Social/Functional History   Lives With Spouse   Type of Home House   Home Equipment Cane;Walker - Rolling   Prior Level of Assist for ADLs Independent   Prior Level of Assist for Transfers Independent   Discharge Planning   Type of Residence House   Living Arrangements Spouse/Significant Other   Current Services Prior To Admission None   Potential Assistance Needed Outpatient PT/OT   DME Ordered? No   Potential Assistance Purchasing Medications No   Type of Home Care Services None   Patient expects to be discharged to: House   History of falls? 0   Services At/After Discharge   Transition of Care Consult (CM Consult) N/A   Services At/After Discharge Outpatient;PT   Condition of Participation: Discharge Planning   The Patient and/or Patient Representative was provided with a Choice of Provider? Patient   The Patient and/Or Patient Representative agree with the Discharge Plan? Yes

## 2025-04-29 NOTE — CONSULTS
V2.0  Consult Note      Name:  Harsha Liu /Age/Sex: 1970  (54 y.o. male)   MRN & CSN:  [id number] & [id number] Encounter Date/Time: 2025 5:15 AM EDT   Location:  [address] [place] [address] PCP: John Blue MD       Hospital Day: 2    Assessment and Plan:   Harsha Liu is a 54 y.o. male who presents with Primary osteoarthritis of right hip    Hospital Problems           Last Modified POA    * (Principal) Primary osteoarthritis of right hip 2025 Unknown    Right hip pain 3/13/2025 Unknown       Plan:    Primary osteoarthritis of right hip s/p total hip arthroplasty on   Post-op care including DVT prophylaxis per primary  PT/OT, pain control  CBC tomorrow    Hx of alcohol abuse  Liver cirrhosis  Pt abstinent from alcohol for for the past 10 months. Not on diuretics or lactulose at home. MELD-Na is 7 based on most recent labs in 3/2025. Recent abdominal ultrasound with findings suggestive of chronic liver disease and no focal hepatic abnormality.  Continue nadolol, thiamine, folic acid and multivitamins  Limit excessive IVF in the setting of cirrhosis, would stop soon if tolerating PO intake and re-evaluate further need for continued fluids daily  CMP tomorrow  Continue home antabuse at discharge, pt reports he does not need this while in the hospital  Continue follow up with GI outpatient      Disposition:   Current Living situation:   Expected Disposition:   Estimated D/C:     Diet ADULT DIET; Regular; Low Sodium (2 gm)   DVT Prophylaxis [x] Lovenox, []  Heparin, [] SCDs, [] Ambulation,  [] Eliquis, [] Xarelto, [] Coumadin   Code Status Full Code   Surrogate Decision Maker/ POA      Personally reviewed Lab Studies and Imaging     Imaging that was interpreted personally includes as above    Drugs that require monitoring for toxicity include lovenox and the method of monitoring was signs of over bleeding / CBC        History from:     patient, electronic medical record    History of Present  Out of Food in the Last Year: Never true     Ran Out of Food in the Last Year: Never true   Transportation Needs: No Transportation Needs (4/28/2025)    PRAPARE - Transportation     Lack of Transportation (Medical): No     Lack of Transportation (Non-Medical): No   Physical Activity: Inactive (1/28/2025)    Exercise Vital Sign     Days of Exercise per Week: 0 days     Minutes of Exercise per Session: 0 min   Housing Stability: Low Risk  (4/28/2025)    Housing Stability Vital Sign     Unable to Pay for Housing in the Last Year: No     Number of Times Moved in the Last Year: 0     Homeless in the Last Year: No       Medications:   Medications:    folic acid  500 mcg Oral Daily    therapeutic multivitamin-minerals  1 tablet Oral Daily    nadolol  20 mg Oral Daily    sodium chloride flush  5-40 mL IntraVENous 2 times per day    enoxaparin  30 mg SubCUTAneous BID    thiamine  100 mg Oral Daily      Infusions:    lactated ringers 75 mL/hr at 04/29/25 0315    sodium chloride       PRN Meds: sodium chloride flush, 5-40 mL, PRN  sodium chloride, , PRN  HYDROmorphone, 0.25 mg, Q3H PRN   Or  HYDROmorphone, 0.5 mg, Q3H PRN  ondansetron, 4 mg, Q8H PRN   Or  ondansetron, 4 mg, Q6H PRN  oxyCODONE-acetaminophen, 1 tablet, Q4H PRN  oxyCODONE-acetaminophen, 2 tablet, Q4H PRN  methocarbamol, 750 mg, 4x Daily PRN        Labs      CBC:   Recent Labs     04/29/25  0111   WBC 8.2   HGB 12.9*     BMP:  No results for input(s): \"NA\", \"K\", \"CL\", \"CO2\", \"BUN\", \"CREATININE\", \"GLUCOSE\" in the last 72 hours.  Hepatic: No results for input(s): \"AST\", \"ALT\", \"BILITOT\", \"ALKPHOS\" in the last 72 hours.    Invalid input(s): \"ALB\"  Lipids:   Lab Results   Component Value Date/Time    CHOL 199 08/23/2024 08:40 AM    HDL 58 08/23/2024 08:40 AM    TRIG 111 08/23/2024 08:40 AM     Hemoglobin A1C:   Lab Results   Component Value Date/Time    LABA1C 6.2 12/13/2024 07:40 AM     TSH:   Lab Results   Component Value Date/Time    TSH 0.89 12/13/2024 07:40 AM

## 2025-04-29 NOTE — DISCHARGE INSTRUCTIONS
Discharge instructions  Total hip replacement    -You may replace the gauze dressing and outside tape as needed.  There is a mesh tape with skin glue over top of the incision at the skin level.  This tape is watertight and you may shower.  Mild bloody drainage for the first few days is normal and you can cover any areas that may have small drainage with piece of gauze and tape.  Okay to gently clean the hip and leg with soap and water.  Do not soak in the tub. You may leave the incision open to air once there is no longer any drainage.    -You may remove the clear adhesive tape on the skin 7 to 10 days after surgery.    -Physical therapy is very important.  This may be done as an outpatient or as a home physical therapy.      -Weight-bear as tolerated with a walker and transition to a cane once approved by your physical therapist.  Continue to protect the hip with anterior hip precautions and avoid extending the leg behind your body.    -Taper off of the narcotic pain medication as pain allows.  You may substitute a dose of Tylenol or ibuprofen in place of the narcotic pain medication as the pain improves.    -Continue your daily blood thinner to help prevent the formation of a blood clot in the leg.  Take your aspirin twice a day for 1 month.     -Call the office if there is any concern for wound healing problems, worsening pain or redness, or other medical issues.

## 2025-04-29 NOTE — PROGRESS NOTES
Outpatient Pharmacy Progress Note for Meds-to-Beds    Total number of Prescriptions Filled: 4    Additional Documentation:  Patient's family member picked-up the medication(s) in the OP Pharmacy      Thank you for letting us serve your patients.  [address] [address]    Phone: [phone]    Fax: [phone]

## 2025-04-29 NOTE — PROGRESS NOTES
Doing well postoperatively.    Pain controlled  Did well with physical therapy yesterday    Objective:   Patient Vitals for the past 4 hrs:   BP Temp Temp src Resp   04/29/25 0615 -- 98.3 °F (36.8 °C) Oral --   04/29/25 0538 -- -- -- 18   04/29/25 0508 -- -- -- 17   04/29/25 0500 119/68 -- -- --   04/29/25 0438 -- 99.3 °F (37.4 °C) Oral --         Physical Exam:     The patient is awake and alert  Resting comfortably in bed    Operative extremity:    The dressing is clean dry and intact.  No erythema, drainage, or induration.  Calf is soft and nontender.  Sensation and motor function intact distally      LABS   CBC:   Recent Labs     04/29/25  0111   WBC 8.2   HGB 12.9*       Postoperative x-rays show well aligned prosthesis with no complications    Assessment and Plan     1.  POD # 1 total hip replacement    1:  Physical therapy consult for mobilization   -Weight-bear as tolerated.  Anterior hip precautions.  2:  DVT prophylaxis   -Aspirin twice a day for 1 month  3:  Continue Pain Control   -Continue oral pain medications.  IV medications for breakthrough pain only.  4.  Medical management per hospitalist service  5:  D/C Planning:     -Plan discharge to home later today if mobilizing well with therapy and pain is well controlled.  Follow-up in 2 weeks for x-rays and wound check.         Lance Marcelo MD

## 2025-04-29 NOTE — PROGRESS NOTES
Physical Therapy Treatment Note  Name: Harsha Liu MRN: [id number] :   1970   Date:  2025   Admission Date: 2025 Room:  [address] [place] [address]   Restrictions/Precautions: WBAT RLE, anterior hip precautions, falls, general precautions.   Communication with other providers:  Pt okay to see for therapy per RN   Subjective:  Patient states:  \"I can definitely tell I didn't have pain meds\"   Pain:   Location, Type, Intensity (0/10 to 10/10):  7/10 while ambulating.   Objective:    Observation:  Pt supine in bed upon PTA arrival.   Objective Measures:  Tele, stable  Treatment, including education/measures:    Therapeutic Activity Training:   Therapeutic activity training was instructed today.  Cues were given for safety, sequence, UE/LE placement, awareness, and balance. Activities performed today included bed mobility training, sup-sit, sit-stand, SPT.    Mobility:  Sup > sit: SBA with use of leg .  Scooting: SBA  STS: CGA from EOB, CGA from recliner.   SPT: From EOB > recliner CGA and RW.     Gait:  Gait training conducted for ~150ft with RW and SBA. Pt demos initial step-through gait pattern progressing to continuous gait pattern, decreased step length, decreased gait speed, decreased foot clearance.    Pt ascended/descended two 6in steps, three 4in steps x 2 reps with B UE support on handrails and CGA. Pt demos good LE advancement, no instability noted.     Exercises:   PTA instructed pt in APs, quad set, glut sets, heel slides, SAQ 1 x 3 ea. For understanding only, handout provided for carryover.     Education:  PTA instructed pt in use of hip kit items for increased independence. Pt verbalizes understanding of ea. Item.     Safety:   Gait belt donned this session. Pt returned safely to recliner with chair alarm activated, call light in reach, all needs met.   Assessment / Impression:    Pt tolerated OOB activities well this date. Pt does demo decreased endurance and strength and will continue to

## 2025-04-30 ENCOUNTER — RESULTS FOLLOW-UP (OUTPATIENT)
Dept: CARDIOLOGY CLINIC | Age: 55
End: 2025-04-30

## 2025-05-01 ENCOUNTER — TELEPHONE (OUTPATIENT)
Dept: FAMILY MEDICINE CLINIC | Age: 55
End: 2025-05-01

## 2025-05-01 ENCOUNTER — HOSPITAL ENCOUNTER (OUTPATIENT)
Dept: PHYSICAL THERAPY | Age: 55
Setting detail: THERAPIES SERIES
Discharge: HOME | End: 2025-05-01
Payer: COMMERCIAL

## 2025-05-01 PROCEDURE — 97164 PT RE-EVAL EST PLAN CARE: CPT

## 2025-05-01 PROCEDURE — 97110 THERAPEUTIC EXERCISES: CPT

## 2025-05-01 NOTE — TELEPHONE ENCOUNTER
Care Transitions Initial Follow Up Call    Outreach made within 2 business days of discharge: Yes    Patient: Harsha Liu Patient : 1970   MRN: [id number]  Reason for Admission: primary osteoarthritis of right hip  Discharge Date: 25       Spoke with: Patient    Discharge department/facility:[facility]    TCM Interactive Patient Contact:  Was patient able to fill all prescriptions: Yes  Was patient instructed to bring all medications to the follow-up visit: Yes  Is patient taking all medications as directed in the discharge summary? Yes  Does patient understand their discharge instructions: Yes  Does patient have questions or concerns that need addressed prior to 7-14 day follow up office visit: no    Additional needs identified to be addressed with provider  No needs identified             Scheduled appointment with PCP within 7-14 days. Patient declined appointment in the office.     Follow Up  Future Appointments   Date Time Provider Department Center   2025  4:00 PM Virgilio Love, PT [facility] PT [facility]   2025  8:00 AM Virgilio Merrill PA-C [facility]   2025  8:00 AM Lance Marcelo MD [facility]   2025  8:00 AM Virgilio Merrill PA-C [facility]   2025  4:30 PM Whitney Mclean MD [facility]   9/10/2025  3:00 PM Dena Dean, APRN - CNP [facility]       Mecca Landa MA

## 2025-05-01 NOTE — FLOWSHEET NOTE
with his wife and has a walker and cane but also has 3 entry steps with no rails at this time he is considering adding them prior to surgery.  Pt would benefit from skilled therapy interventions as needed to address listed impairments, progress toward goal completion and improve ADL/IADL status.  PT also warranted to reduce risk for further injury or decline.           Subjective:  Pt states he is have 6-9/10 pain and notes he is waling with a walker and doing fairly well with his ADL abut wooten use the walker and leg  noting raising his R LE is very difficult and painful. She reports sleeping relatively well.  He does appear to be aware of is restrictions.        Any changes in Ambulatory Summary Sheet?  None        Objective:  ( 5/1/25  R hip PROM flex 97  abd 22 ,   quad set : fair , glute set : fair , sit to stand : with UE support and FWW , gait : FWW, step through pattern ,  TTP : ant hip and thigh to the quads  )           Exercises: (No more than 4 columns)   R THR ant 4/28/25  Exercise/Equipment 1# 4/23/25 2# 5/1/25 Date           WARM UP         NuStep            TABLE      Glute set *10  *10    Quad set *10 *10    AP *10 *10    LAQ *10 *10                                     STANDING      Weight shift AP and lat *10  *10    March at walker  *10 steps                                       PROPRIOCEPTION                                    MODALITIES                      Other Therapeutic Activities/Education:  reviewed HEP and expectations. Reviewed gait with walker , ice and elevation.       Home Exercise Program:     Access Code: [email or web address]  URL: [email or web address]  Date: 05/01/2025  Prepared by: Virgilio Love    Exercises  - Supine Gluteal Sets  - 2 x daily - 7 x weekly - 1 sets - 10 reps  - Supine Ankle Pumps  - 2 x daily - 7 x weekly - 1 sets - 10 reps  - Supine Quad Set  - 2 x daily - 7 x weekly - 1 sets - 10 reps  - Seated Long Arc Quad  - 2 x daily - 7 x weekly - 1 sets - 10 reps  -

## 2025-05-01 NOTE — PROGRESS NOTES
Outpatient Physical Therapy           [place]           [] Phone: [phone]   Fax: [phone]  [place]           [] Phone: [phone]   Fax: [phone]      To: Lance Marcelo MD     From: Virgilio Love, PT, PT     Patient: Harsha Liu                    : 1970  Diagnosis:  Primary osteoarthritis of right hip [M16.11]  Chronic right hip pain [M25.551, G89.29]        Treatment Diagnosis:     R anterior THR 25 weakness, limited gait , stairs, ROM , pain , swelling   Date: 2025  [x]  Progress Note / Re-Eval               []  Discharge Note    Evaluation Date:  25   Total Visits to date:   2 Cancels/No-shows to date: 0     Subjective:     Pt states he is have 6-9/10 pain and notes he is waling with a walker and doing fairly well with his ADL abut wooten use the walker and leg  noting raising his R LE is very difficult and painful. She reports sleeping relatively well.  He does appear to be aware of is restrictions.     Plan of Care/Treatment to date:  [x] Therapeutic Exercise    [x] Modalities: prn  [x] Therapeutic Activity     [] Ultrasound  [] Electrical Stimulation  [x] Gait Training      [] Cervical Traction   [] Lumbar Traction  [] Neuromuscular Re-education  [] Cold/hotpack [] Iontophoresis  [x] Instruction in HEP      Other:  [x] Manual Therapy       [x]  Vasopneumatic  [] Aquatic Therapy       []   Dry Needle Therapy                      Objective/Significant Findings At Last Visit/Comments:  R hip PROM flex 97  abd 22 ,   quad set : fair , glute set : fair , sit to stand : with UE support and FWW , gait : FWW, step through pattern ,  TTP : ant hip and thigh to the quads       Assessment:   Pt appears to be functionally progress nicely for post op day 3. He is walking well with a step through pattern using FWW and tolerating WB well. He does appear to be limited in his active hip flexor contraction tolerance making marching and leg rises very difficult.he does

## 2025-05-02 ENCOUNTER — TELEPHONE (OUTPATIENT)
Dept: ORTHOPEDIC SURGERY | Age: 55
End: 2025-05-02

## 2025-05-02 NOTE — TELEPHONE ENCOUNTER
Please advise patient is wanting a medication refill.    Patient states that he will be running out Sunday morning.

## 2025-05-05 ENCOUNTER — TELEPHONE (OUTPATIENT)
Dept: ORTHOPEDIC SURGERY | Age: 55
End: 2025-05-05

## 2025-05-05 ENCOUNTER — HOSPITAL ENCOUNTER (OUTPATIENT)
Dept: PHYSICAL THERAPY | Age: 55
Discharge: HOME | End: 2025-05-05

## 2025-05-05 NOTE — FLOWSHEET NOTE
Physical Therapy  Cancellation/No-show Note  Patient Name:  Harsha Liu  :  1970   Date:  2025  Cancelled visits to date: 1  No-shows to date: 0    For today's appointment patient:  [x]  Cancelled  []  Rescheduled appointment  []  No-show     Reason given by patient:  []  Patient ill  []  Conflicting appointment  []  No transportation    []  Conflict with work  [x]  No reason given  [x]  Other:     Comments:   moved appointment to tomorrow    Electronically signed by:  Virgilio Love PT,

## 2025-05-05 NOTE — TELEPHONE ENCOUNTER
Patient needs pain medication refill, per previous note patient will wean gradually moving forward.     Please send pain med refill to pharmacy of record.     Right BEATA(A), DOS 4/28/2025

## 2025-05-06 ENCOUNTER — HOSPITAL ENCOUNTER (OUTPATIENT)
Dept: PHYSICAL THERAPY | Age: 55
Setting detail: THERAPIES SERIES
Discharge: HOME | End: 2025-05-06
Payer: COMMERCIAL

## 2025-05-06 DIAGNOSIS — Z96.641 STATUS POST TOTAL HIP REPLACEMENT, RIGHT: Primary | ICD-10-CM

## 2025-05-06 PROCEDURE — 97110 THERAPEUTIC EXERCISES: CPT

## 2025-05-06 PROCEDURE — 97116 GAIT TRAINING THERAPY: CPT

## 2025-05-06 PROCEDURE — 97530 THERAPEUTIC ACTIVITIES: CPT

## 2025-05-06 PROCEDURE — 97112 NEUROMUSCULAR REEDUCATION: CPT

## 2025-05-06 RX ORDER — OXYCODONE AND ACETAMINOPHEN 5; 325 MG/1; MG/1
1 TABLET ORAL EVERY 4 HOURS PRN
Qty: 35 TABLET | Refills: 0 | Status: SHIPPED | OUTPATIENT
Start: 2025-05-06 | End: 2025-05-13

## 2025-05-06 NOTE — FLOWSHEET NOTE
Outpatient Physical Therapy  [place]           [x] Phone: [phone]   Fax: [phone]  [place]           [] Phone: [phone]   Fax: [phone]        Physical Therapy Daily Treatment Note  Date:  2025    Patient Name:  Harsha Liu   \" MARZENA \" :  1970  MRN: [id number]  Restrictions/Precautions: No data recorded      Diagnosis:   Primary osteoarthritis of right hip [M16.11]  Chronic right hip pain [M25.551, G89.29]    Date of Injury/Surgery:  25 ant hip replacement R   Treatment Diagnosis:  R anterior THR 25 weakness, limited gait , stairs, ROM , pain , swelling  Insurance/Certification information: [facility]  20 Y  Referring Physician:  Lance Marcelo MD     PCP: John Blue MD  Next Doctor Visit:   surgery 25  Plan of care signed (Y/N):  Y  Outcome Measure:  HOOS: 10 pts  Visit# / total visits:  3 /  15-   Pain level:      5 /10   R hip   Goals:     Patient goals: get back to work, fishing and hunting post THR R  Short term goals  Time Frame for Short term goals: 4 weeks  1. ind with HEP for R THR anterior approach 25  2. walking with step through pattern using a cane or less AD  3. able to complete a SLR on the R ind  4. sleeping comfortably in bed  5. aware fo Hip precautions of no hyper ext of the R hip  Long Term Goals  Time Frame for Long Term Goals: 6-8 weeks  1. returned to work as a , go hunting and fishing comfortably 2/10 or less pain  2. walk with no AD community dist  without a limp  3. staris 1 flight reciprocally with one rail  4. sit to stand with good form and no UE support required  5. MMT R hip abd ,and flex 4+/5        Summary of Evaluation:  Assessment: Pt is a  that stands at work for 8-9 hours per day, he also likes to hunt and fish from a boat in [facility]. He has OA and pain in the R hip and is having a R THR with an anterior approach 25 before returning to therapy.  He does live in a single floor ranch

## 2025-05-08 ENCOUNTER — APPOINTMENT (OUTPATIENT)
Dept: PHYSICAL THERAPY | Age: 55
End: 2025-05-08
Payer: COMMERCIAL

## 2025-05-12 ENCOUNTER — HOSPITAL ENCOUNTER (OUTPATIENT)
Dept: PHYSICAL THERAPY | Age: 55
Setting detail: THERAPIES SERIES
Discharge: HOME | End: 2025-05-12
Payer: COMMERCIAL

## 2025-05-12 NOTE — FLOWSHEET NOTE
Physical Therapy  Cancellation/No-show Note  Patient Name:  Harsha Liu  :  1970   Date:  2025  Cancelled visits to date: 2  No-shows to date: 0    For today's appointment patient:  [x]  Cancelled  []  Rescheduled appointment  []  No-show     Reason given by patient:  []  Patient ill  []  Conflicting appointment  []  No transportation    []  Conflict with work  [x]  No reason given  [x]  Other:     Comments:   moved appointment to tomorrow did not like the late time today    Electronically signed by:  Virgilio Love, PT,

## 2025-05-13 ENCOUNTER — HOSPITAL ENCOUNTER (OUTPATIENT)
Dept: PHYSICAL THERAPY | Age: 55
Setting detail: THERAPIES SERIES
Discharge: HOME | End: 2025-05-13
Payer: COMMERCIAL

## 2025-05-13 PROCEDURE — 97112 NEUROMUSCULAR REEDUCATION: CPT

## 2025-05-13 PROCEDURE — 97110 THERAPEUTIC EXERCISES: CPT

## 2025-05-13 NOTE — FLOWSHEET NOTE
Outpatient Physical Therapy  [place]           [x] Phone: [phone]   Fax: [phone]  [place]           [] Phone: [phone]   Fax: [phone]        Physical Therapy Daily Treatment Note  Date:  2025    Patient Name:  Harsha Liu   \" MARZENA \" :  1970  MRN: [id number]  Restrictions/Precautions: No data recorded      Diagnosis:   Primary osteoarthritis of right hip [M16.11]  Chronic right hip pain [M25.551, G89.29]    Date of Injury/Surgery:  25 ant hip replacement R   Treatment Diagnosis:  R anterior THR 25 weakness, limited gait , stairs, ROM , pain , swelling  Insurance/Certification information: [facility]  20 Y  Referring Physician:  Lance Marcelo MD     PCP: John Blue MD  Next Doctor Visit:   surgery 25  Plan of care signed (Y/N):  Y  Outcome Measure:  HOOS: 10 pts  Visit# / total visits:  4 /  15-   Pain level:       /10   R hip   Goals:     Patient goals: get back to work, fishing and hunting post THR R  Short term goals  Time Frame for Short term goals: 4 weeks  1. ind with HEP for R THR anterior approach 25  2. walking with step through pattern using a cane or less AD  3. able to complete a SLR on the R ind  4. sleeping comfortably in bed  5. aware fo Hip precautions of no hyper ext of the R hip  Long Term Goals  Time Frame for Long Term Goals: 6-8 weeks  1. returned to work as a , go hunting and fishing comfortably 2/10 or less pain  2. walk with no AD community dist  without a limp  3. staris 1 flight reciprocally with one rail  4. sit to stand with good form and no UE support required  5. MMT R hip abd ,and flex 4+/5        Summary of Evaluation:  Assessment: Pt is a  that stands at work for 8-9 hours per day, he also likes to hunt and fish from a boat in [facility]. He has OA and pain in the R hip and is having a R THR with an anterior approach 25 before returning to therapy.  He does live in a single floor ranch

## 2025-05-15 ENCOUNTER — HOSPITAL ENCOUNTER (OUTPATIENT)
Dept: PHYSICAL THERAPY | Age: 55
Setting detail: THERAPIES SERIES
Discharge: HOME | End: 2025-05-15
Payer: COMMERCIAL

## 2025-05-15 PROCEDURE — 97140 MANUAL THERAPY 1/> REGIONS: CPT

## 2025-05-15 PROCEDURE — 97530 THERAPEUTIC ACTIVITIES: CPT

## 2025-05-15 PROCEDURE — 97110 THERAPEUTIC EXERCISES: CPT

## 2025-05-15 NOTE — FLOWSHEET NOTE
Outpatient Physical Therapy  [place]           [x] Phone: [phone]   Fax: [phone]  [place]           [] Phone: [phone]   Fax: [phone]        Physical Therapy Daily Treatment Note  Date:  5/15/2025    Patient Name:  Harsha Liu   \" MARZENA \" :  1970  MRN: [id number]  Restrictions/Precautions: No data recorded      Diagnosis:   Primary osteoarthritis of right hip [M16.11]  Chronic right hip pain [M25.551, G89.29]    Date of Injury/Surgery:  25 ant hip replacement R   Treatment Diagnosis:  R anterior THR 25 weakness, limited gait , stairs, ROM , pain , swelling  Insurance/Certification information: [facility]  20 Y  Referring Physician:  Lance Marcelo MD     PCP: John Blue MD  Next Doctor Visit:   25  Plan of care signed (Y/N):  Y  Outcome Measure:  HOOS: 10 pts  Visit# / total visits:  5 /  15-   Pain level:     0-4  /10   R hip   Goals:     Patient goals: get back to work, fishing and hunting post THR R  Short term goals  Time Frame for Short term goals: 4 weeks  1. ind with HEP for R THR anterior approach 25  2. walking with step through pattern using a cane or less AD  3. able to complete a SLR on the R ind  4. sleeping comfortably in bed  5. aware fo Hip precautions of no hyper ext of the R hip  Long Term Goals  Time Frame for Long Term Goals: 6-8 weeks  1. returned to work as a , go hunting and fishing comfortably 2/10 or less pain  2. walk with no AD community dist  without a limp  3. staris 1 flight reciprocally with one rail  4. sit to stand with good form and no UE support required  5. MMT R hip abd ,and flex 4+/5        Summary of Evaluation:  Assessment: Pt is a  that stands at work for 8-9 hours per day, he also likes to hunt and fish from a boat in [place] [facility]. He has OA and pain in the R hip and is having a R THR with an anterior approach 25 before returning to therapy.  He does live in a single floor ranch with

## 2025-05-19 ENCOUNTER — HOSPITAL ENCOUNTER (OUTPATIENT)
Dept: PHYSICAL THERAPY | Age: 55
Setting detail: THERAPIES SERIES
Discharge: HOME | End: 2025-05-19
Payer: COMMERCIAL

## 2025-05-19 NOTE — FLOWSHEET NOTE
Physical Therapy  Cancellation/No-show Note  Patient Name:  Harsha Liu  :  1970   Date:  2025  Cancelled visits to date: 3  No-shows to date: 0    For today's appointment patient:  [x]  Cancelled  []  Rescheduled appointment  []  No-show     Reason given by patient:  []  Patient ill  []  Conflicting appointment  []  No transportation    []  Conflict with work  [x]  No reason given  [x]  Other:     Comments:   Overdid it this weekend, not up for coming in     Electronically signed by:  Virgilio Love, PT,

## 2025-05-20 DIAGNOSIS — Z09 POSTOPERATIVE EXAMINATION: Primary | ICD-10-CM

## 2025-05-21 ENCOUNTER — HOSPITAL ENCOUNTER (OUTPATIENT)
Dept: GENERAL RADIOLOGY | Age: 55
Discharge: HOME | End: 2025-05-21
Payer: COMMERCIAL

## 2025-05-21 DIAGNOSIS — Z09 POSTOPERATIVE EXAMINATION: ICD-10-CM

## 2025-05-21 PROCEDURE — 73501 X-RAY EXAM HIP UNI 1 VIEW: CPT

## 2025-05-22 ENCOUNTER — HOSPITAL ENCOUNTER (OUTPATIENT)
Dept: PHYSICAL THERAPY | Age: 55
Setting detail: THERAPIES SERIES
Discharge: HOME | End: 2025-05-22
Payer: COMMERCIAL

## 2025-05-22 ENCOUNTER — OFFICE VISIT (OUTPATIENT)
Dept: ORTHOPEDIC SURGERY | Age: 55
End: 2025-05-22

## 2025-05-22 VITALS
RESPIRATION RATE: 16 BRPM | OXYGEN SATURATION: 96 % | BODY MASS INDEX: 36.14 KG/M2 | HEART RATE: 67 BPM | HEIGHT: 72 IN | WEIGHT: 266.8 LBS

## 2025-05-22 DIAGNOSIS — Z96.641 S/P TOTAL RIGHT HIP ARTHROPLASTY: Primary | ICD-10-CM

## 2025-05-22 PROCEDURE — 99024 POSTOP FOLLOW-UP VISIT: CPT

## 2025-05-22 PROCEDURE — 97530 THERAPEUTIC ACTIVITIES: CPT

## 2025-05-22 PROCEDURE — 97110 THERAPEUTIC EXERCISES: CPT

## 2025-05-22 RX ORDER — HYDROCODONE BITARTRATE AND ACETAMINOPHEN 5; 325 MG/1; MG/1
1 TABLET ORAL EVERY 4 HOURS PRN
Qty: 30 TABLET | Refills: 0 | Status: SHIPPED | OUTPATIENT
Start: 2025-05-22 | End: 2025-05-27

## 2025-05-22 NOTE — PROGRESS NOTES
Patient seen in office today for Rt BEATA    DOS: 04/28/2025       Patient reports 5 /10 pain.  RICE and medication are effective to alleviate pain and reduce swelling.   Pain also at times alleviated by: OTC meds.   Pain worsened by: Patient reports painful ROM & weight bearing.     Incision is well approximated, clean, dry and intact.     Xrays performed at [facility] on 05/21/2025    Patient did ask for refill on pain medication and to the incision to be checked as the top enteral suture is poking out and catching on clothing.

## 2025-05-22 NOTE — PATIENT INSTRUCTIONS
Continue weight-bearing as tolerated.  Continue range of motion exercises as instructed.  Ice and elevate as needed.  Tylenol or Motrin for pain.  Follow up as scheduled.     We are committed to providing you the best care possible.  If you receive a survey after visiting one of our offices, please take time to share your experience concerning your physician office visit.  These surveys are confidential and no health information about you is shared.  We are eager to improve for you and we are counting on your feedback to help make that happen.

## 2025-05-22 NOTE — FLOWSHEET NOTE
muscle strain. Pt would continue to benefit from skilled therapy interventions to address remaining impairments, improve mobility and strength and progress toward goal completion while reducing risk for re-injury or further decline.  End pain: 2/10    Plan for Next Session:  R THR anterior approach:   ROM , strength , gait , stairs, manual, vaso prn    Time In / Time Out:  6273 - 3324      Timed Code/Total Treatment Minutes:  40': 10' TA x1, 30' TE x2      Next Progress Note due:  6/1/25       Plan of Care Interventions:  [x] Therapeutic Exercise  [x] Modalities: prn  [x] Therapeutic Activity     [] Ultrasound  [] Estim  [x] Gait Training      [] Cervical Traction [] Lumbar Traction  [x] Neuromuscular Re-education    [] Cold/hotpack [] Iontophoresis   [x] Instruction in HEP      [x] Vasopneumatic   [] Dry Needling    [x] Manual Therapy               [] Aquatic Therapy              Electronically signed by:  Helga Dexter PT,  DPT   5/22/2025,8:09 AM

## 2025-05-22 NOTE — PROGRESS NOTES
5/22/2025   Chief Complaint   Patient presents with    Hip Pain     Right BEATA 04/28/2025        History of Present Illness:                             Harsha Liu is a 54 y.o. male patient is a 54-year-old male returning to the office today for continued postoperative care regarding his right total hip arthroplasty, DOS 4/28/2025.  Patient states he is doing well with his healing process.  He notes no obvious complications.  He is attending physical therapy sessions.  He denies any distal paresthesias or calf pain.    Patient seen in office today for Rt BEATA     DOS: 04/28/2025         Patient reports 5 /10 pain.  RICE and medication are effective to alleviate pain and reduce swelling.   Pain also at times alleviated by: OTC meds.   Pain worsened by: Patient reports painful ROM & weight bearing.      Incision is well approximated, clean, dry and intact.      Xrays performed at [facility] on 05/21/2025     Patient did ask for refill on pain medication and to the incision to be checked as the top enteral suture is poking out and catching on clothing.         Medical History  Patient's medications, allergies, past medical, surgical, social and family histories were reviewed and updated as appropriate.      Review of Systems                                            Examination:  General Exam:  Vitals: Pulse 67   Resp 16   Ht 1.829 m (6')   Wt 121 kg (266 lb 12.8 oz)   SpO2 96%   BMI 36.18 kg/m²    Physical Exam     Right hip exam: Patient right hip appears with a well-approximated anterior incision with no signs of infection such as erythema, fluctuance, drainage, or surrounding temperature changes.  Patient able to perform gentle range of motion maneuvers of the hip joint in all fields with minimal discomfort.  He can tolerate internal and external rotation of the hip joint.  Patient maintains full range of motion of the knee.  Popliteal pulse 2+, Homans' sign negative.  Sensation light touch intact

## 2025-05-27 ENCOUNTER — HOSPITAL ENCOUNTER (OUTPATIENT)
Dept: PHYSICAL THERAPY | Age: 55
Setting detail: THERAPIES SERIES
Discharge: HOME | End: 2025-05-27
Payer: COMMERCIAL

## 2025-05-27 PROCEDURE — 97112 NEUROMUSCULAR REEDUCATION: CPT

## 2025-05-27 PROCEDURE — 97110 THERAPEUTIC EXERCISES: CPT

## 2025-05-27 NOTE — FLOWSHEET NOTE
Outpatient Physical Therapy  [place]           [x] Phone: [phone]   Fax: [phone]  [place]           [] Phone: [phone]   Fax: [phone]        Physical Therapy Daily Treatment Note  Date:  2025    Patient Name:  Harsha Liu   \" MARZENA \" :  1970  MRN: [id number]  Restrictions/Precautions: No data recorded    Diagnosis:   Primary osteoarthritis of right hip [M16.11]  Chronic right hip pain [M25.551, G89.29]    Date of Injury/Surgery:  25 ant hip replacement R   Treatment Diagnosis:  R anterior THR 25 weakness, limited gait , stairs, ROM , pain , swelling  Insurance/Certification information: [facility]  20 Y  Referring Physician:  Lance Marcelo MD     PCP: John Blue MD  Next Doctor Visit:   25  Plan of care signed (Y/N):  Y  Outcome Measure:  HOOS: 10 pts  Visit# / total visits:  7 /  15-   Pain level:     3/10   R hip with driving   Goals:     Patient goals: get back to work, fishing and hunting post THR R  Short term goals  Time Frame for Short term goals: 4 weeks  1. ind with HEP for R THR anterior approach 25  2. walking with step through pattern using a cane or less AD  3. able to complete a SLR on the R ind  4. sleeping comfortably in bed  5. aware fo Hip precautions of no hyper ext of the R hip  Long Term Goals  Time Frame for Long Term Goals: 6-8 weeks  1. returned to work as a , go hunting and fishing comfortably 2/10 or less pain  2. walk with no AD community dist  without a limp  3. staris 1 flight reciprocally with one rail  4. sit to stand with good form and no UE support required  5. MMT R hip abd ,and flex 4+/5        Summary of Evaluation:  Assessment: Pt is a  that stands at work for 8-9 hours per day, he also likes to hunt and fish from a boat in [facility]. He has OA and pain in the R hip and is having a R THR with an anterior approach 25 before returning to therapy.  He does live in a single floor ranch

## 2025-05-29 ENCOUNTER — HOSPITAL ENCOUNTER (OUTPATIENT)
Dept: PHYSICAL THERAPY | Age: 55
Setting detail: THERAPIES SERIES
Discharge: HOME | End: 2025-05-29
Payer: COMMERCIAL

## 2025-05-29 PROCEDURE — 97140 MANUAL THERAPY 1/> REGIONS: CPT

## 2025-05-29 PROCEDURE — 97110 THERAPEUTIC EXERCISES: CPT

## 2025-05-29 NOTE — PROGRESS NOTES
Outpatient Physical Therapy           [place]           [x] Phone: [phone]   Fax: [phone]  [place]           [] Phone: [phone]   Fax: [phone]      To: Lance Marcelo MD     From: Virgilio Love, PT, DPT     Patient: Harsha Liu                    : 1970  Diagnosis:  Primary osteoarthritis of right hip [M16.11]  Chronic right hip pain [M25.551, G89.29]        Treatment Diagnosis:     R anterior THR 25 weakness, limited gait , stairs, ROM , pain , swelling   Date: 2025  [x]  Progress Note                []  Discharge Note    Evaluation Date:   25  Total Visits to date:   8 Cancels/No-shows to date:  3    Subjective:  Pt states that he has started to walk more without the cane,noting that he does get occasional pain in the hip up to 5/10 but not typically. He reports feeling about 70% better, but still with some occasional pain and difficulty with prolonged sitting and then getting up to walk again, as well as general strength. He is not limited in his sleeping.       Plan of Care/Treatment to date:  [x] Therapeutic Exercise    [] Modalities:  [x] Therapeutic Activity     [] Ultrasound  [] Electrical Stimulation  [x] Gait Training      [] Cervical Traction   [] Lumbar Traction  [] Neuromuscular Re-education  [] Cold/hotpack [] Iontophoresis  [x] Instruction in HEP      Other:  [x] Manual Therapy       []  Vasopneumatic  [] Aquatic Therapy       []   Dry Needle Therapy                      Objective/Significant Findings At Last Visit/Comments:   ind with HEP,  Gait : slight cane use and reports of occasional pain , mild increased lateral  movements ,  SLR R : Ind  ,  sleeping comfortably in bed ,  voiced awareness of hip precautions for anterior THR, sit to stand : Ind, no UE support required,  stairs : reciprocally with mil limp on R  , MMT R hip flex 4-/5 abd 4-/5       Assessment:   Pt tolerated treatment fairly well today. He did appear with some groin area

## 2025-05-29 NOTE — FLOWSHEET NOTE
- 1 sets - 10 reps  - Supine Quad Set  - 2 x daily - 7 x weekly - 1 sets - 10 reps  - Seated Long Arc Quad  - 2 x daily - 7 x weekly - 1 sets - 10 reps  - Standing Weight Shift Side to Side  - 2 x daily - 7 x weekly - 1 sets - 10 reps  - Stride Stance Weight Shift  - 2 x daily - 7 x weekly - 1 sets - 10 reps  - Standing Marching  - 2 x daily - 7 x weekly - 1 sets - 10 reps  - Supine Straight Leg Raises  - 1 x daily - 7 x weekly - 1-2 sets - 10 reps  - Supine March  - 1 x daily - 7 x weekly - 1-2 sets - 10 reps  - Standing Tandem Balance with Counter Support  - 1 x daily - 7 x weekly - 1-2 sets - 1 reps - 30 hold  - Narrow Stance with Counter Support  - 1 x daily - 7 x weekly - 1-2 sets - 1 reps - 30 hold    Manual Treatments:   PROM R hip all dir w/in precautions, STM MFR to scar area    Modalities:  X    Communication with other providers:  Cert sent to Lance Marcelo MD , 4/23/2025. Re-eval 5/1/25, PN 5/29/25    Assessment:   Pt tolerated treatment fairly well today. He did appear with some groin area tightness and discomfort with some walking and stair. Pt rated pain at 1/10 after treatment. Pt will continue to benefit from more ROM, balance, and strengthening.       Plan for Next Session:  R THR anterior approach:   ROM , strength , gait , stairs, manual, vaso prn    Time In / Time Out:  1650-  1730      Timed Code/Total Treatment Minutes:   2 TE (30') 1 man (10')       Next Progress Note due:  6/29/25       Plan of Care Interventions:  [x] Therapeutic Exercise  [x] Modalities: prn  [x] Therapeutic Activity     [] Ultrasound  [] Estim  [x] Gait Training      [] Cervical Traction [] Lumbar Traction  [x] Neuromuscular Re-education    [] Cold/hotpack [] Iontophoresis   [x] Instruction in HEP      [x] Vasopneumatic   [] Dry Needling    [x] Manual Therapy               [] Aquatic Therapy              Electronically signed by:  Virgilio Love, PT,  5/29/2025,7:36 AM

## 2025-06-02 ENCOUNTER — PATIENT MESSAGE (OUTPATIENT)
Dept: FAMILY MEDICINE CLINIC | Age: 55
End: 2025-06-02

## 2025-06-02 RX ORDER — PHENTERMINE HYDROCHLORIDE 37.5 MG/1
37.5 TABLET ORAL
Qty: 30 TABLET | Refills: 0 | Status: SHIPPED | OUTPATIENT
Start: 2025-06-02 | End: 2025-07-02

## 2025-06-05 ENCOUNTER — HOSPITAL ENCOUNTER (OUTPATIENT)
Dept: PHYSICAL THERAPY | Age: 55
Setting detail: THERAPIES SERIES
Discharge: HOME | End: 2025-06-05
Payer: COMMERCIAL

## 2025-06-05 PROCEDURE — 97110 THERAPEUTIC EXERCISES: CPT

## 2025-06-05 PROCEDURE — 97140 MANUAL THERAPY 1/> REGIONS: CPT

## 2025-06-05 NOTE — FLOWSHEET NOTE
Outpatient Physical Therapy  [place]           [x] Phone: [phone]   Fax: [phone]  [place]           [] Phone: [phone]   Fax: [phone]        Physical Therapy Daily Treatment Note  Date:  2025    Patient Name:  Harsha Liu   \" MARZENA \" :  1970  MRN: [id number]  Restrictions/Precautions: No data recorded    Diagnosis:   Primary osteoarthritis of right hip [M16.11]  Chronic right hip pain [M25.551, G89.29]    Date of Injury/Surgery:  25 ant hip replacement R   Treatment Diagnosis:  R anterior THR 25 weakness, limited gait , stairs, ROM , pain , swelling  Insurance/Certification information: [facility]  20 Y  Referring Physician:  Lance Marcelo MD     PCP: John Blue MD  Next Doctor Visit:   25  Plan of care signed (Y/N):  Y  Outcome Measure:  HOOS: 10 pts  Visit# / total visits:  9/  15-   Pain level:    10   R hip    Goals:     Patient goals: get back to work, fishing and hunting post THR R  Short term goals  Time Frame for Short term goals: 4 weeks  1. ind with HEP for R THR anterior approach 25  met  2. walking with step through pattern using a cane or less AD  met  3. able to complete a SLR on the R ind  met  4. sleeping comfortably in bed   met  5. aware fo Hip precautions of no hyper ext of the R hip  not met  Long Term Goals  Time Frame for Long Term Goals: 6-8 weeks  1. returned to work as a , go hunting and fishing comfortably 2/10 or less pain  not met  2. walk with no AD community dist  without a limp  not met  3. staris 1 flight reciprocally with one rail  met  4. sit to stand with good form and no UE support required  met  5. MMT R hip abd ,and flex 4+/5  not met        Summary of Evaluation:  Assessment: Pt is a  that stands at work for 8-9 hours per day, he also likes to hunt and fish from a boat in [facility]. He has OA and pain in the R hip and is having a R THR with an anterior approach 25 before

## 2025-06-12 ENCOUNTER — HOSPITAL ENCOUNTER (OUTPATIENT)
Dept: PHYSICAL THERAPY | Age: 55
Setting detail: THERAPIES SERIES
Discharge: HOME | End: 2025-06-12
Payer: COMMERCIAL

## 2025-06-12 PROCEDURE — 97140 MANUAL THERAPY 1/> REGIONS: CPT

## 2025-06-12 PROCEDURE — 97110 THERAPEUTIC EXERCISES: CPT

## 2025-06-12 NOTE — FLOWSHEET NOTE
Seated Long Arc Quad  - 2 x daily - 7 x weekly - 1 sets - 10 reps  - Standing Weight Shift Side to Side  - 2 x daily - 7 x weekly - 1 sets - 10 reps  - Stride Stance Weight Shift  - 2 x daily - 7 x weekly - 1 sets - 10 reps  - Standing Marching  - 2 x daily - 7 x weekly - 1 sets - 10 reps  - Supine Straight Leg Raises  - 1 x daily - 7 x weekly - 1-2 sets - 10 reps  - Supine March  - 1 x daily - 7 x weekly - 1-2 sets - 10 reps  - Standing Tandem Balance with Counter Support  - 1 x daily - 7 x weekly - 1-2 sets - 1 reps - 30 hold  - Narrow Stance with Counter Support  - 1 x daily - 7 x weekly - 1-2 sets - 1 reps - 30 hold    Manual Treatments:   , hip circumduction, PROM, stretching R hip all dir w/in precautions,   Modalities:  X    Communication with other providers:  Cert sent to Lance Marcelo MD , 4/23/2025. Re-eval 5/1/25, PN 5/29/25    Assessment:    Pt tolerated treatment  well today. He continues to progress well functionally and is gaining in ROM and strength. He does appear to be walking well without the cane and appears he will tolerate boat fishing well this weekend. Pt rated pain at 1/10 after treatment. Pt will continue to benefit from more ROM, balance, and strengthening.       Plan for Next Session:  R THR anterior approach:   ROM , strength , gait , stairs, manual, vaso prn        Time In / Time Out:  1557-  1636      Timed Code/Total Treatment Minutes:    2 TE (29') 1 man (10')       Next Progress Note due:  6/29/25       Plan of Care Interventions:  [x] Therapeutic Exercise  [x] Modalities: prn  [x] Therapeutic Activity     [] Ultrasound  [] Estim  [x] Gait Training      [] Cervical Traction [] Lumbar Traction  [x] Neuromuscular Re-education    [] Cold/hotpack [] Iontophoresis   [x] Instruction in HEP      [x] Vasopneumatic   [] Dry Needling    [x] Manual Therapy               [] Aquatic Therapy              Electronically signed by:  Virgilio Love, PT,  6/12/2025,6:55 AM

## 2025-06-16 ASSESSMENT — PROMIS GLOBAL HEALTH SCALE
IN THE PAST 7 DAYS, HOW WOULD YOU RATE YOUR FATIGUE ON AVERAGE [ON A SCALE FROM 1 (NONE) TO 5 (VERY SEVERE)]?: MODERATE
IN THE PAST 7 DAYS, HOW WOULD YOU RATE YOUR FATIGUE ON AVERAGE [ON A SCALE FROM 1 (NONE) TO 5 (VERY SEVERE)]?: MODERATE
TO WHAT EXTENT ARE YOU ABLE TO CARRY OUT YOUR EVERYDAY PHYSICAL ACTIVITIES SUCH AS WALKING, CLIMBING STAIRS, CARRYING GROCERIES, OR MOVING A CHAIR [ON A SCALE OF 1 (NOT AT ALL) TO 5 (COMPLETELY)]?: MOSTLY
IN GENERAL, HOW WOULD YOU RATE YOUR SATISFACTION WITH YOUR SOCIAL ACTIVITIES AND RELATIONSHIPS [ON A SCALE OF 1 (POOR) TO 5 (EXCELLENT)]?: EXCELLENT
IN THE PAST 7 DAYS, HOW WOULD YOU RATE YOUR PAIN ON AVERAGE [ON A SCALE FROM 0 (NO PAIN) TO 10 (WORST IMAGINABLE PAIN)]?: 4
IN GENERAL, HOW WOULD YOU RATE YOUR PHYSICAL HEALTH [ON A SCALE OF 1 (POOR) TO 5 (EXCELLENT)]?: GOOD
IN GENERAL, WOULD YOU SAY YOUR HEALTH IS...[ON A SCALE OF 1 (POOR) TO 5 (EXCELLENT)]: GOOD
WHO IS THE PERSON COMPLETING THE PROMIS V1.1 SURVEY?: SELF
HOW IS THE PROMIS V1.1 BEING ADMINISTERED?: ELECTRONIC
IN GENERAL, PLEASE RATE HOW WELL YOU CARRY OUT YOUR USUAL SOCIAL ACTIVITIES (INCLUDES ACTIVITIES AT HOME, AT WORK, AND IN YOUR COMMUNITY, AND RESPONSIBILITIES AS A PARENT, CHILD, SPOUSE, EMPLOYEE, FRIEND, ETC) [ON A SCALE OF 1 (POOR) TO 5 (EXCELLENT)]?: EXCELLENT
SUM OF RESPONSES TO QUESTIONS 2, 4, 5, & 10: 18
IN GENERAL, HOW WOULD YOU RATE YOUR MENTAL HEALTH, INCLUDING YOUR MOOD AND YOUR ABILITY TO THINK [ON A SCALE OF 1 (POOR) TO 5 (EXCELLENT)]?: EXCELLENT
SUM OF RESPONSES TO QUESTIONS 3, 6, 7, & 8: 14
WHO IS THE PERSON COMPLETING THE PROMIS V1.1 SURVEY?: SELF
IN GENERAL, WOULD YOU SAY YOUR QUALITY OF LIFE IS...[ON A SCALE OF 1 (POOR) TO 5 (EXCELLENT)]: GOOD
IN THE PAST 7 DAYS, HOW WOULD YOU RATE YOUR PAIN ON AVERAGE [ON A SCALE FROM 0 (NO PAIN) TO 10 (WORST IMAGINABLE PAIN)]?: 4
IN GENERAL, PLEASE RATE HOW WELL YOU CARRY OUT YOUR USUAL SOCIAL ACTIVITIES (INCLUDES ACTIVITIES AT HOME, AT WORK, AND IN YOUR COMMUNITY, AND RESPONSIBILITIES AS A PARENT, CHILD, SPOUSE, EMPLOYEE, FRIEND, ETC) [ON A SCALE OF 1 (POOR) TO 5 (EXCELLENT)]?: EXCELLENT
TO WHAT EXTENT ARE YOU ABLE TO CARRY OUT YOUR EVERYDAY PHYSICAL ACTIVITIES SUCH AS WALKING, CLIMBING STAIRS, CARRYING GROCERIES, OR MOVING A CHAIR [ON A SCALE OF 1 (NOT AT ALL) TO 5 (COMPLETELY)]?: MOSTLY
IN THE PAST 7 DAYS, HOW OFTEN HAVE YOU BEEN BOTHERED BY EMOTIONAL PROBLEMS, SUCH AS FEELING ANXIOUS, DEPRESSED, OR IRRITABLE [ON A SCALE FROM 1 (NEVER) TO 5 (ALWAYS)]?: NEVER
IN THE PAST 7 DAYS, HOW OFTEN HAVE YOU BEEN BOTHERED BY EMOTIONAL PROBLEMS, SUCH AS FEELING ANXIOUS, DEPRESSED, OR IRRITABLE [ON A SCALE FROM 1 (NEVER) TO 5 (ALWAYS)]?: NEVER
IN GENERAL, HOW WOULD YOU RATE YOUR SATISFACTION WITH YOUR SOCIAL ACTIVITIES AND RELATIONSHIPS [ON A SCALE OF 1 (POOR) TO 5 (EXCELLENT)]?: EXCELLENT
HOW IS THE PROMIS V1.1 BEING ADMINISTERED?: ELECTRONIC

## 2025-06-17 ENCOUNTER — OFFICE VISIT (OUTPATIENT)
Dept: ORTHOPEDIC SURGERY | Age: 55
End: 2025-06-17

## 2025-06-17 VITALS — OXYGEN SATURATION: 99 % | HEIGHT: 72 IN | HEART RATE: 73 BPM | BODY MASS INDEX: 36.18 KG/M2

## 2025-06-17 DIAGNOSIS — Z96.641 S/P TOTAL RIGHT HIP ARTHROPLASTY: Primary | ICD-10-CM

## 2025-06-17 PROCEDURE — 99024 POSTOP FOLLOW-UP VISIT: CPT | Performed by: ORTHOPAEDIC SURGERY

## 2025-06-17 RX ORDER — ATORVASTATIN CALCIUM 40 MG/1
40 TABLET, FILM COATED ORAL NIGHTLY
COMMUNITY

## 2025-06-17 RX ORDER — HYDROCODONE BITARTRATE AND ACETAMINOPHEN 5; 325 MG/1; MG/1
TABLET ORAL
COMMUNITY

## 2025-06-17 RX ORDER — OXYCODONE AND ACETAMINOPHEN 5; 325 MG/1; MG/1
TABLET ORAL
COMMUNITY

## 2025-06-17 RX ORDER — TRAMADOL HYDROCHLORIDE 50 MG/1
TABLET ORAL
COMMUNITY

## 2025-06-17 RX ORDER — HYDROCODONE BITARTRATE AND ACETAMINOPHEN 5; 325 MG/1; MG/1
1 TABLET ORAL 2 TIMES DAILY PRN
Qty: 14 TABLET | Refills: 0 | Status: SHIPPED | OUTPATIENT
Start: 2025-06-17 | End: 2025-06-24

## 2025-06-17 RX ORDER — ACAMPROSATE CALCIUM 333 MG/1
TABLET, DELAYED RELEASE ORAL
COMMUNITY

## 2025-06-17 RX ORDER — DOXYCYCLINE 100 MG/1
CAPSULE ORAL
COMMUNITY
Start: 2025-06-03

## 2025-06-17 NOTE — PROGRESS NOTES
6/17/2025   Chief Complaint   Patient presents with    Post-Op Check     Right BEATA         History of Present Illness:                             Harsha Liu is a 54 y.o. male who returns today for follow-up of his right total hip replacement.  He is doing well and made good progress through physical therapy.  He is taking pain medication at night some and has been tapering off of this.  Overall he is doing well and pleased with his progress    Patient returns to the office with a 7 week post op of his right BEATA. Pt stated that he is doing well but is continuing to have pain at night. Pt he has continued to go to physical therapy with no concerns with mobility. He is taking pain medication mostly at night.        Medical History  Patient's medications, allergies, past medical, surgical, social and family histories were reviewed and updated as appropriate.      Review of Systems                                            Examination:  General Exam:  Vitals: Pulse 73   Ht 1.829 m (6')   SpO2 99%   BMI 36.18 kg/m²    Physical Exam     Right lower extremity:  Well-healed surgical scar overlying the anterior lateral right hip.  Normal alignment of the leg.  Patient is ambulating well today without need of assistance.  Sensation motor functions intact throughout the lower extremity.  Strength is intact with hip flexion, extension, and abduction    Diagnostic testing:  X-rays reviewed in office, I independently reviewed the films in the office today:   X-rays show well aligned right total hip replacement with no complications      Office Procedures:  No orders of the defined types were placed in this encounter.      Assessment and Plan  1.  Status post right total hip replacement    Patient is doing well through his recovery and rehabilitation.  He is returned to work and is getting around well.    I have encouraged him to continue his home exercise program and advancement of his activities as pain

## 2025-06-17 NOTE — PROGRESS NOTES
Patient returns to the office with a 7 week post op of his right BEATA. Pt stated that he is doing well but is continuing to have pain at night. Pt he has continued to go to physical therapy with no concerns with mobility. He is taking pain medication mostly at night.

## 2025-06-27 DIAGNOSIS — Z96.641 S/P TOTAL RIGHT HIP ARTHROPLASTY: Primary | ICD-10-CM

## 2025-06-27 RX ORDER — HYDROCODONE BITARTRATE AND ACETAMINOPHEN 5; 325 MG/1; MG/1
1 TABLET ORAL 2 TIMES DAILY PRN
Qty: 14 TABLET | Refills: 0 | Status: SHIPPED | OUTPATIENT
Start: 2025-06-27 | End: 2025-07-04

## 2025-06-30 RX ORDER — PHENTERMINE HYDROCHLORIDE 37.5 MG/1
37.5 TABLET ORAL
Qty: 30 TABLET | Refills: 0 | Status: SHIPPED | OUTPATIENT
Start: 2025-06-30 | End: 2025-07-30

## 2025-07-08 ENCOUNTER — OFFICE VISIT (OUTPATIENT)
Dept: FAMILY MEDICINE CLINIC | Age: 55
End: 2025-07-08
Payer: COMMERCIAL

## 2025-07-08 VITALS
RESPIRATION RATE: 18 BRPM | DIASTOLIC BLOOD PRESSURE: 70 MMHG | HEIGHT: 72 IN | OXYGEN SATURATION: 97 % | WEIGHT: 256.4 LBS | SYSTOLIC BLOOD PRESSURE: 120 MMHG | BODY MASS INDEX: 34.73 KG/M2 | HEART RATE: 84 BPM

## 2025-07-08 DIAGNOSIS — E78.2 MIXED HYPERLIPIDEMIA: ICD-10-CM

## 2025-07-08 DIAGNOSIS — M16.11 PRIMARY OSTEOARTHRITIS OF RIGHT HIP: Primary | ICD-10-CM

## 2025-07-08 DIAGNOSIS — R53.82 CHRONIC FATIGUE: ICD-10-CM

## 2025-07-08 DIAGNOSIS — F10.11 HISTORY OF ETOH ABUSE: ICD-10-CM

## 2025-07-08 PROBLEM — D48.5 NEOPLASM OF UNCERTAIN BEHAVIOR OF SKIN OF BACK: Status: ACTIVE | Noted: 2025-07-08

## 2025-07-08 PROCEDURE — 99213 OFFICE O/P EST LOW 20 MIN: CPT | Performed by: INTERNAL MEDICINE

## 2025-07-08 RX ORDER — DISULFIRAM 250 MG/1
250 TABLET ORAL NIGHTLY
Qty: 90 TABLET | Refills: 0 | Status: SHIPPED | OUTPATIENT
Start: 2025-07-08

## 2025-07-08 RX ORDER — PHENTERMINE HYDROCHLORIDE 37.5 MG/1
37.5 TABLET ORAL
Qty: 30 TABLET | Refills: 0 | Status: SHIPPED | OUTPATIENT
Start: 2025-07-08 | End: 2025-08-07

## 2025-07-08 NOTE — PROGRESS NOTES
Outpatient Clinic Visit Note    Patient: Harsha Liu  : 1970 (54 y.o.)  Date: 2025    CC:  Chief Complaint   Patient presents with    Medication Check        HPI: feeling generally OK but his hip is still pretty sore, was repaired about 11 weeks ago.     Past Medical History:    Past Medical History:   Diagnosis Date    Anxiety     Arthritis     GERD (gastroesophageal reflux disease)     Hernia, inguinal, left     Liver disease 3/16/23       Past Surgical History:  Past Surgical History:   Procedure Laterality Date    COLONOSCOPY N/A 2024    COLONOSCOPY POLYPECTOMY ABLATION WITH EPI INJECTION HEMACLIP X 8 performed by Polo Marks MD at [facility] ENDOSCOPY    CYST REMOVAL Left     LEFT ARM    HERNIA REPAIR      left inguinal hernia- Dr. Vanegas     TOTAL HIP ARTHROPLASTY Right 2025    HIP TOTAL ARTHROPLASTY ANTERIOR APPROACH performed by Lance Marcelo MD at [facility] OR    UPPER GASTROINTESTINAL ENDOSCOPY N/A 2024    ESOPHAGOGASTRODUODENOSCOPY BIOPSY performed by Polo Marks MD at [facility] ENDOSCOPY       Home Medications:  Current Outpatient Medications   Medication Sig Dispense Refill    disulfiram (ANTABUSE) 250 MG tablet Take 1 tablet by mouth at bedtime as directed 90 tablet 0    phentermine (ADIPEX-P) 37.5 MG tablet Take 1 tablet by mouth every morning (before breakfast) for 30 days. Max Daily Amount: 37.5 mg 30 tablet 0    omeprazole (PRILOSEC) 40 MG delayed release capsule Take 1 capsule by mouth daily 90 capsule 0    nadolol (CORGARD) 20 MG tablet Take 1 tablet by mouth daily 90 tablet 1    thiamine 100 MG tablet Take 1 tablet by mouth daily 90 tablet 1    vitamin B-1 (THIAMINE) 100 MG tablet Take 1 tablet by mouth daily      Multiple Vitamins-Minerals (THERAPEUTIC MULTIVITAMIN-MINERALS) tablet Take 1 tablet by mouth daily      folic acid (FOLVITE) 400 MCG tablet Take 1 tablet by mouth daily Takes OTC      HYDROcodone-acetaminophen (NORCO) 5-325 MG per tablet Take 1 tablet by mouth  Breath sounds clear and equal bilaterally.

## 2025-07-22 ENCOUNTER — TELEPHONE (OUTPATIENT)
Dept: GASTROENTEROLOGY | Age: 55
End: 2025-07-22

## 2025-07-24 ENCOUNTER — OFFICE VISIT (OUTPATIENT)
Dept: ORTHOPEDIC SURGERY | Age: 55
End: 2025-07-24

## 2025-07-24 VITALS
WEIGHT: 260 LBS | HEART RATE: 73 BPM | RESPIRATION RATE: 17 BRPM | BODY MASS INDEX: 35.21 KG/M2 | HEIGHT: 72 IN | OXYGEN SATURATION: 96 %

## 2025-07-24 DIAGNOSIS — Z96.641 S/P TOTAL RIGHT HIP ARTHROPLASTY: Primary | ICD-10-CM

## 2025-07-24 RX ORDER — OMEPRAZOLE 40 MG/1
CAPSULE, DELAYED RELEASE ORAL DAILY
Qty: 90 CAPSULE | Refills: 0 | Status: SHIPPED | OUTPATIENT
Start: 2025-07-24

## 2025-07-24 NOTE — PATIENT INSTRUCTIONS
Continue weight-bearing as tolerated.  Continue range of motion exercises as instructed.  Ice and elevate as needed.  Tylenol or Motrin for pain.  Follow up in 6-8 weeks if needed.    We are committed to providing you the best care possible.  If you receive a survey after visiting one of our offices, please take time to share your experience concerning your physician office visit.  These surveys are confidential and no health information about you is shared.  We are eager to improve for you and we are counting on your feedback to help make that happen.

## 2025-07-24 NOTE — PROGRESS NOTES
Patient seen in office today for RT BEATA   DOS: 04/28/2025  Patient reports 5/10 pain.  RICE and medication are not effective to alleviate pain and reduce swelling.   Pain worsened by: Patient reports painful ROM & weight bearing.     Patient states that within last month pain has increased.

## 2025-07-28 ENCOUNTER — OFFICE VISIT (OUTPATIENT)
Dept: CARDIOLOGY CLINIC | Age: 55
End: 2025-07-28
Payer: COMMERCIAL

## 2025-07-28 VITALS
BODY MASS INDEX: 34.81 KG/M2 | HEART RATE: 84 BPM | SYSTOLIC BLOOD PRESSURE: 122 MMHG | HEIGHT: 72 IN | WEIGHT: 257 LBS | DIASTOLIC BLOOD PRESSURE: 72 MMHG

## 2025-07-28 DIAGNOSIS — Z01.818 PREOPERATIVE TESTING: Primary | ICD-10-CM

## 2025-07-28 DIAGNOSIS — I36.1 NONRHEUMATIC TRICUSPID VALVE REGURGITATION: ICD-10-CM

## 2025-07-28 PROCEDURE — 99214 OFFICE O/P EST MOD 30 MIN: CPT | Performed by: INTERNAL MEDICINE

## 2025-07-28 NOTE — PROGRESS NOTES
Whitney Mclean MD        OFFICE  FOLLOWUP NOTE    Chief complaints: patient is here for management of tricuspid regurgitation, POST OP, OBEISTY, ALCOHOLISM  Subjective: patient feels better, no chest pain, no shortness of breath, no dizziness, no palpitations    HPI Harsha is a 55 y.o.year old who  has a past medical history of Anxiety, Arthritis, GERD (gastroesophageal reflux disease), Hernia, inguinal, left, and Liver disease. and presents for management oF tricuspid regurgitation, POST OP, OBEISTY, ALCOHOLISMIB, which are well controlled      Current Outpatient Medications   Medication Sig Dispense Refill    omeprazole (PRILOSEC) 40 MG delayed release capsule TAKE 1 CAPSULE BY MOUTH EVERY DAY 90 capsule 0    disulfiram (ANTABUSE) 250 MG tablet Take 1 tablet by mouth at bedtime as directed 90 tablet 0    phentermine (ADIPEX-P) 37.5 MG tablet Take 1 tablet by mouth every morning (before breakfast) for 30 days. Max Daily Amount: 37.5 mg 30 tablet 0    nadolol (CORGARD) 20 MG tablet Take 1 tablet by mouth daily 90 tablet 1    vitamin B-1 (THIAMINE) 100 MG tablet Take 1 tablet by mouth daily      Multiple Vitamins-Minerals (THERAPEUTIC MULTIVITAMIN-MINERALS) tablet Take 1 tablet by mouth daily      folic acid (FOLVITE) 400 MCG tablet Take 1 tablet by mouth daily Takes OTC      HYDROcodone-acetaminophen (NORCO) 5-325 MG per tablet Take 1 tablet by mouth every 4 hours as needed for Pain for up to 5 days. Intended supply 5 days. Take lowest dose possible to manage pain. (Patient not taking: Reported on 7/8/2025)      omeprazole (PRILOSEC) 40 MG delayed release capsule Take 1 capsule by mouth daily (Patient not taking: Reported on 7/28/2025) 90 capsule 0    thiamine 100 MG tablet Take 1 tablet by mouth daily (Patient not taking: Reported on 7/28/2025) 90 tablet 1     No current facility-administered medications for this visit.     Allergies: Patient has no known allergies.  Past Medical History:

## 2025-07-28 NOTE — PROGRESS NOTES
CLINICAL STAFF DOCUMENTATION    Dr. Whitney Liu  1970  [id number]    Have you had any Chest Pain recently? - No          Have you had any Shortness of Breath - No  Have you had any dizziness - No    Have you had any palpitations recently? - No  Any thyroid issues? - No    Do you have any edema - swelling in LT LEG  SEEING SOMEONE FOR THIS    Is the patient on any of the following medications -   If Yes DO EKG - Needs done every 3 months    When did you have your last labs drawn 2025  What doctor ordered   Do we have the labs in their chart Yes  If we do not have the labs, ask where they were drawn     If we do not have these labs, you are retrieve these labs for the provider!    Do you need any prescriptions refilled? - No    Do you have a surgery or procedure scheduled in the near future - Yes  If Yes- DO EKG  If Yes - Who is the surgery with? [facility] NOT SURE WHEN   Phone number of physician   Fax number of physician   Type of surgery COLONOSCOPY  GIVE THIS INFORMATION TO GLORIA MANDEL AND RYAN HERNANDEZ    Do use tobacco products? - No  Do you drink alcohol? - No  Do you use any illicit drugs? - No  Caffeine? - Yes  How much caffeine? .1 COFFEE 1 POP        Check medication list thoroughly!!! AND RECONCILE OUTSIDE MEDICATIONS  If dose has changed change the entire order not just the MG  BE SURE TO ASK PATIENT IF THEY NEED MEDICATION REFILLS  Verify Pharmacy and update if incorrect    Add to every patient's \"wrap up\" the following dot phrase AFTERVISITCARDIOHEARTHOUSE and ensure we explain this to our patients

## 2025-07-29 RX ORDER — PHENTERMINE HYDROCHLORIDE 37.5 MG/1
37.5 TABLET ORAL
Qty: 30 TABLET | Refills: 0 | Status: SHIPPED | OUTPATIENT
Start: 2025-07-29 | End: 2025-08-28

## 2025-08-02 NOTE — PROGRESS NOTES
7/24/2025   Chief Complaint   Patient presents with    Hip Pain     RT BEATA         History of Present Illness:         Today's HPI:  Harsha Liu is a 55 y.o. male returning to the office today for continued postoperative care regarding his right total hip arthroplasty.  Patient states he is doing well with his healing process.  He has noted some increases of pain as he has increased his activity levels.  He denies any complications or signs of infection from the incision area.  He is overall happy with his progress.    Patient seen in office today for RT BEATA   DOS: 04/28/2025  Patient reports 5/10 pain.  RICE and medication are not effective to alleviate pain and reduce swelling.   Pain worsened by: Patient reports painful ROM & weight bearing.      Patient states that within last month pain has increased.     Previous HPI:                      Harsha Liu is a 55 y.o. male who returns today for follow-up of his right total hip replacement.  He is doing well and made good progress through physical therapy.  He is taking pain medication at night some and has been tapering off of this.  Overall he is doing well and pleased with his progress    Patient returns to the office with a 7 week post op of his right BEATA. Pt stated that he is doing well but is continuing to have pain at night. Pt he has continued to go to physical therapy with no concerns with mobility. He is taking pain medication mostly at night.        Medical History  Patient's medications, allergies, past medical, surgical, social and family histories were reviewed and updated as appropriate.      Review of Systems                                            Examination:  General Exam:  Vitals: Pulse 73   Resp 17   Ht 1.829 m (6')   Wt 117.9 kg (260 lb)   SpO2 96%   BMI 35.26 kg/m²    Physical Exam     Right lower extremity:  Well-healed surgical scar overlying the anterior lateral right hip.  Normal alignment of the leg.  Patient is

## 2025-08-06 ENCOUNTER — TELEPHONE (OUTPATIENT)
Dept: GASTROENTEROLOGY | Age: 55
End: 2025-08-06

## 2025-08-13 RX ORDER — NADOLOL 20 MG/1
20 TABLET ORAL DAILY
Qty: 90 TABLET | Refills: 1 | Status: SHIPPED | OUTPATIENT
Start: 2025-08-13

## 2025-08-18 ENCOUNTER — OFFICE VISIT (OUTPATIENT)
Dept: FAMILY MEDICINE CLINIC | Age: 55
End: 2025-08-18
Payer: COMMERCIAL

## 2025-08-18 VITALS
DIASTOLIC BLOOD PRESSURE: 77 MMHG | BODY MASS INDEX: 34.35 KG/M2 | OXYGEN SATURATION: 98 % | HEART RATE: 84 BPM | HEIGHT: 72 IN | WEIGHT: 253.6 LBS | RESPIRATION RATE: 18 BRPM | SYSTOLIC BLOOD PRESSURE: 126 MMHG

## 2025-08-18 DIAGNOSIS — G89.29 CHRONIC BILATERAL LOW BACK PAIN WITHOUT SCIATICA: ICD-10-CM

## 2025-08-18 DIAGNOSIS — M54.50 CHRONIC BILATERAL LOW BACK PAIN WITHOUT SCIATICA: ICD-10-CM

## 2025-08-18 PROCEDURE — 99213 OFFICE O/P EST LOW 20 MIN: CPT | Performed by: INTERNAL MEDICINE

## 2025-08-18 RX ORDER — TRAMADOL HYDROCHLORIDE 50 MG/1
50 TABLET ORAL EVERY 6 HOURS PRN
Qty: 28 TABLET | Refills: 0 | Status: SHIPPED | OUTPATIENT
Start: 2025-08-18 | End: 2025-08-25

## 2025-08-18 RX ORDER — PHENTERMINE HYDROCHLORIDE 37.5 MG/1
37.5 TABLET ORAL
Qty: 30 TABLET | Refills: 0 | Status: SHIPPED | OUTPATIENT
Start: 2025-08-18 | End: 2025-09-17

## 2025-08-28 ENCOUNTER — TELEPHONE (OUTPATIENT)
Dept: GASTROENTEROLOGY | Age: 55
End: 2025-08-28

## 2025-09-05 ENCOUNTER — OFFICE VISIT (OUTPATIENT)
Dept: FAMILY MEDICINE CLINIC | Age: 55
End: 2025-09-05
Payer: COMMERCIAL

## 2025-09-05 VITALS — HEART RATE: 65 BPM | OXYGEN SATURATION: 98 % | TEMPERATURE: 97.7 F | BODY MASS INDEX: 34.23 KG/M2 | WEIGHT: 252.4 LBS

## 2025-09-05 DIAGNOSIS — L23.7 POISON IVY: Primary | ICD-10-CM

## 2025-09-05 PROCEDURE — 96372 THER/PROPH/DIAG INJ SC/IM: CPT | Performed by: NURSE PRACTITIONER

## 2025-09-05 PROCEDURE — 99213 OFFICE O/P EST LOW 20 MIN: CPT | Performed by: NURSE PRACTITIONER

## 2025-09-05 RX ORDER — PREDNISONE 10 MG/1
TABLET ORAL
Qty: 27 TABLET | Refills: 0 | Status: SHIPPED | OUTPATIENT
Start: 2025-09-05 | End: 2025-09-14

## 2025-09-05 RX ORDER — METHYLPREDNISOLONE ACETATE 40 MG/ML
40 INJECTION, SUSPENSION INTRA-ARTICULAR; INTRALESIONAL; INTRAMUSCULAR; SOFT TISSUE ONCE
Status: COMPLETED | OUTPATIENT
Start: 2025-09-05 | End: 2025-09-05

## 2025-09-05 RX ADMIN — METHYLPREDNISOLONE ACETATE 40 MG: 40 INJECTION, SUSPENSION INTRA-ARTICULAR; INTRALESIONAL; INTRAMUSCULAR; SOFT TISSUE at 13:14

## 2025-09-05 ASSESSMENT — ENCOUNTER SYMPTOMS
COUGH: 0
SINUS PRESSURE: 0
DIARRHEA: 0
SINUS PAIN: 0
VOMITING: 0
SORE THROAT: 0
CHEST TIGHTNESS: 0
RHINORRHEA: 0
WHEEZING: 0
SHORTNESS OF BREATH: 0
NAUSEA: 0

## (undated) DEVICE — PENCIL SMK EVAC 15FT BLADE ELECTRD ROCKER F/TELSCP

## (undated) DEVICE — SUTURE ABSORBABLE MONOFILAMENT 1 CTX 36 CM 48 MM VIO PDS +

## (undated) DEVICE — RETRIEVER STONE REM W4XL5.5CM SHTH DIA2.5MM UNIV SPR LIKE

## (undated) DEVICE — SNARE VASC L240CM LOOP W10MM SHTH DIA2.4MM RND STIFF CLD

## (undated) DEVICE — HOOD, PEEL-AWAY: Brand: FLYTE

## (undated) DEVICE — FORCEPS BX L240CM JAW DIA2.8MM L CAP W/ NDL MIC MESH TOOTH

## (undated) DEVICE — GLOVE ORANGE PI 8   MSG9080

## (undated) DEVICE — ACUSNARE POLYPECTOMY SNARE: Brand: ACUSNARE

## (undated) DEVICE — GLOVE ORANGE PI 7 1/2   MSG9075

## (undated) DEVICE — NEEDLE SCLERO 23GA L240CM OD064MM ID032MM CLR INTERJECT

## (undated) DEVICE — TOWEL,OR,DSP,ST,BLUE,STD,6/PK,12PK/CS: Brand: MEDLINE

## (undated) DEVICE — SUTURE MONOCRYL ABSORBABLE 3-0 PS-1 18 IN UD MONOCRYL + STRATAFIX SXMP1B102

## (undated) DEVICE — Device: Brand: COVER, PERINEAL POST, 12 PK

## (undated) DEVICE — SUTURE VICRYL SZ 2-0 L18IN ABSRB UD CT-1 L36MM 1/2 CIR J839D

## (undated) DEVICE — GOWN,SIRUS,POLYRNF,BRTHSLV,XLN/XL,20/CS: Brand: MEDLINE

## (undated) DEVICE — ENDOSCOPY KIT: Brand: MEDLINE INDUSTRIES, INC.

## (undated) DEVICE — 3M™ STERI-DRAPE™ U-DRAPE 1015: Brand: STERI-DRAPE™

## (undated) DEVICE — Device

## (undated) DEVICE — HOOD WITH PEEL AWAY FACE SHIELD: Brand: T7PLUS

## (undated) DEVICE — HOOD: Brand: T7PLUS

## (undated) DEVICE — COVER,C-ARM,41X74: Brand: MEDLINE

## (undated) DEVICE — STRYKER PERFORMANCE SERIES SAGITTAL BLADE: Brand: STRYKER PERFORMANCE SERIES

## (undated) DEVICE — SYSTEM SKIN CLSR 22CM DERMBND PRINEO

## (undated) DEVICE — YANKAUER,FLEXIBLE HANDLE,REGLR CAPACITY: Brand: MEDLINE INDUSTRIES, INC.

## (undated) DEVICE — SOLUTION IV 1000ML 0.9% SOD CHL FOR IRRIG PLAS CONT

## (undated) DEVICE — SUTURE FIBERWIRE 2 L97CM NONABSORBABLE BLU L36.6MM 1/2 CIR AR7206

## (undated) DEVICE — FAN SPRAY KIT: Brand: PULSAVAC®

## (undated) DEVICE — GLOVE SURG SZ 65 THK91MIL LTX FREE SYN POLYISOPRENE

## (undated) DEVICE — GLOVE ORANGE PI 7   MSG9070

## (undated) DEVICE — 6619 2 PTNT ISO SYS INCISE AREA&LT;(&GT;&&LT;)&GT;P: Brand: STERI-DRAPE™ IOBAN™ 2

## (undated) DEVICE — COVER,TABLE,44X90,STERILE: Brand: MEDLINE